# Patient Record
Sex: MALE | Race: WHITE | NOT HISPANIC OR LATINO | Employment: OTHER | URBAN - METROPOLITAN AREA
[De-identification: names, ages, dates, MRNs, and addresses within clinical notes are randomized per-mention and may not be internally consistent; named-entity substitution may affect disease eponyms.]

---

## 2018-01-04 ENCOUNTER — HOSPITAL ENCOUNTER (INPATIENT)
Facility: HOSPITAL | Age: 63
LOS: 2 days | Discharge: HOME | DRG: 064 | End: 2018-01-06
Attending: EMERGENCY MEDICINE | Admitting: PSYCHIATRY & NEUROLOGY

## 2018-01-04 DIAGNOSIS — R47.1 DYSARTHRIA: ICD-10-CM

## 2018-01-04 DIAGNOSIS — E11.65 POORLY CONTROLLED TYPE 2 DIABETES MELLITUS: ICD-10-CM

## 2018-01-04 DIAGNOSIS — I63.412 EMBOLIC STROKE INVOLVING LEFT MIDDLE CEREBRAL ARTERY: Primary | ICD-10-CM

## 2018-01-04 DIAGNOSIS — Z92.82 TISSUE PLASMINOGEN ACTIVATOR (T-PA) ADMINISTERED AT OTHER FACILITY WITHIN 24 HOURS PRIOR TO CURRENT ADMISSION: ICD-10-CM

## 2018-01-04 DIAGNOSIS — I63.512 ACUTE ISCHEMIC LEFT MCA STROKE: ICD-10-CM

## 2018-01-04 DIAGNOSIS — R47.01 APHASIA: ICD-10-CM

## 2018-01-04 PROBLEM — I63.331: Status: ACTIVE | Noted: 2018-01-04

## 2018-01-04 PROBLEM — I63.311 STROKE DUE TO THROMBOSIS OF RIGHT MIDDLE CEREBRAL ARTERY: Status: ACTIVE | Noted: 2018-01-04

## 2018-01-04 LAB
ABO + RH BLD: NORMAL
BLD GP AB SCN CELLS X3 SERPL QL: NORMAL
CHOLEST SERPL-MCNC: 373 MG/DL
CHOLEST/HDLC SERPL: 10.4 {RATIO}
ESTIMATED AVG GLUCOSE: 203 MG/DL
GLUCOSE SERPL-MCNC: 208 MG/DL (ref 70–110)
HBA1C MFR BLD HPLC: 8.7 %
HDLC SERPL-MCNC: 36 MG/DL
HDLC SERPL: 9.7 %
LDLC SERPL CALC-MCNC: ABNORMAL MG/DL
NONHDLC SERPL-MCNC: 337 MG/DL
POCT GLUCOSE: 208 MG/DL (ref 70–110)
POCT GLUCOSE: 239 MG/DL (ref 70–110)
TRIGL SERPL-MCNC: 1305 MG/DL
TSH SERPL DL<=0.005 MIU/L-ACNC: 2.13 UIU/ML

## 2018-01-04 PROCEDURE — 99285 EMERGENCY DEPT VISIT HI MDM: CPT | Mod: ,,, | Performed by: EMERGENCY MEDICINE

## 2018-01-04 PROCEDURE — 93005 ELECTROCARDIOGRAM TRACING: CPT

## 2018-01-04 PROCEDURE — 82962 GLUCOSE BLOOD TEST: CPT

## 2018-01-04 PROCEDURE — A4216 STERILE WATER/SALINE, 10 ML: HCPCS | Performed by: PHYSICIAN ASSISTANT

## 2018-01-04 PROCEDURE — 93010 ELECTROCARDIOGRAM REPORT: CPT | Mod: ,,, | Performed by: INTERNAL MEDICINE

## 2018-01-04 PROCEDURE — 99285 EMERGENCY DEPT VISIT HI MDM: CPT | Mod: 25

## 2018-01-04 PROCEDURE — 96361 HYDRATE IV INFUSION ADD-ON: CPT | Mod: XS

## 2018-01-04 PROCEDURE — 96374 THER/PROPH/DIAG INJ IV PUSH: CPT

## 2018-01-04 PROCEDURE — 99223 1ST HOSP IP/OBS HIGH 75: CPT | Mod: ,,, | Performed by: PHYSICIAN ASSISTANT

## 2018-01-04 PROCEDURE — 12000002 HC ACUTE/MED SURGE SEMI-PRIVATE ROOM

## 2018-01-04 PROCEDURE — 25000003 PHARM REV CODE 250: Performed by: PHYSICIAN ASSISTANT

## 2018-01-04 PROCEDURE — 83036 HEMOGLOBIN GLYCOSYLATED A1C: CPT

## 2018-01-04 PROCEDURE — 84443 ASSAY THYROID STIM HORMONE: CPT

## 2018-01-04 PROCEDURE — 96375 TX/PRO/DX INJ NEW DRUG ADDON: CPT

## 2018-01-04 PROCEDURE — 80061 LIPID PANEL: CPT

## 2018-01-04 PROCEDURE — 86850 RBC ANTIBODY SCREEN: CPT

## 2018-01-04 PROCEDURE — 25500020 PHARM REV CODE 255: Performed by: EMERGENCY MEDICINE

## 2018-01-04 PROCEDURE — 96360 HYDRATION IV INFUSION INIT: CPT | Mod: XS

## 2018-01-04 PROCEDURE — 25000003 PHARM REV CODE 250: Performed by: EMERGENCY MEDICINE

## 2018-01-04 RX ORDER — ATORVASTATIN CALCIUM 20 MG/1
40 TABLET, FILM COATED ORAL DAILY
Status: DISCONTINUED | OUTPATIENT
Start: 2018-01-05 | End: 2018-01-06

## 2018-01-04 RX ORDER — IBUPROFEN 200 MG
24 TABLET ORAL
Status: DISCONTINUED | OUTPATIENT
Start: 2018-01-05 | End: 2018-01-06 | Stop reason: HOSPADM

## 2018-01-04 RX ORDER — GLUCAGON 1 MG
1 KIT INJECTION
Status: DISCONTINUED | OUTPATIENT
Start: 2018-01-05 | End: 2018-01-06 | Stop reason: HOSPADM

## 2018-01-04 RX ORDER — AMOXICILLIN 250 MG
1 CAPSULE ORAL 2 TIMES DAILY
Status: DISCONTINUED | OUTPATIENT
Start: 2018-01-04 | End: 2018-01-06 | Stop reason: HOSPADM

## 2018-01-04 RX ORDER — LABETALOL HYDROCHLORIDE 5 MG/ML
10 INJECTION, SOLUTION INTRAVENOUS ONCE
Status: COMPLETED | OUTPATIENT
Start: 2018-01-04 | End: 2018-01-04

## 2018-01-04 RX ORDER — ACETAMINOPHEN 325 MG/1
650 TABLET ORAL EVERY 6 HOURS PRN
Status: DISCONTINUED | OUTPATIENT
Start: 2018-01-04 | End: 2018-01-06 | Stop reason: HOSPADM

## 2018-01-04 RX ORDER — INSULIN ASPART 100 [IU]/ML
1-10 INJECTION, SOLUTION INTRAVENOUS; SUBCUTANEOUS
Status: DISCONTINUED | OUTPATIENT
Start: 2018-01-05 | End: 2018-01-06

## 2018-01-04 RX ORDER — ONDANSETRON 2 MG/ML
8 INJECTION INTRAMUSCULAR; INTRAVENOUS EVERY 8 HOURS PRN
Status: DISCONTINUED | OUTPATIENT
Start: 2018-01-04 | End: 2018-01-06 | Stop reason: HOSPADM

## 2018-01-04 RX ORDER — SODIUM CHLORIDE 9 MG/ML
INJECTION, SOLUTION INTRAVENOUS CONTINUOUS
Status: DISCONTINUED | OUTPATIENT
Start: 2018-01-04 | End: 2018-01-05

## 2018-01-04 RX ORDER — IBUPROFEN 200 MG
16 TABLET ORAL
Status: DISCONTINUED | OUTPATIENT
Start: 2018-01-05 | End: 2018-01-06 | Stop reason: HOSPADM

## 2018-01-04 RX ORDER — SODIUM CHLORIDE 0.9 % (FLUSH) 0.9 %
3 SYRINGE (ML) INJECTION EVERY 8 HOURS
Status: DISCONTINUED | OUTPATIENT
Start: 2018-01-04 | End: 2018-01-06 | Stop reason: HOSPADM

## 2018-01-04 RX ADMIN — SODIUM CHLORIDE: 0.9 INJECTION, SOLUTION INTRAVENOUS at 10:01

## 2018-01-04 RX ADMIN — IOHEXOL 100 ML: 350 INJECTION, SOLUTION INTRAVENOUS at 07:01

## 2018-01-04 RX ADMIN — STANDARDIZED SENNA CONCENTRATE AND DOCUSATE SODIUM 1 TABLET: 8.6; 5 TABLET, FILM COATED ORAL at 10:01

## 2018-01-04 RX ADMIN — SODIUM CHLORIDE, PRESERVATIVE FREE 3 ML: 5 INJECTION INTRAVENOUS at 10:01

## 2018-01-04 RX ADMIN — LABETALOL HYDROCHLORIDE 10 MG: 5 INJECTION, SOLUTION INTRAVENOUS at 07:01

## 2018-01-05 PROBLEM — G93.6 CYTOTOXIC CEREBRAL EDEMA: Status: ACTIVE | Noted: 2018-01-05

## 2018-01-05 PROBLEM — I63.412 EMBOLIC STROKE INVOLVING LEFT MIDDLE CEREBRAL ARTERY: Status: ACTIVE | Noted: 2018-01-04

## 2018-01-05 PROBLEM — I65.22 CAROTID STENOSIS, LEFT: Status: ACTIVE | Noted: 2018-01-05

## 2018-01-05 PROBLEM — E78.2 MIXED HYPERLIPIDEMIA: Status: ACTIVE | Noted: 2018-01-05

## 2018-01-05 LAB
ALBUMIN SERPL BCP-MCNC: 3.2 G/DL
ALP SERPL-CCNC: 92 U/L
ALT SERPL W/O P-5'-P-CCNC: 25 U/L
ANION GAP SERPL CALC-SCNC: 13 MMOL/L
AST SERPL-CCNC: 18 U/L
BASOPHILS # BLD AUTO: 0.02 K/UL
BASOPHILS NFR BLD: 0.3 %
BILIRUB SERPL-MCNC: 0.3 MG/DL
BUN SERPL-MCNC: 9 MG/DL
CALCIUM SERPL-MCNC: 9.6 MG/DL
CHLORIDE SERPL-SCNC: 102 MMOL/L
CO2 SERPL-SCNC: 21 MMOL/L
CREAT SERPL-MCNC: 1.1 MG/DL
DIASTOLIC DYSFUNCTION: NO
DIFFERENTIAL METHOD: ABNORMAL
EOSINOPHIL # BLD AUTO: 0.1 K/UL
EOSINOPHIL NFR BLD: 1 %
ERYTHROCYTE [DISTWIDTH] IN BLOOD BY AUTOMATED COUNT: 12.3 %
EST. GFR  (AFRICAN AMERICAN): >60 ML/MIN/1.73 M^2
EST. GFR  (NON AFRICAN AMERICAN): >60 ML/MIN/1.73 M^2
GLUCOSE SERPL-MCNC: 335 MG/DL
HCT VFR BLD AUTO: 38.2 %
HGB BLD-MCNC: 13.5 G/DL
IMM GRANULOCYTES # BLD AUTO: 0.02 K/UL
IMM GRANULOCYTES NFR BLD AUTO: 0.3 %
LYMPHOCYTES # BLD AUTO: 2.2 K/UL
LYMPHOCYTES NFR BLD: 32.8 %
MAGNESIUM SERPL-MCNC: 2.3 MG/DL
MCH RBC QN AUTO: 32.9 PG
MCHC RBC AUTO-ENTMCNC: 35.3 G/DL
MCV RBC AUTO: 93 FL
MONOCYTES # BLD AUTO: 0.4 K/UL
MONOCYTES NFR BLD: 5.3 %
NEUTROPHILS # BLD AUTO: 4.1 K/UL
NEUTROPHILS NFR BLD: 60.3 %
NRBC BLD-RTO: 0 /100 WBC
PHOSPHATE SERPL-MCNC: 3.3 MG/DL
PLATELET # BLD AUTO: 190 K/UL
PMV BLD AUTO: 10 FL
POCT GLUCOSE: 140 MG/DL (ref 70–110)
POCT GLUCOSE: 199 MG/DL (ref 70–110)
POTASSIUM SERPL-SCNC: 4.6 MMOL/L
PROT SERPL-MCNC: 6.7 G/DL
RBC # BLD AUTO: 4.1 M/UL
RETIRED EF AND QEF - SEE NOTES: 55 (ref 55–65)
SODIUM SERPL-SCNC: 136 MMOL/L
WBC # BLD AUTO: 6.77 K/UL

## 2018-01-05 PROCEDURE — 93306 TTE W/DOPPLER COMPLETE: CPT

## 2018-01-05 PROCEDURE — G8998 SWALLOW D/C STATUS: HCPCS | Mod: CI

## 2018-01-05 PROCEDURE — 84100 ASSAY OF PHOSPHORUS: CPT

## 2018-01-05 PROCEDURE — 63600175 PHARM REV CODE 636 W HCPCS: Performed by: PHYSICIAN ASSISTANT

## 2018-01-05 PROCEDURE — 25000003 PHARM REV CODE 250: Performed by: PHYSICIAN ASSISTANT

## 2018-01-05 PROCEDURE — 25000003 PHARM REV CODE 250: Performed by: STUDENT IN AN ORGANIZED HEALTH CARE EDUCATION/TRAINING PROGRAM

## 2018-01-05 PROCEDURE — 85025 COMPLETE CBC W/AUTO DIFF WBC: CPT

## 2018-01-05 PROCEDURE — G8996 SWALLOW CURRENT STATUS: HCPCS | Mod: CI

## 2018-01-05 PROCEDURE — 83735 ASSAY OF MAGNESIUM: CPT

## 2018-01-05 PROCEDURE — A4216 STERILE WATER/SALINE, 10 ML: HCPCS | Performed by: PHYSICIAN ASSISTANT

## 2018-01-05 PROCEDURE — 80053 COMPREHEN METABOLIC PANEL: CPT

## 2018-01-05 PROCEDURE — 92610 EVALUATE SWALLOWING FUNCTION: CPT

## 2018-01-05 PROCEDURE — 93306 TTE W/DOPPLER COMPLETE: CPT | Mod: 26,,, | Performed by: INTERNAL MEDICINE

## 2018-01-05 PROCEDURE — 63600175 PHARM REV CODE 636 W HCPCS: Performed by: STUDENT IN AN ORGANIZED HEALTH CARE EDUCATION/TRAINING PROGRAM

## 2018-01-05 PROCEDURE — 20600001 HC STEP DOWN PRIVATE ROOM

## 2018-01-05 PROCEDURE — 12000002 HC ACUTE/MED SURGE SEMI-PRIVATE ROOM

## 2018-01-05 PROCEDURE — 99233 SBSQ HOSP IP/OBS HIGH 50: CPT | Mod: ,,, | Performed by: PHYSICIAN ASSISTANT

## 2018-01-05 PROCEDURE — G8997 SWALLOW GOAL STATUS: HCPCS | Mod: CI

## 2018-01-05 PROCEDURE — 96372 THER/PROPH/DIAG INJ SC/IM: CPT | Mod: XU

## 2018-01-05 PROCEDURE — 92523 SPEECH SOUND LANG COMPREHEN: CPT

## 2018-01-05 PROCEDURE — 99233 SBSQ HOSP IP/OBS HIGH 50: CPT | Mod: ,,, | Performed by: PSYCHIATRY & NEUROLOGY

## 2018-01-05 PROCEDURE — 82962 GLUCOSE BLOOD TEST: CPT

## 2018-01-05 PROCEDURE — 97162 PT EVAL MOD COMPLEX 30 MIN: CPT

## 2018-01-05 RX ORDER — ASPIRIN 325 MG
325 TABLET ORAL DAILY
Status: DISCONTINUED | OUTPATIENT
Start: 2018-01-05 | End: 2018-01-05

## 2018-01-05 RX ORDER — HEPARIN SODIUM 5000 [USP'U]/ML
5000 INJECTION, SOLUTION INTRAVENOUS; SUBCUTANEOUS EVERY 8 HOURS
Status: DISCONTINUED | OUTPATIENT
Start: 2018-01-05 | End: 2018-01-05

## 2018-01-05 RX ORDER — ASPIRIN 325 MG
325 TABLET ORAL DAILY
Status: DISCONTINUED | OUTPATIENT
Start: 2018-01-05 | End: 2018-01-06 | Stop reason: HOSPADM

## 2018-01-05 RX ORDER — HYDRALAZINE HYDROCHLORIDE 20 MG/ML
10 INJECTION INTRAMUSCULAR; INTRAVENOUS
Status: COMPLETED | OUTPATIENT
Start: 2018-01-05 | End: 2018-01-05

## 2018-01-05 RX ORDER — LISINOPRIL 10 MG/1
10 TABLET ORAL DAILY
Status: DISCONTINUED | OUTPATIENT
Start: 2018-01-05 | End: 2018-01-06 | Stop reason: HOSPADM

## 2018-01-05 RX ORDER — HEPARIN SODIUM 5000 [USP'U]/ML
5000 INJECTION, SOLUTION INTRAVENOUS; SUBCUTANEOUS EVERY 8 HOURS
Status: DISCONTINUED | OUTPATIENT
Start: 2018-01-05 | End: 2018-01-06 | Stop reason: HOSPADM

## 2018-01-05 RX ORDER — INSULIN ASPART 100 [IU]/ML
4 INJECTION, SOLUTION INTRAVENOUS; SUBCUTANEOUS
Status: DISCONTINUED | OUTPATIENT
Start: 2018-01-05 | End: 2018-01-06 | Stop reason: HOSPADM

## 2018-01-05 RX ADMIN — INSULIN DETEMIR 6 UNITS: 100 INJECTION, SOLUTION SUBCUTANEOUS at 09:01

## 2018-01-05 RX ADMIN — HEPARIN SODIUM 5000 UNITS: 5000 INJECTION, SOLUTION INTRAVENOUS; SUBCUTANEOUS at 09:01

## 2018-01-05 RX ADMIN — SODIUM CHLORIDE, PRESERVATIVE FREE 3 ML: 5 INJECTION INTRAVENOUS at 09:01

## 2018-01-05 RX ADMIN — SODIUM CHLORIDE, PRESERVATIVE FREE 3 ML: 5 INJECTION INTRAVENOUS at 01:01

## 2018-01-05 RX ADMIN — INSULIN ASPART 2 UNITS: 100 INJECTION, SOLUTION INTRAVENOUS; SUBCUTANEOUS at 01:01

## 2018-01-05 RX ADMIN — HYDRALAZINE HYDROCHLORIDE 10 MG: 20 INJECTION INTRAMUSCULAR; INTRAVENOUS at 08:01

## 2018-01-05 RX ADMIN — STANDARDIZED SENNA CONCENTRATE AND DOCUSATE SODIUM 1 TABLET: 8.6; 5 TABLET, FILM COATED ORAL at 09:01

## 2018-01-05 RX ADMIN — ASPIRIN 325 MG ORAL TABLET 325 MG: 325 PILL ORAL at 09:01

## 2018-01-05 RX ADMIN — INSULIN ASPART 4 UNITS: 100 INJECTION, SOLUTION INTRAVENOUS; SUBCUTANEOUS at 01:01

## 2018-01-05 RX ADMIN — ATORVASTATIN CALCIUM 40 MG: 20 TABLET, FILM COATED ORAL at 08:01

## 2018-01-05 RX ADMIN — STANDARDIZED SENNA CONCENTRATE AND DOCUSATE SODIUM 1 TABLET: 8.6; 5 TABLET, FILM COATED ORAL at 08:01

## 2018-01-05 RX ADMIN — INSULIN ASPART 4 UNITS: 100 INJECTION, SOLUTION INTRAVENOUS; SUBCUTANEOUS at 04:01

## 2018-01-05 RX ADMIN — INSULIN DETEMIR 6 UNITS: 100 INJECTION, SOLUTION SUBCUTANEOUS at 01:01

## 2018-01-05 RX ADMIN — SODIUM CHLORIDE, PRESERVATIVE FREE 3 ML: 5 INJECTION INTRAVENOUS at 06:01

## 2018-01-05 RX ADMIN — LISINOPRIL 10 MG: 10 TABLET ORAL at 01:01

## 2018-01-05 NOTE — PT/OT/SLP EVAL
"Physical Therapy Evaluation   Discharge Note    Patient Name:  Duane Raines   MRN:  [id number]    Recommendations:     Discharge Recommendations:  home   Discharge Equipment Recommendations: none   Barriers to discharge: None    Plan:     During this hospitalization, patient does not require further acute PT services.  Please re-consult if situation changes.     Plan of Care Reviewed with: patient      History:     History reviewed. No pertinent past medical history.  Pt fell through ceiling several years ago.  No residual deficits from fall.     History reviewed. No pertinent surgical history.      Subjective     Communicated with RN prior to session.  Patient found supine in bed on stretcher in the ED upon PT entry to room, agreeable to evaluation.      Chief Complaint: speech  Patient comments/goals: "I was talking to my son, when it became difficult for me to get my words out.  He didn't recognize what was wrong with me, but I knew I had to get to the hospital."  Pain/Comfort:  · Pain Rating 1: 0/10  · Pain Rating Post-Intervention 1: 0/10    Patients cultural, spiritual, Latter day conflicts given the current situation:      Living Environment:  Pt lives in [place], [place] in a trailer with 5 steps and bilateral rails to enter (R rail in the front, bilateral rails in the back).  He was independent prior to admit and his son recently moved in with him (from prison).  Pt is retired and not working, but still drives and remains active.  Pt is R handed and has walk in shower, tub/shower combo, and garden tub.  His son is available to assist as needed.     Objective:     Patient found with: telemetry, pulse ox (continuous), peripheral IV, blood pressure cuff     General Precautions: Standard, aspiration, fall     PHYSICAL EXAMINATION  Cognitive Function:  - Oriented to: person, place, time and situation  - Level of Alertness: awake and alert  - Follows Commands/attention: Follows multistep  commands  - Communication: " dysarthria  - Safety awareness/insight to disability: intact  Musculoskeletal System  Upper Extremity   ROM: WFL  Strength: WFL  Lower Extremities:  ROM: WFL  Strength:  Muscle Group R LE L LE Comments   Hip flexion  5/5 5/5       Knee flexion  5/5 5/5       Knee ext. 5/5 5/5    Ankle DF 5/5 5/5    Ankle PF 5/5 5/5    - Muscular Tone: normal    Integumentary System: Visible skin intact  Neuromuscular System:  - Sensation: peripheral neuropathy in bilateral feet in sock distribution, dysesthesias   - Coordination:    Finger to thumb opposition: intact, except on L index finger d/t ROM deficits   Finger to nose: intact   Posture and gross symmetry: no postural deviations noted in sitting or standing    BALANCE:  Sitting:  - Static: NORMAL: No deviations seen in posture held statically;   - Dynamic: NORMAL: No deviations seen in posture held dynamically;   Standing:  - Static Stand: NORMAL: No deviations seen in posture held statically;   - Dynamic stand: GOOD+: Independent gait (with or without assistive device);     FUNCTIONAL MOBILITY ASSESSMENT:  Bed Mobility: performed with HOB flat  - Rolling/Turning R: independent  - Rolling/Turning L: independent   - Supine <> sit: independent   - Scooting EOB: independent      Transfers:  - Sit <> stand transfer: independent    - Bed <> chair transfer: stand by assist/supervision for line management only     Gait: 20 feet with supervision for line management only  - Patient demonstrated reciprocal gait pattern with no significant gait deviations, LOB, or safety concerns.   - Gt limited d/t within 24hrs of receiving tPA    Tandem gait performed x 5 feet with minimal instability but no overt LOB  Standing in maximal position with no LOB even with perturbations.     THERAPEUTIC ACTIVITIES AND EXERCISES:  Therapist educated patient on the role of PT, POC, and therapy recommendations of no additional PT.  Therapist discussed the patients current mobility status and level of  assistance with patient.  Therapist answered questions to patient/familys satisfaction within scope of practice.  White board updated to reflect current level of assistance.     Pt is safe to perform transfers and gait with supervision for line management.       Patient left supine on stretcher with all lines intact, call button in reach and RN notified.       AM-PAC 6 CLICK MOBILITY  Total Score:24     GOALS:    Physical Therapy Goals     Not on file          Multidisciplinary Problems (Resolved)        Problem: Physical Therapy Goal    Goal Priority Disciplines Outcome Goal Variances Interventions   Physical Therapy Goal   (Resolved)     PT/OT, PT Outcome(s) achieved                   Assessment:     Duane Raines is a 62 y.o. male admitted with a medical diagnosis of L MCA stroke. He was completely independent prior to admit.  At this time, patient is functioning at their prior level of function and does not require further acute PT services. He is safe to d/c home when medically appropriate with no additional PT or DME needs.     Recent Surgery: * No surgery found *      Clinical Decision Making:   COMPLEXITY OF PT EXAMINATION:  HISTORY     Comorbidities that affect the PT plan of care or the patient's ability to participate in/progress with therapy:  1. Peripheral neuropathy        Personal Factors:   1. Time since onset of injury / illness / exacerbation.    EXAMINATION  Body Systems:  1. Communication ability, affect, cognition, language, and learning style: the assessment of the ability to make needs known, consciousness, orientation, expected emotional /behavioral responses, and learning preferences  2. Neuromuscular system: a general assessment of gross coordinated movement (eg, balance, gait, locomotion, transfers, and transitions) and motor function (motor control and motor learning)  3. Musculoskeletal system: the assessment of gross symmetry, gross range of motion, gross strength, height, and  weight  4. Integumentary system: the assessment of pliability(texture), presence of scar formation, skin color, and skin integrity      Activity or participation limitations:   Status of current condition    CLINICAL PRESENTATION: Evolving/changing characteristics  Pt was evaluated in the ED within 24 hrs of receiving tPA and vitals monitored continuously throughout.      LEVEL OF COMPLEXITY: Moderate Complexity - at least 1-2 personal factors or comorbidities that impact the plan of care; examination addressing at least 3 body structures and functions, activity limitations, and/or participation restrictions; and clinical presentation with evolving or changing characteristics.    Time Tracking:     PT Received On: 01/05/18  PT Start Time: 1026     PT Stop Time: 1050  PT Total Time (min): 24 min     Billable Minutes: Evaluation 24      Veronica Perez, PT  01/05/2018

## 2018-01-05 NOTE — SUBJECTIVE & OBJECTIVE
History reviewed. No pertinent past medical history.  History reviewed. No pertinent surgical history.  Family History   Problem Relation Age of Onset    Diabetes Mother      Social History   Substance Use Topics    Smoking status: Former Smoker    Smokeless tobacco: Never Used    Alcohol use No     Review of patient's allergies indicates:  No Known Allergies    Medications: I have reviewed the current medication administration record.      (Not in a hospital admission)    Review of Systems   Constitutional: Negative for chills and fever.   HENT: Negative for drooling and rhinorrhea.    Eyes: Positive for visual disturbance. Negative for redness.   Respiratory: Negative for cough and shortness of breath.    Cardiovascular: Negative for chest pain and palpitations.   Gastrointestinal: Negative for diarrhea and vomiting.   Genitourinary: Negative for frequency and urgency.   Musculoskeletal: Negative for arthralgias and gait problem.   Skin: Negative for pallor and rash.   Neurological: Negative for facial asymmetry, weakness and headaches.   Psychiatric/Behavioral: Negative for agitation and behavioral problems.     Objective:     Vital Signs (Most Recent):  Temp: 97.9 °F (36.6 °C) (01/04/18 1908)  Pulse: 72 (01/04/18 2016)  Resp: 18 (01/04/18 2016)  BP: (!) 158/99 (01/04/18 2016)  SpO2: 98 % (01/04/18 2016)    Vital Signs Range (Last 24H):  Temp:  [97.4 °F (36.3 °C)-97.9 °F (36.6 °C)]   Pulse:  [67-84]   Resp:  [9-22]   BP: (144-195)/()   SpO2:  [97 %-99 %]     Physical Exam   Constitutional: He appears well-developed and well-nourished.   HENT:   Head: Normocephalic and atraumatic.   Eyes: EOM are normal. Pupils are equal, round, and reactive to light.   Neck: Normal range of motion. Neck supple.   Cardiovascular: Normal rate and regular rhythm.    Pulmonary/Chest: Effort normal. No respiratory distress.   Abdominal: Soft. He exhibits no distension.   Musculoskeletal: Normal range of motion. He  exhibits no deformity.   Neurological:   See below     Skin: Skin is warm and dry.       Neurological Exam:   LOC: alert  Attention Span: Good   Language: Expressive aphasia  Articulation: Dysarthria  Orientation: Person, Place, Time   Visual Fields: Hemianopsia left upper quadrant   EOM (CN III, IV, VI): unable to look up   Pupils (CN II, III):PERRL   Facial Sensation (CN V): equal   Facial Movement (CN VII): Symmetric facial expression    Gag Reflex: not examined  Reflexes: not examined  Motor: 5/5 in all extremities   Cebellar: normal   Sensation: Intact to light touch, temperature and vibration  Tone: Normal tone throughout      Laboratory:  CMP:   Recent Labs  Lab 01/04/18  1711   CALCIUM 10.0   ALBUMIN 4.3   PROT 8.1      K 4.5   CO2 26      BUN 11   CREATININE 0.77   ALKPHOS 113   ALT 48*   AST 25   BILITOT 0.5     CBC:   Recent Labs  Lab 01/04/18  1711   WBC 8.77   RBC 4.62   HGB 15.7   HCT 43.1      MCV 93   MCH 34.0*   MCHC 36.4*     Lipid Panel: No results for input(s): CHOL, LDLCALC, HDL, TRIG in the last 168 hours.  Hgb A1C: No results for input(s): HGBA1C in the last 168 hours.  TSH: No results for input(s): TSH in the last 168 hours.    Diagnostic Results:      Brain imaging:      Vessel Imaging:  CTA  01/04/18  -no large vessel occlusion  -minor L ICA atherosclerosis  -dominant L vert    Cardiac Evaluation:

## 2018-01-05 NOTE — PT/OT/SLP EVAL
"Speech Language Pathology Evaluation  Cognitive/Bedside Swallow    Patient Name:  Duane Raines   MRN:  [id number]  Admitting Diagnosis: aphasia/dysarthria s/p tpa    Recommendations:                  General Recommendations:  Dysphagia therapy and Speech/language therapy  Diet recommendations:  Regular, Thin   Aspiration Precautions: 1 bite/sip at a time, Alternating bites/sips, Avoid talking while eating, Check for pocketing/oral residue, Meds whole 1 at a time and Small bites/sips   General Precautions: Standard, aspiration, fall  Communication strategies:  none    History:     History reviewed. No pertinent past medical history.    History reviewed. No pertinent surgical history.    Social History: Patient lives alone though son is currently residing with him after recently being released from residential.  Pt was indpt pta, retired  from [employer].  Pt reports h/o dyslexia, poor reader pta.    Prior diet: reg/thin.    Subjective     "My speech is bad."  Patient goals: to improve speech     Pain/Comfort:  Pain Rating 1: 0/10  Pain Rating Post-Intervention 1: 0/10    Objective:     Cognitive Status:    Arousal/Alertness Appropriate response to stimuli  Attention No obvious deficits observed    Orientation Oriented x4  Memory Immediate Recall 100% up to 5 digits/words and Delayed 2/3 unassociated words recalled after delay  Problem Solving Categories 100% , Sequencing 100%, Solutions 100% and Compare/contrast 100%  -simple, concrete tasks.  8 items stated in concrete cat in 1 min though effort with tasks appeared decreased, pt attributed difficulty/decreased desire to complete task to dyslexia.      Receptive Language:   Comprehension:      WFL  Questions Complex yes/no 100%, repeat x1  Commands  multistep basic commands 100%    Pragmatics:    WFL    Expressive Language:  Verbal:    Naming Confrontation 100%  Conversational speech increased response time with hesitations, interjections and some occasional semantic " paraphasias evident; mild aphasia      Motor Speech:  Dysarthria mild-mod c/b slow rate and decreased articulatory precision    Voice:   WFL    Visual-Spatial:  WFL for clock drawing task    Reading:   WFL for large print sentence, pt reports poor reading ability at baseline    Written Expression:   WFL    Oral Musculature Evaluation  Oral Musculature: right weakness (mild)  Dentition: scattered dentition, teeth in poor condition  Secretion Management: adequate  Mucosal Quality: adequate  Oral Labial Strength and Mobility: WFL  Lingual Strength and Mobility: impaired strength  Volitional Cough: fair given cues  Volitional Swallow: adequate  Voice Prior to PO Intake: clear, dysarthria    Bedside Swallow Eval:   Consistencies Assessed:  · Thin liquids    · Puree    · Solids       Oral Phase:   · Excess chewing  · Lingual residue -clears with second swallow/liquid wash    Pharyngeal Phase:   · no overt clinical signs/symptoms of aspiration    Compensatory Strategies  · Lingual sweep    Treatment: Pt educated on role of SLP, swallow precs and POC.  He verbalized fair-good understanding.  Nursing alerted re: results and recs.     Assessment:     Duane Raines is a 62 y.o. male with an SLP diagnosis of mild Aphasia, Dysphagia and Dysarthria.      Goals:    SLP Goals        Problem: SLP Goal    Goal Priority Disciplines Outcome   SLP Goal     SLP Ongoing (interventions implemented as appropriate)   Description:  Speech Language Pathology Goals  Goals expected to be met by 1/12  1. Pt will tolerate regular diet with thin liquids with adequate oral clearance and no overt s/s aspiration.  2. Pt will demonstrate independence with OME's for home program.   3. Pt will recall 3/3 simple speech strategies Indptly.  4. Pt will complete mod complex word finding tasks with 80% accy with min cues.                          Plan:     · Patient to be seen:  4 x/week   · Plan of Care expires:  02/04/18  · Plan of Care reviewed with:   patient   · SLP Follow-Up:  Yes       Discharge recommendations:  Discharge Facility/Level Of Care Needs: home health speech therapy, outpatient speech therapy (pending transportation and OT recs, either is appropriate)   Barriers to Discharge:  None    Time Tracking:     SLP Treatment Date:   01/05/18  Speech Start Time:  0813  Speech Stop Time:  0834     Speech Total Time (min):  21 min    Billable Minutes: Eval 11  and Eval Swallow and Oral Function 10    KARMA Plaza, CCC-SLP  01/05/2018

## 2018-01-05 NOTE — ASSESSMENT & PLAN NOTE
62 year old male with medical history of prior tobacco abuse was transferred from [facility] after receiving IV-tPA for expressive aphasia, dysarthria and LUQ hemianopsia.  No large vessel occlusion seen for tPA     Antithrombotics for secondary stroke prevention: Antiplatelets: None: Hold all Antithrombotics x 24 hours after IV t-PA administration    Statins for secondary stroke prevention and hyperlipidemia, if present: Chol 373, Atorvastatin 40 mg    Aggressive risk factor modification: none     Rehab efforts: PT/OT/SLP to evaluate and treat    Diagnostics ordered/pending: MRI brain  VTE prophylaxis: None: Reason for No Pharmacological VTE Prophylaxis: Mechanical prophylaxis: Place SCDs    BP parameters: Infarct: Post tPA, SBP <180

## 2018-01-05 NOTE — SUBJECTIVE & OBJECTIVE
Neurologic Chief Complaint: stroke    Subjective:     Interval History: Patient is seen for follow-up neurological assessment and treatment recommendations:     Pt reports that dysarthria is resolving. No new complaints.     HPI, Past Medical, Family, and Social History remains the same as documented in the initial encounter.     Review of Systems  Scheduled Meds:   atorvastatin  40 mg Oral Daily    senna-docusate 8.6-50 mg  1 tablet Oral BID    sodium chloride 0.9%  3 mL Intravenous Q8H     Continuous Infusions:   sodium chloride 0.9% 75 mL/hr at 01/05/18 0905     PRN Meds:acetaminophen, dextrose 50%, dextrose 50%, glucagon (human recombinant), glucose, glucose, insulin aspart, ondansetron    Objective:     Vital Signs (Most Recent):  Temp: 98.2 °F (36.8 °C) (01/05/18 0905)  Pulse: 67 (01/05/18 0916)  Resp: 13 (01/05/18 0916)  BP: (!) 178/82 (01/05/18 0916)  SpO2: 98 % (01/05/18 0916)  BP Location: Left arm    Vital Signs Range (Last 24H):  Temp:  [97.4 °F (36.3 °C)-98.2 °F (36.8 °C)]   Pulse:  [60-84]   Resp:  [9-22]   BP: (136-195)/()   SpO2:  [95 %-99 %]   BP Location: Left arm    Physical Exam    Neurological Exam:   Mental Status  Orientation: alert and oriented to person, place, time and situation, memory intact  Language: good fluency, no aphasia, mild dysarthria    Cranial Nerves  Visual acuity grossly intact, PERRLA, EOMI, V1-V3 subjectively intact, smile symmetric, pt closes eyes symmetrically, hearing grossly intact, uvula midline, gag reflex intact, SCM and trapezius 5/5  Bilaterally, tongue protrudes midline    Motor  Normal tone and bulk. No fasciculations.  LUE 5/5  LLE 5/5  RUE 5/5  RLE 5/5  DTRs 2+  Throughout    Sensory  Light touch, vibration, pinprick intact throughout    Cerebellar  Finger to nose intact bilaterally  Heel to shin intact bilaterally    Gait  deferred.      Laboratory:  CMP:   Recent Labs  Lab 01/05/18  0248   CALCIUM 9.6   ALBUMIN 3.2*   PROT 6.7      K 4.6   CO2  21*      BUN 9   CREATININE 1.1   ALKPHOS 92   ALT 25   AST 18   BILITOT 0.3     BMP:   Recent Labs  Lab 01/05/18  0248      K 4.6      CO2 21*   BUN 9   CREATININE 1.1   CALCIUM 9.6     CBC:   Recent Labs  Lab 01/05/18  0248   WBC 6.77   RBC 4.10*   HGB 13.5*   HCT 38.2*      MCV 93   MCH 32.9*   MCHC 35.3     Lipid Panel:   Recent Labs  Lab 01/04/18  2111   CHOL 373*   LDLCALC Invalid, Trig>400.0   HDL 36*   TRIG 1,305*     Coagulation:   Recent Labs  Lab 01/04/18  1722   INR 0.9   APTT 27.6     Platelet Aggregation Study: No results for input(s): PLTAGG, PLTAGINTERP, PLTAGREGLACO, ADPPLTAGGREG in the last 168 hours.  Hgb A1C:   Recent Labs  Lab 01/04/18 2111   HGBA1C 8.7*     TSH:   Recent Labs  Lab 01/04/18 2111   TSH 2.131       Diagnostic Results     Brain Imaging   CT head:  FINDINGS:    Mild age-appropriate involutional changes are noted.  There is intracranial atherosclerosis.  Minimal periventricular white matter chronic micro-ischemic changes are noted.  No evidence of acute intracranial hemorrhage, mass effect, midline deviation, hydrocephalus, or abnormal extra-axial fluid collection is seen. No evidence of acute large vessel territory ischemia is seen. The basilar cisterns are preserved. The visualized paranasal sinuses are clear. The mastoid air cells are grossly clear. No acute displaced calvarial abnormality is appreciated.   Impression       1. No acute intracranial process is identified.    Findings were discussed with Dr. Terrell in the emergency department at 1727 hrs. on 1/4/2018.      Electronically signed by: ADRIANO JERRY MD  Date: 01/04/18  Time: 17:27     Encounter     View Encounter             Vessel Imaging   CTA:  FINDINGS:    Brain and intracranial structures:  Ventricles and sulci are normal.  Gray-white differentiation is preserved. There is no mass lesion, hemorrhage, or acute infarct.    Intracranial Vessels:       Carotid circulation: Patent without large  vessel stenosis or occlusion.       Vertebrobasilar circulation: Patent without large vessel stenosis or occlusion.       Venous structures:    Neck Vessels:       Aortic arch: 3 vessel arch with minimal calcified and noncalcified atherosclerotic plaque.       Carotid circulation: There are no hemodynamically significant stenoses, aneurysmal dilatations, or evidence of dissection.  Mild atherosclerotic narrowing of the proximal left ICA without significant stenosis.       Vertebral circulation: Medial left vertebral artery is congenitally dominant. There is calcified plaque at the origin of the right vertebral artery which is small size throughout its course.    Skull:  Normal.    Scalp: Normal.    Orbits and face (included portions): Normal.    Paranasal sinuses and mastoid air cells (included portions): Normal.    Neck soft tissues: The laryngeal narrowing thought to be second and breath hold.    Spine: Normal.   Impression         Normal Head CT.    Mild atherosclerotic narrowing of the proximal left ICA without significant stenosis.    No evidence of hemodynamically significant stenosis or occlusion.    Narrowing of the laryngeal airway at the level of the cricoid cartilage felt most likely related to breath hold.       Cardiac Imaging   TTE:  CONCLUSIONS     1 - Normal left ventricular systolic function (EF 55-60%).     2 - Normal right ventricular systolic function .     3 - Normal left ventricular diastolic function.

## 2018-01-05 NOTE — ED NOTES
Pt transferred via [facility] from [facility]. Pt was given TPA at [facility]. Per EMS TPA finished prior to arrival. Pt was given Bolus of 8 mg at 1746 and Infusion of 71 mg started at 1747.

## 2018-01-05 NOTE — ASSESSMENT & PLAN NOTE
62 year old male with medical history of prior tobacco abuse was transferred from [facility] after receiving IV-tPA for expressive aphasia, dysarthria and LUQ hemianopsia.  No large vessel occlusion seen for tPA     Antithrombotics for secondary stroke prevention: Antiplatelets: None: Hold all Antithrombotics x 24 hours after IV t-PA administration    Statins for secondary stroke prevention and hyperlipidemia, if present: LDL pending     Aggressive risk factor modification: none     Rehab efforts: PT/OT/SLP to evaluate and treat    Diagnostics ordered/pending: HgbA1C to assess blood glucose levels, Lipid Profile to assess cholesterol levels, MRI head without contrast to assess brain parenchyma, TTE to assess cardiac function/status     VTE prophylaxis: None: Reason for No Pharmacological VTE Prophylaxis: Mechanical prophylaxis: Place SCDs    BP parameters: Infarct: Post tPA, SBP <180

## 2018-01-05 NOTE — ASSESSMENT & PLAN NOTE
s/p tPA administration  Admit to [facility]  Post-TPA protocol  Obtain TSH, hemoglobin A1c, lipid panel, 2D echo  SBP goal 100-180  MRI scheduled for tomorrow at 1700 to assess brain parynchema   Atorvastatin 40 mg daily for secondary stroke prevention  DM management

## 2018-01-05 NOTE — HOSPITAL COURSE
INTERVAL HISTORY-no acute events. MRI today at 3pm and tpa time ends at 6pm. Stable to go to stepdown at that time if no decline in neurologic status.

## 2018-01-05 NOTE — H&P
[facility]  Neurocritical Care  History & Physical    Admit Date: 1/4/2018  Service Date: 01/04/2018  Length of Stay: 0    Subjective:     Chief Complaint: <principal problem not specified>    History of Present Illness: Mr. Sanchez is a 61 y/o male who presents as transfer from [facility] s/p tpa administration for aphasia and dysarthria. Per son, patient has onset of slurred speech and aphasia at approximately 1700 this afternoon. Patient was evaluated by telestroke and given IV tPA at 1751. Patient was sent to [facility] ED for CTA MP, which was negative for LVO. Patient's symptoms already improving.     Vitals:   Temp: 97.9 °F (36.6 °C)  Pulse: 64  BP: (!) 158/77  MAP (mmHg): 107  Resp: 18  SpO2: 97 %  O2 Device (Oxygen Therapy): room air    Temp  Min: 97.4 °F (36.3 °C)  Max: 97.9 °F (36.6 °C)  Pulse  Min: 64  Max: 84  BP  Min: 144/78  Max: 195/102  MAP (mmHg)  Min: 96  Max: 119  Resp  Min: 9  Max: 22  SpO2  Min: 96 %  Max: 99 %    No intake/output data recorded.         Examination:   Constitutional: Well-nourished and -developed. No apparent distress.   Eyes: Conjunctiva clear, anicteric. Lids no lesions.  Head/Ears/Nose/Mouth/Throat/Neck: Moist mucous membranes. External ears, nose atraumatic.   Cardiovascular: Regular rhythm. No murmurs. No leg edema.  Respiratory: Comfortable respirations. Clear to auscultation.  Gastrointestinal: No hernia. Soft, nondistended, nontender. + bowel sounds.    Neurologic:   -[id number]  -Alert. Oriented to person, place, and time. Speech dysarthric and slow. Some slowness with word finding, but able to name items without difficulty. Follows commands.   -Central and peripheral vision intact   -Cranial nerves intact  -Strength 5/5 and symmetric in arms, legs. Tone normal.   -Sensation intact to touch in arms, legs.  -Gait and station normal.    Today I independently reviewed pertinent medications, imaging, lab results,       Assessment/Plan:     Neuro   Aphasia     Improving post-tPA administration  SLP consult         Acute ischemic left MCA stroke    s/p tPA administration  Admit to [facility]  Post-TPA protocol  Obtain TSH, hemoglobin A1c, lipid panel, 2D echo  SBP goal 100-180  MRI scheduled for tomorrow at 1700 to assess brain parynchema   Atorvastatin 40 mg daily for secondary stroke prevention  DM management           Dysarthria    See L MCA stroke         Hematology   Tissue plasminogen activator (t-PA) administered at other facility within 24 hours prior to current admission    See L MCA stroke          Endocrine   Poorly controlled type 2 diabetes mellitus    Hemoglobin A1c 8.7  Diabetic diet  ISS            Prophylaxis:  Venous Thromboembolism: mechanical  Stress Ulcer: NA  Ventilator Pneumonia: not applicable     Activity Orders          None        Full Code    Sunita Chiang PA-C  Neurocritical Care  [facility]

## 2018-01-05 NOTE — HOSPITAL COURSE
62 year old male with medical history of prior tobacco abuse, HTN, DM (untreated) was transferred from [facility] after receiving IV-tPA for dysarthria.  Found to have multiple embolic strokes in left hemisphere.  CTA showed L ICA plaque and was evaluated by vascular surgery.  TCD with emboli detection negative.  No intervention necessary.  Echo within normal limit.  Etiology cardioembolic versus cardioembolic.  30 day event monitor ordered.  ASA 81mg qd once a day, plavix 75mg qd for three months and atorvastatin 40mg qhs was prescribed for stroke prevention.  Newly diagnosed diabetic.  Endocrinology consutled and started metformin and glipizide.  Diabetes education given.  Patient started on lisinopril for hypertension.  He will follow up at [place] or [facility] next week and stroke within 4 weeks.  Therapies evaluated and recommonded outpatient [facility]

## 2018-01-05 NOTE — PROGRESS NOTES
[facility]  Vascular Neurology  Comprehensive Stroke Center  Progress Note    Assessment/Plan:     Poorly controlled type 2 diabetes mellitus    - A1C 8.7   - SSI, accuchecks        Dysarthria    Due to stroke  ST following        Tissue plasminogen activator (t-PA) administered at other facility within 24 hours prior to current admission    Given at [facility]  Admit to [facility] for 24 hours         Stroke due to thrombosis of right posterior cerebral artery    62 year old male with medical history of prior tobacco abuse was transferred from [facility] after receiving IV-tPA for expressive aphasia, dysarthria and LUQ hemianopsia.  No large vessel occlusion seen for tPA     Antithrombotics for secondary stroke prevention: Antiplatelets: None: Hold all Antithrombotics x 24 hours after IV t-PA administration    Statins for secondary stroke prevention and hyperlipidemia, if present: Chol 373, Atorvastatin 40 mg    Aggressive risk factor modification: none     Rehab efforts: PT/OT/SLP to evaluate and treat    Diagnostics ordered/pending: MRI brain  VTE prophylaxis: None: Reason for No Pharmacological VTE Prophylaxis: Mechanical prophylaxis: Place SCDs    BP parameters: Infarct: Post tPA, SBP <180                 1/4: pt received tPA ~ 8 pm, transferred to [facility].      STROKE DOCUMENTATION   Acute Stroke Times   Last Known Normal Date: 01/04/18  Last Known Normal Time: 1700  Symptom Onset Date: 01/04/18  Symptom Onset Time: 1700  Stroke Team Called Date: 01/04/18  Stroke Team Called Time: 1717  Stroke Team Arrival Date: 01/04/18  Stroke Team Arrival Time: 0517  CT Interpretation Time: 1727  Decision to Treat Time for Alteplase: 1727 (Decision to treat 1727 initial bolus given 1746 )  Decision to Treat Time for IR:  (no LVO )    NIH Scale:  Interval: 24 hrs post onset of symptoms +/- 20 mins  1a. Level Of Consciousness: 0-->Alert: keenly responsive  1b. LOC Questions: 0-->Answers both  questions correctly  1c. LOC Commands: 0-->Performs both tasks correctly  2. Best Gaze: 0-->Normal  3. Visual: 0-->No visual loss  4. Facial Palsy: 0-->Normal symmetrical movements  5a. Motor Arm, Left: 0-->No drift: limb holds 90 (or 45) degrees for full 10 secs  5b. Motor Arm, Right: 0-->No drift: limb holds 90 (or 45) degrees for full 10 secs  6a. Motor Leg, Left: 0-->No drift: leg holds 30 degree position for full 5 secs  6b. Motor Leg, Right: 0-->No drift: leg holds 30 degree position for full 5 secs  7. Limb Ataxia: 0-->Absent  8. Sensory: 0-->Normal: no sensory loss  9. Best Language: 0-->No aphasia: normal  10. Dysarthria: 1-->Mild-to-moderate dysarthria: patient slurs at least some words and, at worst, can be understood with some difficulty  11. Extinction and Inattention (formerly Neglect): 0-->No abnormality  Total (NIH Stroke Scale): 1       Modified [place] Score: 0  [place] Coma Scale:    ABCD2 Score:    [id number] Score:   HAS -BLED Score:   ICH Score:   Hunt & Phan Classification:      Hemorrhagic change of an Ischemic Stroke: Does this patient have an ischemic stroke with hemorrhagic changes? No     Neurologic Chief Complaint: stroke    Subjective:     Interval History: Patient is seen for follow-up neurological assessment and treatment recommendations:     Pt reports that dysarthria is resolving. No new complaints.     HPI, Past Medical, Family, and Social History remains the same as documented in the initial encounter.     Review of Systems  Scheduled Meds:   atorvastatin  40 mg Oral Daily    senna-docusate 8.6-50 mg  1 tablet Oral BID    sodium chloride 0.9%  3 mL Intravenous Q8H     Continuous Infusions:   sodium chloride 0.9% 75 mL/hr at 01/05/18 0905     PRN Meds:acetaminophen, dextrose 50%, dextrose 50%, glucagon (human recombinant), glucose, glucose, insulin aspart, ondansetron    Objective:     Vital Signs (Most Recent):  Temp: 98.2 °F (36.8 °C) (01/05/18 0905)  Pulse: 67 (01/05/18  0916)  Resp: 13 (01/05/18 0916)  BP: (!) 178/82 (01/05/18 0916)  SpO2: 98 % (01/05/18 0916)  BP Location: Left arm    Vital Signs Range (Last 24H):  Temp:  [97.4 °F (36.3 °C)-98.2 °F (36.8 °C)]   Pulse:  [60-84]   Resp:  [9-22]   BP: (136-195)/()   SpO2:  [95 %-99 %]   BP Location: Left arm    Physical Exam    Neurological Exam:   Mental Status  Orientation: alert and oriented to person, place, time and situation, memory intact  Language: good fluency, no aphasia, mild dysarthria    Cranial Nerves  Visual acuity grossly intact, PERRLA, EOMI, V1-V3 subjectively intact, smile symmetric, pt closes eyes symmetrically, hearing grossly intact, uvula midline, gag reflex intact, SCM and trapezius 5/5  Bilaterally, tongue protrudes midline    Motor  Normal tone and bulk. No fasciculations.  LUE 5/5  LLE 5/5  RUE 5/5  RLE 5/5  DTRs 2+  Throughout    Sensory  Light touch, vibration, pinprick intact throughout    Cerebellar  Finger to nose intact bilaterally  Heel to shin intact bilaterally    Gait  deferred.      Laboratory:  CMP:   Recent Labs  Lab 01/05/18  0248   CALCIUM 9.6   ALBUMIN 3.2*   PROT 6.7      K 4.6   CO2 21*      BUN 9   CREATININE 1.1   ALKPHOS 92   ALT 25   AST 18   BILITOT 0.3     BMP:   Recent Labs  Lab 01/05/18  0248      K 4.6      CO2 21*   BUN 9   CREATININE 1.1   CALCIUM 9.6     CBC:   Recent Labs  Lab 01/05/18  0248   WBC 6.77   RBC 4.10*   HGB 13.5*   HCT 38.2*      MCV 93   MCH 32.9*   MCHC 35.3     Lipid Panel:   Recent Labs  Lab 01/04/18  2111   CHOL 373*   LDLCALC Invalid, Trig>400.0   HDL 36*   TRIG 1,305*     Coagulation:   Recent Labs  Lab 01/04/18  1722   INR 0.9   APTT 27.6     Platelet Aggregation Study: No results for input(s): PLTAGG, PLTAGINTERP, PLTAGREGLACO, ADPPLTAGGREG in the last 168 hours.  Hgb A1C:   Recent Labs  Lab 01/04/18 2111   HGBA1C 8.7*     TSH:   Recent Labs  Lab 01/04/18 2111   TSH 2.131       Diagnostic Results     Brain Imaging    CT head:  FINDINGS:    Mild age-appropriate involutional changes are noted.  There is intracranial atherosclerosis.  Minimal periventricular white matter chronic micro-ischemic changes are noted.  No evidence of acute intracranial hemorrhage, mass effect, midline deviation, hydrocephalus, or abnormal extra-axial fluid collection is seen. No evidence of acute large vessel territory ischemia is seen. The basilar cisterns are preserved. The visualized paranasal sinuses are clear. The mastoid air cells are grossly clear. No acute displaced calvarial abnormality is appreciated.   Impression       1. No acute intracranial process is identified.    Findings were discussed with Dr. Terrell in the emergency department at 1727 hrs. on 1/4/2018.      Electronically signed by: ADRIANO JERRY MD  Date: 01/04/18  Time: 17:27     Encounter     View Encounter             Vessel Imaging   CTA:  FINDINGS:    Brain and intracranial structures:  Ventricles and sulci are normal.  Gray-white differentiation is preserved. There is no mass lesion, hemorrhage, or acute infarct.    Intracranial Vessels:       Carotid circulation: Patent without large vessel stenosis or occlusion.       Vertebrobasilar circulation: Patent without large vessel stenosis or occlusion.       Venous structures:    Neck Vessels:       Aortic arch: 3 vessel arch with minimal calcified and noncalcified atherosclerotic plaque.       Carotid circulation: There are no hemodynamically significant stenoses, aneurysmal dilatations, or evidence of dissection.  Mild atherosclerotic narrowing of the proximal left ICA without significant stenosis.       Vertebral circulation: Medial left vertebral artery is congenitally dominant. There is calcified plaque at the origin of the right vertebral artery which is small size throughout its course.    Skull:  Normal.    Scalp: Normal.    Orbits and face (included portions): Normal.    Paranasal sinuses and mastoid air cells (included  portions): Normal.    Neck soft tissues: The laryngeal narrowing thought to be second and breath hold.    Spine: Normal.   Impression         Normal Head CT.    Mild atherosclerotic narrowing of the proximal left ICA without significant stenosis.    No evidence of hemodynamically significant stenosis or occlusion.    Narrowing of the laryngeal airway at the level of the cricoid cartilage felt most likely related to breath hold.       Cardiac Imaging   TTE:  CONCLUSIONS     1 - Normal left ventricular systolic function (EF 55-60%).     2 - Normal right ventricular systolic function .     3 - Normal left ventricular diastolic function.       Payton Leung MD  Comprehensive Stroke Center  Department of Vascular Neurology   [facility]

## 2018-01-05 NOTE — ASSESSMENT & PLAN NOTE
s/p tPA administration  Post-TPA protocol  SBP goal 100-180  MRI pending   Atorvastatin 40 mg daily for secondary stroke prevention  DM management   Echo pending

## 2018-01-05 NOTE — PLAN OF CARE
Problem: Physical Therapy Goal  Goal: Physical Therapy Goal  Outcome: Outcome(s) achieved Date Met: 01/05/18  Initial eval completed.  Results, POC, and therapy recommendations discussed with patient.  Complete evaluation documentation to follow.     Pt safe to ambulate without assistance.  No additional PT needs at this time.     Veronica Perez, PT  1/5/2018  [phone] (pager)

## 2018-01-05 NOTE — ASSESSMENT & PLAN NOTE
Given at [facility]  Admit to [facility] for 24 hours

## 2018-01-05 NOTE — PLAN OF CARE
Problem: SLP Goal  Goal: SLP Goal  Speech Language Pathology Goals  Goals expected to be met by 1/12  1. Pt will tolerate regular diet with thin liquids with adequate oral clearance and no overt s/s aspiration.  2. Pt will demonstrate independence with OME's for home program.   3. Pt will recall 3/3 simple speech strategies Indptly.  4. Pt will complete mod complex word finding tasks with 80% accy with min cues.        Outcome: Ongoing (interventions implemented as appropriate)  Evaluation completed.  Rec regular diet with thin liquids with strict aspiration precaution.  Home health vs Outpatient ST recommended pending OT recs. KARMA Plaza, CCC/SLP  1/5/2018

## 2018-01-05 NOTE — PROGRESS NOTES
[facility]  Neurocritical Care  Progress Note    Admit Date: 1/4/2018  Service Date: 01/05/2018  Length of Stay: 1    Subjective:     Chief Complaint: Embolic stroke involving left middle cerebral artery    History of Present Illness: Mr. Sanchez is a 61 y/o male who presents as transfer from [facility] s/p tpa administration for aphasia and dysarthria. Per son, patient has onset of slurred speech and aphasia at approximately 1700 this afternoon. Patient was evaluated by telestroke and given IV tPA at 1751. Patient was sent to [facility] ED for CTA MP, which was negative for LVO. Patient's symptoms already improving.     Hospital Course:     INTERVAL HISTORY-no acute events. MRI today at 3pm and tpa time ends at 6pm. Stable to go to stepdown at that time if no decline in neurologic status.         Review of Systems:Constitutional: Denies fevers or chills.  Pulmonary: Denies shortness of breath or cough.  Cardiology: Denies chest pain or palpitations.  GI: Denies abdominal pain or constipation.  Neurologic: Denies new weakness, headache, or paresthesias.  Vitals:   Temp: 98.1 °F (36.7 °C)  Pulse: 80  Rhythm: normal sinus rhythm  BP: (!) 165/85  MAP (mmHg): 117  Resp: 10  SpO2: 96 %  O2 Device (Oxygen Therapy): room air    Temp  Min: 97.9 °F (36.6 °C)  Max: 98.2 °F (36.8 °C)  Pulse  Min: 60  Max: 96  BP  Min: 136/73  Max: 195/102  MAP (mmHg)  Min: 97  Max: 121  Resp  Min: 10  Max: 22  SpO2  Min: 95 %  Max: 99 %    No intake/output data recorded.   Unmeasured Output  Urine Occurrence: 0     Examination:   Constitutional: Well-nourished and -developed. No apparent distress.   Eyes: Conjunctiva clear, anicteric. Lids no lesions.  Head/Ears/Nose/Mouth/Throat/Neck: Moist mucous membranes. External ears, nose atraumatic.   Cardiovascular: Regular rhythm. No murmurs. No leg edema.  Respiratory: Comfortable respirations. Clear to auscultation.  Gastrointestinal: No hernia. Soft, nondistended, nontender.  + bowel sounds.    Neurologic:  -GCS E4V4M  -Alert. Oriented to person, place, and time. Follows commands usually, slow to answer some questions. Dysarthria slight. No facial droop appreciated.   -CN 2-12 intact  -Motor no hemiparesis or sensory deficit      Medications:   Continuous Scheduled    aspirin 325 mg Daily   atorvastatin 40 mg Daily   heparin (porcine) 5,000 Units Q8H   insulin aspart 4 Units TIDWM   insulin detemir 6 Units BID   lisinopril 10 mg Daily   senna-docusate 8.6-50 mg 1 tablet BID   sodium chloride 0.9% 3 mL Q8H   PRN    acetaminophen 650 mg Q6H PRN   dextrose 50% 12.5 g PRN   dextrose 50% 25 g PRN   glucagon (human recombinant) 1 mg PRN   glucose 16 g PRN   glucose 24 g PRN   insulin aspart 1-10 Units QID (AC + HS) PRN   ondansetron 8 mg Q8H PRN      Today I independently reviewed pertinent medications, lines/drains/airways, imaging, cardiology, notably: echocardiogram- no vegetations/thrombi seen , normal EF      Assessment/Plan:     Neuro   * Embolic stroke involving left middle cerebral artery    s/p tPA administration  No LVO  MRI pending  SBP goal 100-180  Atorvastatin 40 mg daily for secondary stroke prevention  DM management , see below  Echo wnl  Asa and sqh on hold until post tpa window        Aphasia    Improving post-tPA administration  SLP consult         Dysarthria    See L MCA stroke         Hematology   Tissue plasminogen activator (t-PA) administered at other facility within 24 hours prior to current admission    See L MCA stroke          Endocrine   Poorly controlled type 2 diabetes mellitus    Hemoglobin A1c 8.7  Diabetic diet  ISS  Added LA insulin today, improved glycemic control             Prophylaxis:  Venous Thromboembolism: mechanical  Stress Ulcer: NA  Ventilator Pneumonia: not applicable     Activity Orders          None        Full Code    Rosa Perez PA-C  Neurocritical Care  [facility]

## 2018-01-05 NOTE — HPI
62 year old male with medical history of prior tobacco abuse, HTN, DM (untreated) was [place] after receiving IV-tPA for speech difficulty.  Son said he started coughing and noticed slurred speech.  He is having difficulty finding some of his words.  Symptoms are starting to improve.  Patient was hypertensive and required one dose of IV labetalol in CT scanner.  CTA MP done and no large vessel occlusion seen.  He is having trouble with his vision in left eye but denies gait instability, numbness or weakness.

## 2018-01-05 NOTE — CONSULTS
[facility]  Vascular Neurology  Comprehensive Stroke Center  Consult Note    Consults  Assessment/Plan:     Patient is a 62 y.o. year old male with:    Stroke due to thrombosis of right posterior cerebral artery    62 year old male with medical history of prior tobacco abuse was transferred from [facility] after receiving IV-tPA for expressive aphasia, dysarthria and LUQ hemianopsia.  No large vessel occlusion seen for tPA     Antithrombotics for secondary stroke prevention: Antiplatelets: None: Hold all Antithrombotics x 24 hours after IV t-PA administration    Statins for secondary stroke prevention and hyperlipidemia, if present: LDL pending     Aggressive risk factor modification: none     Rehab efforts: PT/OT/SLP to evaluate and treat    Diagnostics ordered/pending: HgbA1C to assess blood glucose levels, Lipid Profile to assess cholesterol levels, MRI head without contrast to assess brain parenchyma, TTE to assess cardiac function/status     VTE prophylaxis: None: Reason for No Pharmacological VTE Prophylaxis: Mechanical prophylaxis: Place SCDs    BP parameters: Infarct: Post tPA, SBP <180            Dysarthria    Due to stroke  ST        Tissue plasminogen activator (t-PA) administered at other facility within 24 hours prior to current admission    Given at [facility]  Admit to [facility] for 24 hours             STROKE DOCUMENTATION     Acute Stroke Times   Last Known Normal Date: 01/04/18  Last Known Normal Time: 1700  Symptom Onset Date: 01/04/18  Symptom Onset Time: 1700  Stroke Team Called Date: 01/04/18  Stroke Team Called Time: 1717  Stroke Team Arrival Date: 01/04/18  Stroke Team Arrival Time: 0517  CT Interpretation Time: 1727  Decision to Treat Time for Alteplase: 1727 (Decision to treat 1727 initial bolus given 1746 )  Decision to Treat Time for IR:  (no LVO )    NIH Scale:  Interval: 1hour post tPA or Endovascular procedure completion  1a. Level Of Consciousness: 0-->Alert:  keenly responsive  1b. LOC Questions: 1-->Answers one question correctly  1c. LOC Commands: 0-->Performs both tasks correctly  2. Best Gaze: 0-->Normal  3. Visual: 1-->Partial hemianopia  4. Facial Palsy: 0-->Normal symmetrical movements  5a. Motor Arm, Left: 0-->No drift: limb holds 90 (or 45) degrees for full 10 secs  5b. Motor Arm, Right: 0-->No drift: limb holds 90 (or 45) degrees for full 10 secs  6a. Motor Leg, Left: 0-->No drift: leg holds 30 degree position for full 5 secs  6b. Motor Leg, Right: 0-->No drift: leg holds 30 degree position for full 5 secs  7. Limb Ataxia: 0-->Absent  8. Sensory: 0-->Normal: no sensory loss  9. Best Language: 1-->Mild-to-moderate aphasia: some obvious loss of fluency or facility of comprehension, without significant limitation on ideas expressed or form of expression. Reduction of speech and/or comprehension, however, makes conversation. . . (see row details)  10. Dysarthria: 2-->Severe dysarthria: patients speech is so slurred as to be unintelligible in the absence of or out of proportion to any dysphasia, or is mute/anarthric  11. Extinction and Inattention (formerly Neglect): 0-->No abnormality  Total (NIH Stroke Scale): 5    Modified [place] Score: 0  Vincent Coma Scale:15   ABCD2 Score:    [id number] Score:   HAS -BLED Score:   ICH Score:   Hunt & Phan Classification:       Thrombolysis Candidate? Yes, given prior to arrival at outside hospital      Interventional Revascularization Candidate?   Is the patient eligible for mechanical endovascular reperfusion (TAWNYA)?  No; No large vessel occlusion      Hemorrhagic change of an Ischemic Stroke: Does this patient have an ischemic stroke with hemorrhagic changes? No     Subjective:     History of Present Illness:   62 year old male with medical history of prior tobacco abuse was transferred from [facility] after receiving IV-tPA for speech difficulty.  Son said he started coughing and noticed slurred speech.  He is  having difficulty finding some of his words.  Symptoms are starting to improve.  Patient was hypertensive and required one dose of IV labetalol in CT scanner.  CTA MP done and no large vessel occlusion seen.  He is having trouble with his vision in left eye but denies gait instability, numbness or weakness.              History reviewed. No pertinent past medical history.  History reviewed. No pertinent surgical history.  Family History   Problem Relation Age of Onset    Diabetes Mother      Social History   Substance Use Topics    Smoking status: Former Smoker    Smokeless tobacco: Never Used    Alcohol use No     Review of patient's allergies indicates:  No Known Allergies    Medications: I have reviewed the current medication administration record.      (Not in a hospital admission)    Review of Systems   Constitutional: Negative for chills and fever.   HENT: Negative for drooling and rhinorrhea.    Eyes: Positive for visual disturbance. Negative for redness.   Respiratory: Negative for cough and shortness of breath.    Cardiovascular: Negative for chest pain and palpitations.   Gastrointestinal: Negative for diarrhea and vomiting.   Genitourinary: Negative for frequency and urgency.   Musculoskeletal: Negative for arthralgias and gait problem.   Skin: Negative for pallor and rash.   Neurological: Negative for facial asymmetry, weakness and headaches.   Psychiatric/Behavioral: Negative for agitation and behavioral problems.     Objective:     Vital Signs (Most Recent):  Temp: 97.9 °F (36.6 °C) (01/04/18 1908)  Pulse: 72 (01/04/18 2016)  Resp: 18 (01/04/18 2016)  BP: (!) 158/99 (01/04/18 2016)  SpO2: 98 % (01/04/18 2016)    Vital Signs Range (Last 24H):  Temp:  [97.4 °F (36.3 °C)-97.9 °F (36.6 °C)]   Pulse:  [67-84]   Resp:  [9-22]   BP: (144-195)/()   SpO2:  [97 %-99 %]     Physical Exam   Constitutional: He appears well-developed and well-nourished.   HENT:   Head: Normocephalic and atraumatic.   Eyes:  EOM are normal. Pupils are equal, round, and reactive to light.   Neck: Normal range of motion. Neck supple.   Cardiovascular: Normal rate and regular rhythm.    Pulmonary/Chest: Effort normal. No respiratory distress.   Abdominal: Soft. He exhibits no distension.   Musculoskeletal: Normal range of motion. He exhibits no deformity.   Neurological:   See below     Skin: Skin is warm and dry.       Neurological Exam:   LOC: alert  Attention Span: Good   Language: Expressive aphasia  Articulation: Dysarthria  Orientation: Person, Place, Time   Visual Fields: Hemianopsia left upper quadrant   EOM (CN III, IV, VI): unable to look up   Pupils (CN II, III):PERRL   Facial Sensation (CN V): equal   Facial Movement (CN VII): Symmetric facial expression    Gag Reflex: not examined  Reflexes: not examined  Motor: 5/5 in all extremities   Cebellar: normal   Sensation: Intact to light touch, temperature and vibration  Tone: Normal tone throughout      Laboratory:  CMP:   Recent Labs  Lab 01/04/18  1711   CALCIUM 10.0   ALBUMIN 4.3   PROT 8.1      K 4.5   CO2 26      BUN 11   CREATININE 0.77   ALKPHOS 113   ALT 48*   AST 25   BILITOT 0.5     CBC:   Recent Labs  Lab 01/04/18  1711   WBC 8.77   RBC 4.62   HGB 15.7   HCT 43.1      MCV 93   MCH 34.0*   MCHC 36.4*     Lipid Panel: No results for input(s): CHOL, LDLCALC, HDL, TRIG in the last 168 hours.  Hgb A1C: No results for input(s): HGBA1C in the last 168 hours.  TSH: No results for input(s): TSH in the last 168 hours.    Diagnostic Results:      Brain imaging:      Vessel Imaging:  CTA  01/04/18  -no large vessel occlusion  -minor L ICA atherosclerosis  -dominant L vert    Cardiac Evaluation:         Estrella Loyd PA-C  [facility]  Department of Vascular Neurology   [facility]

## 2018-01-05 NOTE — CONSULTS
Inpatient consult to Physical Medicine Rehab  Consult performed by: LORI COLMENARES  Consult ordered by: ZENON GUERRERO  Reason for consult: assess rehab needs        Reviewed patient history and current admission.  Rehab team following.  Full consult to follow.    KARINA Beauchamp, FNP-C  Physical Medicine & Rehabilitation   01/05/2018  Spectralink: [id number]

## 2018-01-05 NOTE — HPI
Mr. Sanchez is a 61 y/o male who presents as transfer from [facility] s/p tpa administration for aphasia and dysarthria. Per son, patient has onset of slurred speech and aphasia at approximately 1700 this afternoon. Patient was evaluated by telestroke and given IV tPA at 1751. Patient was sent to [facility] ED for CTA MP, which was negative for LVO. Patient's symptoms already improving.

## 2018-01-06 VITALS
SYSTOLIC BLOOD PRESSURE: 140 MMHG | DIASTOLIC BLOOD PRESSURE: 84 MMHG | HEART RATE: 92 BPM | TEMPERATURE: 99 F | WEIGHT: 190.69 LBS | OXYGEN SATURATION: 95 % | HEIGHT: 71 IN | RESPIRATION RATE: 18 BRPM | BODY MASS INDEX: 26.7 KG/M2

## 2018-01-06 PROBLEM — E78.1 HYPERTRIGLYCERIDEMIA: Status: ACTIVE | Noted: 2018-01-06

## 2018-01-06 PROBLEM — I63.432 EMBOLIC STROKE INVOLVING LEFT POSTERIOR CEREBRAL ARTERY: Status: ACTIVE | Noted: 2018-01-06

## 2018-01-06 PROBLEM — I10 ESSENTIAL HYPERTENSION: Status: ACTIVE | Noted: 2018-01-06

## 2018-01-06 PROBLEM — E11.49 TYPE 2 DIABETES MELLITUS WITH NEUROLOGICAL COMPLICATIONS: Status: ACTIVE | Noted: 2018-01-04

## 2018-01-06 LAB
ALBUMIN SERPL BCP-MCNC: 3.4 G/DL
ALP SERPL-CCNC: 85 U/L
ALT SERPL W/O P-5'-P-CCNC: 24 U/L
ANION GAP SERPL CALC-SCNC: 7 MMOL/L
AST SERPL-CCNC: 14 U/L
BASOPHILS # BLD AUTO: 0.04 K/UL
BASOPHILS NFR BLD: 0.5 %
BILIRUB SERPL-MCNC: 0.8 MG/DL
BUN SERPL-MCNC: 11 MG/DL
CALCIUM SERPL-MCNC: 10 MG/DL
CHLORIDE SERPL-SCNC: 103 MMOL/L
CHOLEST SERPL-MCNC: 315 MG/DL
CHOLEST/HDLC SERPL: 9 {RATIO}
CO2 SERPL-SCNC: 27 MMOL/L
CREAT SERPL-MCNC: 0.9 MG/DL
DIFFERENTIAL METHOD: ABNORMAL
EOSINOPHIL # BLD AUTO: 0.1 K/UL
EOSINOPHIL NFR BLD: 1 %
ERYTHROCYTE [DISTWIDTH] IN BLOOD BY AUTOMATED COUNT: 12.4 %
EST. GFR  (AFRICAN AMERICAN): >60 ML/MIN/1.73 M^2
EST. GFR  (NON AFRICAN AMERICAN): >60 ML/MIN/1.73 M^2
GLUCOSE SERPL-MCNC: 182 MG/DL
HCT VFR BLD AUTO: 44.9 %
HDLC SERPL-MCNC: 35 MG/DL
HDLC SERPL: 11.1 %
HGB BLD-MCNC: 15.5 G/DL
IMM GRANULOCYTES # BLD AUTO: 0.02 K/UL
IMM GRANULOCYTES NFR BLD AUTO: 0.3 %
LDLC SERPL CALC-MCNC: ABNORMAL MG/DL
LYMPHOCYTES # BLD AUTO: 2.9 K/UL
LYMPHOCYTES NFR BLD: 36.8 %
MAGNESIUM SERPL-MCNC: 2.1 MG/DL
MCH RBC QN AUTO: 32.7 PG
MCHC RBC AUTO-ENTMCNC: 34.5 G/DL
MCV RBC AUTO: 95 FL
MONOCYTES # BLD AUTO: 0.5 K/UL
MONOCYTES NFR BLD: 5.7 %
NEUTROPHILS # BLD AUTO: 4.4 K/UL
NEUTROPHILS NFR BLD: 55.7 %
NONHDLC SERPL-MCNC: 280 MG/DL
NRBC BLD-RTO: 0 /100 WBC
PHOSPHATE SERPL-MCNC: 2.9 MG/DL
PLATELET # BLD AUTO: 221 K/UL
PMV BLD AUTO: 9.9 FL
POCT GLUCOSE: 171 MG/DL (ref 70–110)
POCT GLUCOSE: 180 MG/DL (ref 70–110)
POTASSIUM SERPL-SCNC: 4.1 MMOL/L
PROT SERPL-MCNC: 6.9 G/DL
RBC # BLD AUTO: 4.74 M/UL
SODIUM SERPL-SCNC: 137 MMOL/L
TRIGL SERPL-MCNC: 765 MG/DL
WBC # BLD AUTO: 7.86 K/UL

## 2018-01-06 PROCEDURE — 86341 ISLET CELL ANTIBODY: CPT

## 2018-01-06 PROCEDURE — 99232 SBSQ HOSP IP/OBS MODERATE 35: CPT | Mod: ,,, | Performed by: NURSE PRACTITIONER

## 2018-01-06 PROCEDURE — 97802 MEDICAL NUTRITION INDIV IN: CPT

## 2018-01-06 PROCEDURE — 85025 COMPLETE CBC W/AUTO DIFF WBC: CPT

## 2018-01-06 PROCEDURE — 36415 COLL VENOUS BLD VENIPUNCTURE: CPT

## 2018-01-06 PROCEDURE — 63600175 PHARM REV CODE 636 W HCPCS: Performed by: PHYSICIAN ASSISTANT

## 2018-01-06 PROCEDURE — 25000003 PHARM REV CODE 250: Performed by: PHYSICIAN ASSISTANT

## 2018-01-06 PROCEDURE — 83735 ASSAY OF MAGNESIUM: CPT

## 2018-01-06 PROCEDURE — 99223 1ST HOSP IP/OBS HIGH 75: CPT | Mod: ,,, | Performed by: SURGERY

## 2018-01-06 PROCEDURE — 99222 1ST HOSP IP/OBS MODERATE 55: CPT | Mod: ,,, | Performed by: INTERNAL MEDICINE

## 2018-01-06 PROCEDURE — A4216 STERILE WATER/SALINE, 10 ML: HCPCS | Performed by: PHYSICIAN ASSISTANT

## 2018-01-06 PROCEDURE — 80061 LIPID PANEL: CPT

## 2018-01-06 PROCEDURE — 84100 ASSAY OF PHOSPHORUS: CPT

## 2018-01-06 PROCEDURE — 99233 SBSQ HOSP IP/OBS HIGH 50: CPT | Mod: ,,, | Performed by: PSYCHIATRY & NEUROLOGY

## 2018-01-06 PROCEDURE — 25000003 PHARM REV CODE 250: Performed by: STUDENT IN AN ORGANIZED HEALTH CARE EDUCATION/TRAINING PROGRAM

## 2018-01-06 PROCEDURE — 93892 TCD EMBOLI DETECT W/O INJ: CPT | Performed by: PSYCHIATRY & NEUROLOGY

## 2018-01-06 PROCEDURE — 80053 COMPREHEN METABOLIC PANEL: CPT

## 2018-01-06 PROCEDURE — 25000003 PHARM REV CODE 250: Performed by: NURSE PRACTITIONER

## 2018-01-06 RX ORDER — CLOPIDOGREL BISULFATE 75 MG/1
300 TABLET ORAL ONCE
Status: COMPLETED | OUTPATIENT
Start: 2018-01-06 | End: 2018-01-06

## 2018-01-06 RX ORDER — METFORMIN HYDROCHLORIDE 500 MG/1
500 TABLET ORAL 2 TIMES DAILY WITH MEALS
Qty: 70 TABLET | Refills: 0 | Status: SHIPPED | OUTPATIENT
Start: 2018-01-06 | End: 2019-01-06

## 2018-01-06 RX ORDER — METFORMIN HYDROCHLORIDE 500 MG/1
TABLET ORAL
Qty: 70 TABLET | Refills: 0 | Status: SHIPPED | OUTPATIENT
Start: 2018-01-06 | End: 2021-01-11

## 2018-01-06 RX ORDER — GLIPIZIDE 5 MG/1
5 TABLET ORAL
Qty: 30 TABLET | Refills: 0 | Status: SHIPPED | OUTPATIENT
Start: 2018-01-06 | End: 2021-04-07

## 2018-01-06 RX ORDER — METFORMIN HYDROCHLORIDE 500 MG/1
500 TABLET ORAL 2 TIMES DAILY WITH MEALS
Status: DISCONTINUED | OUTPATIENT
Start: 2018-01-06 | End: 2018-01-06 | Stop reason: HOSPADM

## 2018-01-06 RX ORDER — CLOPIDOGREL BISULFATE 75 MG/1
75 TABLET ORAL DAILY
Qty: 30 TABLET | Refills: 0 | Status: SHIPPED | OUTPATIENT
Start: 2018-01-07 | End: 2021-01-11

## 2018-01-06 RX ORDER — ATORVASTATIN CALCIUM 80 MG/1
80 TABLET, FILM COATED ORAL DAILY
Qty: 30 TABLET | Refills: 0 | Status: SHIPPED | OUTPATIENT
Start: 2018-01-07 | End: 2021-01-11

## 2018-01-06 RX ORDER — INSULIN ASPART 100 [IU]/ML
0-5 INJECTION, SOLUTION INTRAVENOUS; SUBCUTANEOUS
Status: DISCONTINUED | OUTPATIENT
Start: 2018-01-06 | End: 2018-01-06 | Stop reason: HOSPADM

## 2018-01-06 RX ORDER — LISINOPRIL 10 MG/1
10 TABLET ORAL DAILY
Qty: 30 TABLET | Refills: 0 | Status: SHIPPED | OUTPATIENT
Start: 2018-01-07 | End: 2021-01-11

## 2018-01-06 RX ORDER — CLOPIDOGREL BISULFATE 75 MG/1
75 TABLET ORAL DAILY
Status: DISCONTINUED | OUTPATIENT
Start: 2018-01-07 | End: 2018-01-06 | Stop reason: HOSPADM

## 2018-01-06 RX ORDER — INSULIN PUMP SYRINGE, 3 ML
EACH MISCELLANEOUS
Qty: 1 EACH | Refills: 0 | Status: SHIPPED | OUTPATIENT
Start: 2018-01-06 | End: 2021-01-11

## 2018-01-06 RX ORDER — ATORVASTATIN CALCIUM 20 MG/1
80 TABLET, FILM COATED ORAL DAILY
Status: DISCONTINUED | OUTPATIENT
Start: 2018-01-06 | End: 2018-01-06 | Stop reason: HOSPADM

## 2018-01-06 RX ORDER — LANCETS
1 EACH MISCELLANEOUS 2 TIMES DAILY
Qty: 100 EACH | Refills: 0 | Status: SHIPPED | OUTPATIENT
Start: 2018-01-06

## 2018-01-06 RX ADMIN — INSULIN ASPART 4 UNITS: 100 INJECTION, SOLUTION INTRAVENOUS; SUBCUTANEOUS at 12:01

## 2018-01-06 RX ADMIN — LISINOPRIL 10 MG: 10 TABLET ORAL at 09:01

## 2018-01-06 RX ADMIN — STANDARDIZED SENNA CONCENTRATE AND DOCUSATE SODIUM 1 TABLET: 8.6; 5 TABLET, FILM COATED ORAL at 09:01

## 2018-01-06 RX ADMIN — HEPARIN SODIUM 5000 UNITS: 5000 INJECTION, SOLUTION INTRAVENOUS; SUBCUTANEOUS at 06:01

## 2018-01-06 RX ADMIN — INSULIN ASPART 2 UNITS: 100 INJECTION, SOLUTION INTRAVENOUS; SUBCUTANEOUS at 09:01

## 2018-01-06 RX ADMIN — INSULIN ASPART 4 UNITS: 100 INJECTION, SOLUTION INTRAVENOUS; SUBCUTANEOUS at 09:01

## 2018-01-06 RX ADMIN — SODIUM CHLORIDE, PRESERVATIVE FREE 3 ML: 5 INJECTION INTRAVENOUS at 06:01

## 2018-01-06 RX ADMIN — ASPIRIN 325 MG ORAL TABLET 325 MG: 325 PILL ORAL at 09:01

## 2018-01-06 RX ADMIN — ATORVASTATIN CALCIUM 80 MG: 20 TABLET, FILM COATED ORAL at 09:01

## 2018-01-06 RX ADMIN — CLOPIDOGREL 300 MG: 75 TABLET, FILM COATED ORAL at 06:01

## 2018-01-06 RX ADMIN — INSULIN DETEMIR 6 UNITS: 100 INJECTION, SOLUTION SUBCUTANEOUS at 09:01

## 2018-01-06 NOTE — HPI
Duane Raines is a 62 y.o. male who presented as a transfer from [facility] after TPA administration after having presented there with acute onset aphasia and dysarthria. Per son, and now per the patient, he was in mid conversation when all of a sudden he had slurred speech and aphasia. He has since improved significantly, however continues with dysarthric symptoms, he denies any aphasia at this time. He is otherwise asymptomatic. Denies any such previous symptoms. Reportedly does not see a doctor with any regularity.

## 2018-01-06 NOTE — SUBJECTIVE & OBJECTIVE
No prescriptions prior to admission.       Review of patient's allergies indicates:  No Known Allergies    History reviewed. No pertinent past medical history.  History reviewed. No pertinent surgical history.  Family History     Problem Relation (Age of Onset)    Diabetes Mother        Social History Main Topics    Smoking status: Former Smoker    Smokeless tobacco: Never Used    Alcohol use No    Drug use: No    Sexual activity: Not on file     Review of Systems   Constitutional: Negative for chills and fever.   Respiratory: Negative for chest tightness and shortness of breath.    Gastrointestinal: Negative for abdominal pain, nausea and vomiting.   Genitourinary: Negative.    Neurological: Positive for speech difficulty. Negative for dizziness, syncope, facial asymmetry, weakness, light-headedness, numbness and headaches.   Psychiatric/Behavioral: Negative.      Objective:     Vital Signs (Most Recent):  Temp: 97.9 °F (36.6 °C) (01/06/18 0830)  Pulse: 77 (01/06/18 0830)  Resp: 18 (01/06/18 0830)  BP: (!) 161/74 (01/06/18 0830)  SpO2: 97 % (01/06/18 0830) Vital Signs (24h Range):  Temp:  [97.4 °F (36.3 °C)-98.5 °F (36.9 °C)] 97.9 °F (36.6 °C)  Pulse:  [67-96] 77  Resp:  [10-22] 18  SpO2:  [94 %-99 %] 97 %  BP: (127-181)/(70-86) 161/74     Weight: 86.5 kg (190 lb 11.2 oz)  Body mass index is 26.6 kg/m².    Physical Exam   Constitutional: He is oriented to person, place, and time. He appears well-developed and well-nourished. No distress.   HENT:   Head: Normocephalic and atraumatic.   Neck: No tracheal deviation present.   Cardiovascular: Normal rate.    Pulmonary/Chest: Effort normal. No respiratory distress.   Abdominal: Soft. He exhibits no distension.   Neurological: He is alert and oriented to person, place, and time.   Speech difficulties, dysarthric, no word finding difficulties, no motor or sensory deficits   Psychiatric: He has a normal mood and affect. His behavior is normal.       Significant  Labs:  Amylase: No results for input(s): AMYLASE in the last 48 hours.  CBC:   Recent Labs  Lab 01/06/18  0419   WBC 7.86   RBC 4.74   HGB 15.5   HCT 44.9      MCV 95   MCH 32.7*   MCHC 34.5     CMP:   Recent Labs  Lab 01/06/18 0419   *   CALCIUM 10.0   ALBUMIN 3.4*   PROT 6.9      K 4.1   CO2 27      BUN 11   CREATININE 0.9   ALKPHOS 85   ALT 24   AST 14   BILITOT 0.8     Lactic Acid: No results for input(s): LACTATE in the last 48 hours.  Lipase: No results for input(s): LIPASE in the last 48 hours.    Significant Diagnostics:  I have reviewed all pertinent imaging results/findings within the past 24 hours.

## 2018-01-06 NOTE — ASSESSMENT & PLAN NOTE
Functional status: see hospital course  Cognitive/Speech/Language status:  Mild aphasia, mild dysarthria, and mild dysphagia.   Nutrition/Swallow Status:  Passed bedside swallow evaluation.  SLP recommended regular diet and thin liquids.    Recommendations  -  Encourage mobility, OOB in chair at least 3 hours per day, and early ambulation as appropriate   -  PT/OT evaluate and treat  -  SLP speech and cognitive evaluate and treat  -  Monitor sleep disturbances and establish consistent sleep-wake cycle  -  Monitor for bowel and bladder dysfunction  -  Monitor for shoulder pain and subluxation  -  Monitor for spasticity  -  Monitor for and prevent skin breakdown and pressure ulcers  · Early mobility, repositioning/weight shifting every 20-30 minutes when sitting, turn patient every 2 hours, proper mattress/overlay and chair cushioning, pressure relief/heel protector boots  -  DVT prophylaxis:  [facility]  -  Reviewed discharge options (IP rehab, SNF, HH therapy, and OP therapy)

## 2018-01-06 NOTE — SUBJECTIVE & OBJECTIVE
PMH, PSH, FH, SH updated and reviewed     Review of Systems   Constitutional: Negative for unexpected weight change.   Eyes: Positive for visual disturbance (chronic decreased vision, attributed to previous welding history).   Respiratory: Negative for shortness of breath.    Cardiovascular: Negative for chest pain.   Gastrointestinal: Negative for abdominal pain.   Endocrine: Positive for polydipsia and polyuria.   Genitourinary: Negative for urgency.   Musculoskeletal: Negative for arthralgias.   Skin: Negative for wound.   Neurological: Positive for speech difficulty. Negative for headaches.        Tingling and pain in feet   Hematological: Does not bruise/bleed easily.   Psychiatric/Behavioral: Negative for sleep disturbance.       PHYSICAL EXAMINATION:  Vitals:    01/06/18 1150   BP:    Pulse: 88   Resp:    Temp:      Body mass index is 26.6 kg/m².    Physical Exam   Constitutional: He appears well-developed and well-nourished. No distress.   HENT:   Right Ear: External ear normal.   Left Ear: External ear normal.   Nose: Nose normal.   Hearing grossly normal  Dentition grossly normal   Cardiovascular: Normal rate.    No murmur heard.  Pulmonary/Chest: Effort normal and breath sounds normal.   Abdominal: Soft. He exhibits no mass. There is no tenderness.   Musculoskeletal: He exhibits no edema.   No digital clubbing or extremity cyanosis   Neurological: No cranial nerve deficit. Coordination normal.   Noted expressive aphasia.   Skin: Skin is warm and dry. No rash noted.   No subcutaneous nodules noted.   Psychiatric: He has a normal mood and affect. His behavior is normal.   Alert and oriented to person, place, and situation.   Nursing note and vitals reviewed.

## 2018-01-06 NOTE — HOSPITAL COURSE
1/5/18: OT evaluation pending.  Evaluated by PT.  Bed mobility (I) .  Sit to stand (I) and transfers SBA/SV.  Ambulated 20ft SV with tandem gait performed x 5 ft.  SLP noted aphasia, dysphagia, and dysarthria.

## 2018-01-06 NOTE — HPI
Reason for Consult: Management of diabetes, Hyperglycemia     Diabetes diagnosis year: This admission    Home Diabetes Medications:  none, new onset    Diabetes Complications include:     Neuropathy    Complicating diabetes co morbidities:   CVA this admission  Tobacco abuse    HPI:   Patient is a 62 y.o. male with no significant past medical history (has not seen a doctor in many years), presents with acute onset of aphasia on 1/4 and found to have an MCA stroke. He received tPA and currently being treated for his stroke. He was found to be diabetic with an A1c of 8.7. He reports being told when he was 10 years old that he was diabetic, but that it went away and he's never had any trouble since. His mother was diabetic, and would intermittently check all her family members' blood glucose. He does not think it has ever been high before. He reports symptoms of polyuria, polydipsia, and tingling pain in his bilateral feet. Symptoms have been present for about 3 months, and are steady in progression. We have been consulted to assist with diabetes management. Of note, he has no health insurance and is on a fixed income with social security.

## 2018-01-06 NOTE — ASSESSMENT & PLAN NOTE
s/p tPA administration  No LVO  MRI pending  SBP goal 100-180  Atorvastatin 40 mg daily for secondary stroke prevention  DM management , see below  Echo wnl  Asa and sqh on hold until post tpa window

## 2018-01-06 NOTE — ASSESSMENT & PLAN NOTE
Duane Raines is a 62 y.o. male with left sided multifocal small/punctate infarcts involving the left frontal, parietal, and occipital lobes with evidence of petechial hemorrhage involving the left frontal lobe, who continues with mild dysarthria.     There is mild atherosclerotic narrowing of the proximal left ICA without significant stenosis seen on CTA. After reviewing the images there does not appear to be an acute indication for surgical intervention. Recommend aggressive medical management with dual antiplatelet therapy. If concerned for silent emboli recommend prolonged transcranial doppler.    Will continue to follow, thank you for the consultation

## 2018-01-06 NOTE — HPI
Duane Raines is a 62-year-old male with no significant PMHx.  [facility] and dysarthria.  CTH revealed no acute pathology.  A telemedicine consult was placed and completed.  tPA was recommended and administered. Patient was transferred to [facility] on 1/4 for further evaluation and management.  Upon arrival, CTH revealed no acute pathology.  CTA demonstrated no LVO, occlusion, or aneurysm.  Hospital course complicated by aphasia, dysarthria, hyperglycemia (HA1C 8.7).  VN following.      Functional History: Patient lives in [place] with his son in a mobile home with 5 steps to enter.  Prior to admission, (I) with ADLs and mobility.  Patient is R handed.  DME: none.

## 2018-01-06 NOTE — PLAN OF CARE
Problem: Patient Care Overview  Goal: Plan of Care Review  Recommendations    Recommend 2,000cal Diabetic, Cardiac diet     RD following    Goals: Meet % EEN/EPN   Nutrition Goal Status: new

## 2018-01-06 NOTE — PLAN OF CARE
Ana'd a call from Estrella with the stroke team stating this pt is uninsured and will need medication at time of d/c. Advised to e-scribe all the meds to [facility] and call when it has been done so I can call to get the price to pay for the meds. Advised her the pharmacy closes at 1600, this needs to be completed by 1500 to give me and the pharmacy time to complete.      1430- Spoke to Estrella and she stated she has e-scribed all the medications to the pharmacy. Advised I will get a price and pay for the medications this time. Advised her the pt will need to make other arrangements when it is time for him to refill in 30 days. Advised her he is responsible for establishing f/u care and applying for medicaid. She stated she would let him know.      1440- Spoke to Irena in [facility] she is going to call me back with the price for these meds. She stated I can fax the cost transfer form to [phone] and they will be able to have the meds delivered to his bedside. Awaiting for her to call me back with a price.     1533- Spoke to Irena in the [facility], she stated the total is $ 114.41, advised will fax the cost transfer form and she confirmed they will do bedside delivery.     1547- Spoke to Estrella with the stroke team, advised her the medications have been paid for and will be delivered to his bedside shortly.

## 2018-01-06 NOTE — CONSULTS
[facility]  Vascular Surgery  Consult Note    Inpatient consult to Vascular Surgery  Consult performed by: SHAUN POLANCO  Consult ordered by: ROSENDO CROWE  Assessment/Recommendations: Please see bottom of the note for recommendations        Subjective:     Chief Complaint/Reason for Admission: Stroke    History of Present Illness: Duane Raines is a 62 y.o. male who presented as a transfer from [facility] after TPA administration after having presented there with acute onset aphasia and dysarthria. Per son, and now per the patient, he was in mid conversation when all of a sudden he had slurred speech and aphasia. He has since improved significantly, however continues with dysarthric symptoms, he denies any aphasia at this time. He is otherwise asymptomatic. Denies any such previous symptoms. Reportedly does not see a doctor with any regularity.      No prescriptions prior to admission.       Review of patient's allergies indicates:  No Known Allergies    History reviewed. No pertinent past medical history.  History reviewed. No pertinent surgical history.  Family History     Problem Relation (Age of Onset)    Diabetes Mother        Social History Main Topics    Smoking status: Former Smoker    Smokeless tobacco: Never Used    Alcohol use No    Drug use: No    Sexual activity: Not on file     Review of Systems   Constitutional: Negative for chills and fever.   Respiratory: Negative for chest tightness and shortness of breath.    Gastrointestinal: Negative for abdominal pain, nausea and vomiting.   Genitourinary: Negative.    Neurological: Positive for speech difficulty. Negative for dizziness, syncope, facial asymmetry, weakness, light-headedness, numbness and headaches.   Psychiatric/Behavioral: Negative.      Objective:     Vital Signs (Most Recent):  Temp: 97.9 °F (36.6 °C) (01/06/18 0830)  Pulse: 77 (01/06/18 0830)  Resp: 18 (01/06/18 0830)  BP: (!) 161/74 (01/06/18  0830)  SpO2: 97 % (01/06/18 0830) Vital Signs (24h Range):  Temp:  [97.4 °F (36.3 °C)-98.5 °F (36.9 °C)] 97.9 °F (36.6 °C)  Pulse:  [67-96] 77  Resp:  [10-22] 18  SpO2:  [94 %-99 %] 97 %  BP: (127-181)/(70-86) 161/74     Weight: 86.5 kg (190 lb 11.2 oz)  Body mass index is 26.6 kg/m².    Physical Exam   Constitutional: He is oriented to person, place, and time. He appears well-developed and well-nourished. No distress.   HENT:   Head: Normocephalic and atraumatic.   Neck: No tracheal deviation present.   Cardiovascular: Normal rate.    Pulmonary/Chest: Effort normal. No respiratory distress.   Abdominal: Soft. He exhibits no distension.   Neurological: He is alert and oriented to person, place, and time.   Speech difficulties, dysarthric, no word finding difficulties, no motor or sensory deficits   Psychiatric: He has a normal mood and affect. His behavior is normal.       Significant Labs:  Amylase: No results for input(s): AMYLASE in the last 48 hours.  CBC:   Recent Labs  Lab 01/06/18  0419   WBC 7.86   RBC 4.74   HGB 15.5   HCT 44.9      MCV 95   MCH 32.7*   MCHC 34.5     CMP:   Recent Labs  Lab 01/06/18  0419   *   CALCIUM 10.0   ALBUMIN 3.4*   PROT 6.9      K 4.1   CO2 27      BUN 11   CREATININE 0.9   ALKPHOS 85   ALT 24   AST 14   BILITOT 0.8     Lactic Acid: No results for input(s): LACTATE in the last 48 hours.  Lipase: No results for input(s): LIPASE in the last 48 hours.    Significant Diagnostics:  I have reviewed all pertinent imaging results/findings within the past 24 hours.    Assessment/Plan:     * Embolic stroke involving left middle cerebral artery    Duane Raines is a 62 y.o. male with left sided multifocal small/punctate infarcts involving the left frontal, parietal, and occipital lobes with evidence of petechial hemorrhage involving the left frontal lobe, who continues with mild dysarthria.     There is mild atherosclerotic narrowing of the proximal left ICA without  significant stenosis seen on CTA. After reviewing the images there does not appear to be an acute indication for surgical intervention. Recommend aggressive medical management with dual antiplatelet therapy. If concerned for silent emboli recommend prolonged transcranial doppler.    Will continue to follow, thank you for the consultation            Thank you for your consult. I will follow-up with patient. Please contact us if you have any additional questions.    Silverio Paiz MD  Vascular Surgery  [facility]

## 2018-01-06 NOTE — ASSESSMENT & PLAN NOTE
"Agree with starting MDI regimen. Based on glucose response yesterday, the current dose appears to be close to his actual requirements.    Continue levemir 6 BID  Continue novolog 4 units with meals.  Change POC to ,hs,0200  Change to Low dose correction    Given history of "childhood diabetes" will check for JUSTION with ANASTASIA Ab.    Discharge planning (assuming ANASTASIA negative):  Medication choices will be limited by finances and lack of insurance.  Metformin 1000 mg BID (start at 500 mg daily and increase by one tablet weekly until up to 2 tablets BID)  Glipizide 5 mg daily  Will need diabetic teaching on blood glucose monitoring, meal planning, etc.  Will need glucometer, strips, lancet Rx.  "

## 2018-01-06 NOTE — CONSULTS
"  [facility]  Adult Nutrition  Consult Note    SUMMARY     Recommendations    Recommend 2,000cal Diabetic, Cardiac diet     RD following    Goals: Meet % EEN/EPN   Nutrition Goal Status: new  Communication of RD Recs: reviewed with RN       Reason for Assessment    Reason for Assessment: nurse/nurse practitioner consult  Diagnosis: stroke/CVA  Relevent Medical History: DM2      General Information Comments: Consult received to assess dietary needs. Pt eating 100% on diabetic diet.     Nutrition Discharge Planning: Diabetic diet    Nutrition Prescription Ordered    Current Diet Order: 2000cal Diabetic     Evaluation of Received Nutrients/Fluid Intake    I/O: +I/O; good uop  Comments: LBM 1/4     % Intake of Estimated Energy Needs: 75 - 100 %  % Meal Intake: 100%     Nutrition Risk Screen     Nutrition Risk Screen: no indicators present    Nutrition/Diet History    Patient Reported Diet/Restrictions/Preferences: diabetic diet  Typical Food/Fluid Intake: Good intake  Food Preferences: No Latter day/ cultural prefs noted      Labs/Tests/Procedures/Meds    Pertinent Labs Reviewed: reviewed, pertinent  Pertinent Labs Comments: Chol 315, Trig 765, POCT Gluc 140-331, a1c 8.7  Pertinent Medications Reviewed: reviewed, pertinent  Pertinent Medications Comments: statin, heparin, insulin, senna    Physical Findings    Overall Physical Appearance: overweight  Skin: intact    Anthropometrics    Temp: 98.2 °F (36.8 °C)     Height: 5' 11" (180.3 cm)  Weight Method: Stated  Weight: 86.5 kg (190 lb 11.2 oz)  Ideal Body Weight (IBW), Male: 172 lb     % Ideal Body Weight, Male (lb): 110.87 lb     BMI (Calculated): 26.7  BMI Grade: 25 - 29.9 - overweight       Estimated/Assessed Needs    Weight Used For Calorie Calculations: 86.5 kg (190 lb 11.2 oz)   Height (cm): 180.3 cm  Energy Calorie Requirements (kcal): 2108  Energy Need Method: Spalding-St Jeor (PAL 1.25)  RMR (Spalding-St. Jeor Equation): 1687.13  Weight " Used For Protein Calculations: 86.5 kg (190 lb 11.2 oz)  Fluid Requirements (mL): or per MD  Fluid Need Method: RDA Method  RDA Method (mL): 2108  CHO Requirement: 45-50%     Assessment and Plan    Poorly controlled type 2 diabetes mellitus    Nutrition Problem  Altered nutrition related lab values    Related to (etiology):   DM2    Signs and Symptoms (as evidenced by):   POCT 140-331, a1c 8.7, poor diet compliance     Interventions/Recommendations (treatment strategy):  Diabetic diet initiated     Nutrition Diagnosis Status:   New                  Monitor and Evaluation    Food and Nutrient Intake: energy intake, food and beverage intake  Food and Nutrient Adminstration: diet order        Anthropometric Measurements: weight, weight change, body mass index  Biochemical Data, Medical Tests and Procedures: electrolyte and renal panel, lipid profile, gastrointestinal profile, glucose/endocrine profile, inflammatory profile  Nutrition-Focused Physical Findings: overall appearance    Nutrition Risk    Level of Risk:  (f/u 1x week)    Nutrition Follow-Up    RD Follow-up?: Yes

## 2018-01-06 NOTE — CONSULTS
[facility]  Physical Medicine & Rehab  Consult Note    Patient Name: Duane Raines  MRN: [id number]  Admission Date: 1/4/2018  Hospital Length of Stay: 2 days  Attending Physician: Terrance Peace MD     Inpatient consult to Physical Medicine & Rehabilitation  Consult performed by: Payton Merritt NP  Consult requested by:  Terrance Peace MD    Reason for Consult:  assess rehabilitation needs    Consults  Subjective:     Principal Problem: Embolic stroke involving left middle cerebral artery    HPI: Duane Raines is a 62-year-old male with no significant PMHx.  Patient presented to [facility] with aphasia and dysarthria.  CTH revealed no acute pathology.  A telemedicine consult was placed and completed.  tPA was recommended and administered. Patient was transferred to [facility] on 1/4 for further evaluation and management.  Upon arrival, CTH revealed no acute pathology.  CTA demonstrated no LVO, occlusion, or aneurysm.  Hospital course complicated by aphasia, dysarthria, hyperglycemia (HA1C 8.7).  VN following.      Functional History: Patient lives in [place] with his son in a mobile home with 5 steps to enter.  Prior to admission, (I) with ADLs and mobility.  Patient is R handed.  DME: none.    Hospital Course: 1/5/18: OT evaluation pending.  Evaluated by PT.  Bed mobility (I) .  Sit to stand (I) and transfers SBA/SV.  Ambulated 20ft SV with tandem gait performed x 5 ft.  SLP noted aphasia, dysphagia, and dysarthria.      History reviewed. No pertinent past medical history.  History reviewed. No pertinent surgical history.  Review of patient's allergies indicates:  No Known Allergies    Scheduled Medications:    aspirin  325 mg Oral Daily    atorvastatin  80 mg Oral Daily    [START ON 1/7/2018] clopidogrel  75 mg Oral Daily    heparin (porcine)  5,000 Units Subcutaneous Q8H    insulin aspart  4 Units Subcutaneous TIDWM    insulin detemir  6 Units Subcutaneous BID    lisinopril  10 mg Oral  Daily    senna-docusate 8.6-50 mg  1 tablet Oral BID    sodium chloride 0.9%  3 mL Intravenous Q8H       PRN Medications: acetaminophen, dextrose 50%, dextrose 50%, glucagon (human recombinant), glucose, glucose, insulin aspart, ondansetron    Family History     Problem Relation (Age of Onset)    Diabetes Mother        Social History Main Topics    Smoking status: Former Smoker    Smokeless tobacco: Never Used    Alcohol use No    Drug use: No    Sexual activity: Not on file     Review of Systems   Constitutional: Negative for chills, fatigue and fever.   HENT: Negative for trouble swallowing and voice change.    Eyes: Negative for photophobia and visual disturbance.   Respiratory: Negative for cough, shortness of breath and wheezing.    Cardiovascular: Negative for chest pain and palpitations.   Gastrointestinal: Negative for abdominal distention, nausea and vomiting.   Genitourinary: Negative for difficulty urinating and flank pain.   Musculoskeletal: Negative for arthralgias and gait problem.   Skin: Negative for color change and rash.   Neurological: Positive for speech difficulty and numbness (bilateral feet). Negative for weakness and headaches.   Psychiatric/Behavioral: Positive for confusion. Negative for agitation.     Objective:     Vital Signs (Most Recent):  Temp: 97.9 °F (36.6 °C) (01/06/18 0830)  Pulse: 77 (01/06/18 0830)  Resp: 18 (01/06/18 0830)  BP: (!) 161/74 (01/06/18 0830)  SpO2: 97 % (01/06/18 0830)    Vital Signs (24h Range):  Temp:  [97.4 °F (36.3 °C)-98.5 °F (36.9 °C)] 97.9 °F (36.6 °C)  Pulse:  [67-96] 77  Resp:  [10-22] 18  SpO2:  [94 %-99 %] 97 %  BP: (127-173)/(70-85) 161/74     Body mass index is 26.6 kg/m².    Physical Exam   Constitutional: He appears well-developed and well-nourished.   HENT:   Head: Normocephalic and atraumatic.   Eyes: EOM are normal. Pupils are equal, round, and reactive to light.   Neck: Normal range of motion.   Cardiovascular: Normal rate and regular  rhythm.    Pulmonary/Chest: Effort normal. No respiratory distress.   Abdominal: Soft. There is no tenderness.   Musculoskeletal: Normal range of motion. He exhibits no deformity.   Neurological:   -  Mental Status:  AAOx3.  Follows commands.  Answers correct age and .  Recent and remote memory intact.    -  Speech and language:  + aphasia + dysarthria.    -  Motor:  RUE: 5/5, 5/5 .  LUE: 5/5, 5/5 .  RLE: 5/5, DF 5/5, PF 5/5.  LLE: 5/5, DF 5/5, PF 5/5.   -  Tone:  normal  -  Sensory:  Decreased sensation to bilateral feet to light touch and pin prick.  Skin: Skin is warm and dry.   Psychiatric: He has a normal mood and affect. Cognition and memory are impaired.            Diagnostic Results:   Labs: Reviewed  ECG: Reviewed  X-Ray: Reviewed  CT: Reviewed  MRI: Reviewed    Assessment/Plan:     * Embolic stroke involving left middle cerebral artery    Functional status: see hospital course  Cognitive/Speech/Language status:  Mild aphasia, mild dysarthria, and mild dysphagia.   Nutrition/Swallow Status:  Passed bedside swallow evaluation.  SLP recommended regular diet and thin liquids.    Recommendations  -  Encourage mobility, OOB in chair at least 3 hours per day, and early ambulation as appropriate   -  PT/OT evaluate and treat  -  SLP speech and cognitive evaluate and treat  -  Monitor sleep disturbances and establish consistent sleep-wake cycle  -  Monitor for bowel and bladder dysfunction  -  Monitor for shoulder pain and subluxation  -  Monitor for spasticity  -  Monitor for and prevent skin breakdown and pressure ulcers  · Early mobility, repositioning/weight shifting every 20-30 minutes when sitting, turn patient every 2 hours, proper mattress/overlay and chair cushioning, pressure relief/heel protector boots  -  DVT prophylaxis:  [facility]  -  Reviewed discharge options (IP rehab, SNF, HH therapy, and OP therapy)          Poorly controlled type 2 diabetes mellitus    -Hem A1c 8.7        Aphasia    -2/2  stroke        Dysarthria    -2/2 stroke        Tissue plasminogen activator (t-PA) administered at other facility within 24 hours prior to current admission    -administered 2/2 stroke symptoms         Patient is high level with PT and can ambulate 20 ft with SV.  OT evaluation pending.  Patient does have SLP goals for dysphagia, dysarthria, and aphasia. Recommend outpatient SLP therapy.  Will follow and discuss with rehab team for rehab recommendation.      Thank you for your consult.     Payton Merritt NP  Department of Physical Medicine & Rehab  [facility]

## 2018-01-06 NOTE — ASSESSMENT & PLAN NOTE
Nutrition Problem  Altered nutrition related lab values    Related to (etiology):   DM2    Signs and Symptoms (as evidenced by):   POCT 140-331, a1c 8.7, poor diet compliance     Interventions/Recommendations (treatment strategy):  See recs     Nutrition Diagnosis Status:   New

## 2018-01-06 NOTE — SUBJECTIVE & OBJECTIVE
History reviewed. No pertinent past medical history.  History reviewed. No pertinent surgical history.  Review of patient's allergies indicates:  No Known Allergies    Scheduled Medications:    aspirin  325 mg Oral Daily    atorvastatin  80 mg Oral Daily    [START ON 1/7/2018] clopidogrel  75 mg Oral Daily    heparin (porcine)  5,000 Units Subcutaneous Q8H    insulin aspart  4 Units Subcutaneous TIDWM    insulin detemir  6 Units Subcutaneous BID    lisinopril  10 mg Oral Daily    senna-docusate 8.6-50 mg  1 tablet Oral BID    sodium chloride 0.9%  3 mL Intravenous Q8H       PRN Medications: acetaminophen, dextrose 50%, dextrose 50%, glucagon (human recombinant), glucose, glucose, insulin aspart, ondansetron    Family History     Problem Relation (Age of Onset)    Diabetes Mother        Social History Main Topics    Smoking status: Former Smoker    Smokeless tobacco: Never Used    Alcohol use No    Drug use: No    Sexual activity: Not on file     Review of Systems   Constitutional: Negative for chills, fatigue and fever.   HENT: Negative for trouble swallowing and voice change.    Eyes: Negative for photophobia and visual disturbance.   Respiratory: Negative for cough, shortness of breath and wheezing.    Cardiovascular: Negative for chest pain and palpitations.   Gastrointestinal: Negative for abdominal distention, nausea and vomiting.   Genitourinary: Negative for difficulty urinating and flank pain.   Musculoskeletal: Negative for arthralgias and gait problem.   Skin: Negative for color change and rash.   Neurological: Positive for speech difficulty and numbness (bilateral feet). Negative for weakness and headaches.   Psychiatric/Behavioral: Positive for confusion. Negative for agitation.     Objective:     Vital Signs (Most Recent):  Temp: 97.9 °F (36.6 °C) (01/06/18 0830)  Pulse: 77 (01/06/18 0830)  Resp: 18 (01/06/18 0830)  BP: (!) 161/74 (01/06/18 0830)  SpO2: 97 % (01/06/18 0830)    Vital Signs  (24h Range):  Temp:  [97.4 °F (36.3 °C)-98.5 °F (36.9 °C)] 97.9 °F (36.6 °C)  Pulse:  [67-96] 77  Resp:  [10-22] 18  SpO2:  [94 %-99 %] 97 %  BP: (127-173)/(70-85) 161/74     Body mass index is 26.6 kg/m².    Physical Exam   Constitutional: He appears well-developed and well-nourished.   HENT:   Head: Normocephalic and atraumatic.   Eyes: EOM are normal. Pupils are equal, round, and reactive to light.   Neck: Normal range of motion.   Cardiovascular: Normal rate and regular rhythm.    Pulmonary/Chest: Effort normal. No respiratory distress.   Abdominal: Soft. There is no tenderness.   Musculoskeletal: Normal range of motion. He exhibits no deformity.   Neurological:   -  Mental Status:  AAOx3.  Follows commands.  Answers correct age and .  Recent and remote memory intact.    -  Speech and language:  + aphasia + dysarthria.    -  Motor:  RUE: 5/5, 5/5 .  LUE: 5/5, 5/5 .  RLE: 5/5, DF 5/5, PF 5/5.  LLE: 5/5, DF 5/5, PF 5/5.   -  Tone:  normal  -  Sensory:  Decreased sensation to bilateral feet to light touch and pin prick.       Skin: Skin is warm and dry.   Psychiatric: He has a normal mood and affect. Cognition and memory are impaired.     NEUROLOGICAL EXAMINATION:     CRANIAL NERVES     CN III, IV, VI   Pupils are equal, round, and reactive to light.  Extraocular motions are normal.       Diagnostic Results:   Labs: Reviewed  ECG: Reviewed  X-Ray: Reviewed  CT: Reviewed  MRI: Reviewed

## 2018-01-06 NOTE — PLAN OF CARE
01/06/2018      Duane Raines  [address] [place] [address]          [facility] Medicine Dept.  [facility] [address] [place] [address]  [phone] after hours  [phone] fax Diagnosis:  Embolic stroke involving left middle cerebral artery                         Debility      Patient needs outpatient speech therapy.  Please evaluate and treat.         __________________________  Estrella Loyd PA-C  01/06/2018

## 2018-01-06 NOTE — PROCEDURES
**THIS IS A PRELIMINARY REPORT**  EMBOLI DETECTION WITHOUT INTRAVENOUS INJECTION      Findings:  After monitoring bilateral M1 MCA's for approximately twenty minutes, no microembolic signals were detected.  Negative Emboli Detection Study.      Please correlate clinically.    See final report for detailed and verified results.    Brittany Eason, RVS  TCD Sonographer

## 2018-01-07 NOTE — PROGRESS NOTES
[facility]  Vascular Neurology  Comprehensive Stroke Center  Progress Note    Assessment/Plan:     * Embolic stroke involving left middle cerebral artery    62 year old male with medical history of prior tobacco abuse was transferred from [facility] after receiving IV-tPA for expressive aphasia, dysarthria and LUQ hemianopsia.  No large vessel occlusion seen.        Antithrombotics for secondary stroke prevention: ASA 81mg qd for life and plavix 75mg qd (3 months)    Statins for secondary stroke prevention and hyperlipidemia, if present: Chol 373, Atorvastatin 80 mg    Aggressive risk factor modification: none     Rehab efforts: outpatient     Diagnostics ordered/pending: none   VTE prophylaxis: None: Reason for No Pharmacological VTE Prophylaxis: Mechanical prophylaxis: Place SCDs    BP parameters:SBP less than 160    Follow up: no insurance, applying for medicaid will follow up next week with [facility] or [facility].              Essential hypertension    Risk factor for stroke  Started lisinopril 10mg qd         Mixed hyperlipidemia    Started on atorvastatin 80mg qhs         Carotid stenosis, left    Vascular surgery consulted for possible ulcerated R ICA plaque.  No intervention.  TCD with emboli detection negative.  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  01/05/18 Vascular surgery consulted for possible ulcerated L ICA plaque.  No acute  intervention.  TCD with emboli detection negative.  Endocrinology following and started metformin and glipizide.       STROKE DOCUMENTATION   Acute Stroke Times   Last Known Normal Date: 01/04/18  Last Known Normal Time: 1700  Symptom Onset Date: 01/04/18  Symptom Onset Time: 1700  Stroke Team Called Date: 01/04/18  Stroke Team Called Time: 1717  Stroke Team Arrival Date: 01/04/18  Stroke Team Arrival Time: 0517  CT Interpretation Time: 1727  Decision to Treat Time for Alteplase: 1727 (Decision to treat 1727 initial bolus given 1746 )  Decision to Treat Time for IR:  (no LVO )    NIH Scale:  1a. Level Of Consciousness: 0-->Alert: keenly responsive  1b. LOC Questions: 0-->Answers both questions correctly  1c. LOC Commands: 0-->Performs both tasks correctly  2. Best Gaze: 0-->Normal  3. Visual: 0-->No visual loss  4. Facial Palsy: 0-->Normal symmetrical movements  5a. Motor Arm, Left: 0-->No drift: limb holds 90 (or 45) degrees for full 10 secs  5b. Motor Arm, Right: 0-->No drift: limb holds 90 (or 45) degrees for full 10 secs  6a. Motor Leg, Left: 0-->No drift: leg holds 30 degree position for full 5 secs  6b. Motor Leg, Right: 0-->No drift: leg holds 30 degree position for full 5 secs  7. Limb Ataxia: 0-->Absent  8. Sensory: 0-->Normal: no sensory loss  9. Best Language: 0-->No aphasia: normal  10. Dysarthria: 1-->Mild-to-moderate dysarthria: patient slurs at least some words and, at worst, can be understood with some difficulty  11. Extinction and Inattention (formerly Neglect): 0-->No abnormality  Total (NIH Stroke Scale): 1       Modified [place] Score: 0  Vincent Coma Scale:15   ABCD2 Score:    [id number] Score:   HAS -BLED Score:   ICH Score:   Hunt & Phan Classification:      Hemorrhagic change of an Ischemic Stroke: Does this patient have an ischemic stroke with hemorrhagic changes?no    Neurologic Chief Complaint: dysarthria     Subjective:     Interval History: Patient is seen for follow-up neurological  assessment and treatment recommendations: Vascular surgery consulted for possible ulcerated R ICA plaque.  No acute intervention.  TCD with emboli detection negative.  Endocrinology following and started metformin and glipizide    Pt reports that dysarthria is resolving. No new complaints.     HPI, Past Medical, Family, and Social History remains the same as documented in the initial encounter.     Review of Systems   Constitutional: Negative for chills and fever.   Eyes: Negative for photophobia and visual disturbance.   Respiratory: Negative for cough and shortness of breath.    Cardiovascular: Negative for chest pain and palpitations.     Scheduled Meds:   aspirin  325 mg Oral Daily    atorvastatin  80 mg Oral Daily    [START ON 1/7/2018] clopidogrel  75 mg Oral Daily    heparin (porcine)  5,000 Units Subcutaneous Q8H    insulin aspart  4 Units Subcutaneous TIDWM    insulin detemir  6 Units Subcutaneous BID    lisinopril  10 mg Oral Daily    metFORMIN  500 mg Oral BID WM    senna-docusate 8.6-50 mg  1 tablet Oral BID    sodium chloride 0.9%  3 mL Intravenous Q8H     Continuous Infusions:    PRN Meds:acetaminophen, dextrose 50%, dextrose 50%, glucagon (human recombinant), glucose, glucose, insulin aspart, ondansetron    Objective:     Vital Signs (Most Recent):  Temp: 98.5 °F (36.9 °C) (01/06/18 1619)  Pulse: 92 (01/06/18 1619)  Resp: 18 (01/06/18 1619)  BP: (!) 140/84 (01/06/18 1619)  SpO2: 95 % (01/06/18 1619)  BP Location: Left arm    Vital Signs Range (Last 24H):  Temp:  [97.4 °F (36.3 °C)-98.5 °F (36.9 °C)]   Pulse:  [67-92]   Resp:  [12-20]   BP: (127-161)/(70-84)   SpO2:  [94 %-99 %]   BP Location: Left arm    Physical Exam   Constitutional: He appears well-developed and well-nourished.   HENT:   Head: Normocephalic and atraumatic.   Eyes: EOM are normal. Pupils are equal, round, and reactive to light.   Pulmonary/Chest: Effort normal. No respiratory distress.       Neurological Exam:    Orientation: alert and oriented to person, place, time and situation, memory intact  Language: good fluency, no aphasia, mild dysarthria  Attention: good  LUE 5/5  LLE 5/5  RUE 5/5  RLE 5/5  Normal tone     Laboratory:  CMP:     Recent Labs  Lab 01/06/18  0419   CALCIUM 10.0   ALBUMIN 3.4*   PROT 6.9      K 4.1   CO2 27      BUN 11   CREATININE 0.9   ALKPHOS 85   ALT 24   AST 14   BILITOT 0.8     BMP:     Recent Labs  Lab 01/06/18  0419      K 4.1      CO2 27   BUN 11   CREATININE 0.9   CALCIUM 10.0     CBC:     Recent Labs  Lab 01/06/18 0419   WBC 7.86   RBC 4.74   HGB 15.5   HCT 44.9      MCV 95   MCH 32.7*   MCHC 34.5     Lipid Panel:     Recent Labs  Lab 01/06/18 0419   CHOL 315*   LDLCALC Invalid, Trig>400.0   HDL 35*   TRIG 765*     Coagulation:     Recent Labs  Lab 01/04/18  1722   INR 0.9   APTT 27.6     Platelet Aggregation Study: No results for input(s): PLTAGG, PLTAGINTERP, PLTAGREGLACO, ADPPLTAGGREG in the last 168 hours.  Hgb A1C:     Recent Labs  Lab 01/04/18 2111   HGBA1C 8.7*     TSH:     Recent Labs  Lab 01/04/18 2111   TSH 2.131       Diagnostic Results     Brain Imaging   Vessel Imaging   MRI Brain 01/05/18  -restricted diffusion in the lef frontal, parietal, occipital lobe   -Cytotoxic cerebral edema     CTA head/ neck 01/04/18  Diffuse narrowing of blood vessels  Possible ulcerated plaque at L ICA    TCD with emboli detection 01/06/18  Negative for carotid emboli     Cardiac Imaging   TTE: per report   CONCLUSIONS  No LAE    (EF 55-60%).         Estrella Loyd PA-C  Comprehensive Stroke Center  Department of Vascular Neurology   [facility]

## 2018-01-07 NOTE — SUBJECTIVE & OBJECTIVE
Neurologic Chief Complaint: dysarthria     Subjective:     Interval History: Patient is seen for follow-up neurological assessment and treatment recommendations: Vascular surgery consulted for possible ulcerated R ICA plaque.  No acute intervention.  TCD with emboli detection negative.  Endocrinology following and started metformin and glipizide    Pt reports that dysarthria is resolving. No new complaints.     HPI, Past Medical, Family, and Social History remains the same as documented in the initial encounter.     Review of Systems   Constitutional: Negative for chills and fever.   Eyes: Negative for photophobia and visual disturbance.   Respiratory: Negative for cough and shortness of breath.    Cardiovascular: Negative for chest pain and palpitations.     Scheduled Meds:   aspirin  325 mg Oral Daily    atorvastatin  80 mg Oral Daily    [START ON 1/7/2018] clopidogrel  75 mg Oral Daily    heparin (porcine)  5,000 Units Subcutaneous Q8H    insulin aspart  4 Units Subcutaneous TIDWM    insulin detemir  6 Units Subcutaneous BID    lisinopril  10 mg Oral Daily    metFORMIN  500 mg Oral BID WM    senna-docusate 8.6-50 mg  1 tablet Oral BID    sodium chloride 0.9%  3 mL Intravenous Q8H     Continuous Infusions:    PRN Meds:acetaminophen, dextrose 50%, dextrose 50%, glucagon (human recombinant), glucose, glucose, insulin aspart, ondansetron    Objective:     Vital Signs (Most Recent):  Temp: 98.5 °F (36.9 °C) (01/06/18 1619)  Pulse: 92 (01/06/18 1619)  Resp: 18 (01/06/18 1619)  BP: (!) 140/84 (01/06/18 1619)  SpO2: 95 % (01/06/18 1619)  BP Location: Left arm    Vital Signs Range (Last 24H):  Temp:  [97.4 °F (36.3 °C)-98.5 °F (36.9 °C)]   Pulse:  [67-92]   Resp:  [12-20]   BP: (127-161)/(70-84)   SpO2:  [94 %-99 %]   BP Location: Left arm    Physical Exam   Constitutional: He appears well-developed and well-nourished.   HENT:   Head: Normocephalic and atraumatic.   Eyes: EOM are normal. Pupils are equal, round,  and reactive to light.   Pulmonary/Chest: Effort normal. No respiratory distress.       Neurological Exam:   Orientation: alert and oriented to person, place, time and situation, memory intact  Language: good fluency, no aphasia, mild dysarthria  Attention: good  LUE 5/5  LLE 5/5  RUE 5/5  RLE 5/5  Normal tone     Laboratory:  CMP:     Recent Labs  Lab 01/06/18  0419   CALCIUM 10.0   ALBUMIN 3.4*   PROT 6.9      K 4.1   CO2 27      BUN 11   CREATININE 0.9   ALKPHOS 85   ALT 24   AST 14   BILITOT 0.8     BMP:     Recent Labs  Lab 01/06/18 0419      K 4.1      CO2 27   BUN 11   CREATININE 0.9   CALCIUM 10.0     CBC:     Recent Labs  Lab 01/06/18 0419   WBC 7.86   RBC 4.74   HGB 15.5   HCT 44.9      MCV 95   MCH 32.7*   MCHC 34.5     Lipid Panel:     Recent Labs  Lab 01/06/18 0419   CHOL 315*   LDLCALC Invalid, Trig>400.0   HDL 35*   TRIG 765*     Coagulation:     Recent Labs  Lab 01/04/18  1722   INR 0.9   APTT 27.6     Platelet Aggregation Study: No results for input(s): PLTAGG, PLTAGINTERP, PLTAGREGLACO, ADPPLTAGGREG in the last 168 hours.  Hgb A1C:     Recent Labs  Lab 01/04/18 2111   HGBA1C 8.7*     TSH:     Recent Labs  Lab 01/04/18 2111   TSH 2.131       Diagnostic Results     Brain Imaging   CT head:  FINDINGS:    Mild age-appropriate involutional changes are noted.  There is intracranial atherosclerosis.  Minimal periventricular white matter chronic micro-ischemic changes are noted.  No evidence of acute intracranial hemorrhage, mass effect, midline deviation, hydrocephalus, or abnormal extra-axial fluid collection is seen. No evidence of acute large vessel territory ischemia is seen. The basilar cisterns are preserved. The visualized paranasal sinuses are clear. The mastoid air cells are grossly clear. No acute displaced calvarial abnormality is appreciated.   Impression       1. No acute intracranial process is identified.    Findings were discussed with Dr. Terrell in the  emergency department at 1727 hrs. on 1/4/2018.      Electronically signed by: ADRIANO JERRY MD  Date: 01/04/18  Time: 17:27     Encounter     View Encounter             Vessel Imaging   MRI Brain 01/05/18  -restricted diffusion in the lef frontal, parietal, occipital lobe   -Cytotoxic cerebral edema     CTA head/ neck 01/04/18  Diffuse narrowing of blood vessels  Possible ulcerated plaque at L ICA        Cardiac Imaging   TTE: per report   CONCLUSIONS  No LAE    (EF 55-60%).

## 2018-01-07 NOTE — ASSESSMENT & PLAN NOTE
Vascular surgery consulted for possible ulcerated R ICA plaque.  No intervention.  TCD with emboli detection negative.  On asa, plavix and atorvastatin

## 2018-01-07 NOTE — ASSESSMENT & PLAN NOTE
62 year old male with medical history of prior tobacco abuse was transferred from [facility] after receiving IV-tPA for expressive aphasia, dysarthria and LUQ hemianopsia.  No large vessel occlusion seen.        Antithrombotics for secondary stroke prevention: ASA 81mg qd for life and plavix 75mg qd (3 months)    Statins for secondary stroke prevention and hyperlipidemia, if present: Chol 373, Atorvastatin 80 mg    Aggressive risk factor modification: none     Rehab efforts: outpatient     Diagnostics ordered/pending: none   VTE prophylaxis: None: Reason for No Pharmacological VTE Prophylaxis: Mechanical prophylaxis: Place SCDs    BP parameters:SBP less than 160    Follow up: no insurance, applying for medicaid will follow up next week with [facility].

## 2018-01-07 NOTE — PLAN OF CARE
Problem: Patient Care Overview  Goal: Plan of Care Review  Poc reviewed with pt. And son including glycemic management, s/s of stroke with modifiable risk factors, and medication education.  Pt. And son verbalized understanding.  Pt. Up to bathroom safely with stand by assist.

## 2018-01-07 NOTE — NURSING
Pt. D/c to home with son and significant other. Pt. AA&Ox4, vss, and all d/c instructions given and explained to pt. Including gycemic management, stroke s/s with modifiable risk factors, follow up appt.'s, and medication education.  Pt. And family verbalized understanding.  IV lines removed with catheter intact.

## 2018-01-08 NOTE — ASSESSMENT & PLAN NOTE
62 year old male with medical history of prior tobacco abuse was transferred from [facility] after receiving IV-tPA for expressive aphasia, dysarthria and LUQ hemianopsia.  No large vessel occlusion seen.        Antithrombotics for secondary stroke prevention: ASA 81mg qd for life and plavix 75mg qd (3 months)    Statins for secondary stroke prevention and hyperlipidemia, if present: Chol 373, Atorvastatin 80 mg    Aggressive risk factor modification: none     Rehab efforts: outpatient     Diagnostics ordered/pending: none   VTE prophylaxis: None: Reason for No Pharmacological VTE Prophylaxis: Mechanical prophylaxis: Place SCDs    BP parameters:SBP less than 160    Follow up: no insurance, applying for medicaid will follow up next week with [facility].

## 2018-01-08 NOTE — DISCHARGE SUMMARY
[facility]  Vascular Neurology  Comprehensive Stroke Center  Discharge Summary     Summary:     Admit Date: 1/4/2018  7:05 PM    Discharge Date and Time:  01/07/2018 7:50 PM    Attending Physician: No att. providers found     Discharge Provider: Estrella Loyd PA-C    History of Present Illness:  62 year old male with medical history of prior tobacco abuse, HTN, DM (untreated) was transferred from [facility] after receiving IV-tPA for speech difficulty.  Son said he started coughing and noticed slurred speech.  He is having difficulty finding some of his words.  Symptoms are starting to improve.  Patient was hypertensive and required one dose of IV labetalol in CT scanner.  CTA MP done and no large vessel occlusion seen.  He is having trouble with his vision in left eye but denies gait instability, numbness or weakness.          Hospital Course (synopsis of major diagnoses, care, treatment, and services provided during the course of the hospital stay):  62 year old male with medical history of prior tobacco abuse, HTN, DM (untreated) was transferred from [facility] after receiving IV-tPA for dysarthria.  Found to have multiple embolic strokes in left hemisphere.  CTA showed L ICA plaque and was evaluated by vascular surgery.  TCD with emboli detection negative.  No intervention necessary.  Echo within normal limit.  Etiology cardioembolic versus cardioembolic.  30 day event monitor ordered.  ASA 81mg qd once a day, plavix 75mg qd for three months and atorvastatin 40mg qhs was prescribed for stroke prevention.  Newly diagnosed diabetic.  Endocrinology consutled and started metformin and glipizide.  Diabetes education given.  Patient started on lisinopril for hypertension.  He will follow up at [facility] or [facility] next week and stroke within 4 weeks.  Therapies evaluated and recommonded outpatient ST.    STROKE DOCUMENTATION   Acute Stroke Times   Last Known  Normal Date: 01/04/18  Last Known Normal Time: 1700  Symptom Onset Date: 01/04/18  Symptom Onset Time: 1700  Stroke Team Called Date: 01/04/18  Stroke Team Called Time: 1717  Stroke Team Arrival Date: 01/04/18  Stroke Team Arrival Time: 0517  CT Interpretation Time: 1727  Decision to Treat Time for Alteplase: 1727 (Decision to treat 1727 initial bolus given 1746 )  Decision to Treat Time for IR:  (no LVO )     NIH Scale:  1a. Level Of Consciousness: 0-->Alert: keenly responsive  1b. LOC Questions: 0-->Answers both questions correctly  1c. LOC Commands: 0-->Performs both tasks correctly  2. Best Gaze: 0-->Normal  3. Visual: 0-->No visual loss  4. Facial Palsy: 0-->Normal symmetrical movements  5a. Motor Arm, Left: 0-->No drift: limb holds 90 (or 45) degrees for full 10 secs  5b. Motor Arm, Right: 0-->No drift: limb holds 90 (or 45) degrees for full 10 secs  6a. Motor Leg, Left: 0-->No drift: leg holds 30 degree position for full 5 secs  6b. Motor Leg, Right: 0-->No drift: leg holds 30 degree position for full 5 secs  7. Limb Ataxia: 0-->Absent  8. Sensory: 0-->Normal: no sensory loss  9. Best Language: 0-->No aphasia: normal  10. Dysarthria: 1-->Mild-to-moderate dysarthria: patient slurs at least some words and, at worst, can be understood with some difficulty  11. Extinction and Inattention (formerly Neglect): 0-->No abnormality  Total (NIH Stroke Scale): 1        Modified [place] Score: 0  Vincent Coma Scale:    ABCD2 Score:    [id number] Score:   HAS -BLED Score:   ICH Score:   Hunt & Phan Classification:       Assessment/Plan:     Diagnostic Results:  MRI Brain 01/05/18  -restricted diffusion in the lef frontal, parietal, occipital lobe   -Cytotoxic cerebral edema      CTA head/ neck 01/04/18  Diffuse narrowing of blood vessels  Possible ulcerated plaque at L ICA     TCD with emboli detection 01/06/18  Negative for carotid emboli      Cardiac Imaging   TTE: per report   CONCLUSIONS  No LAE    (EF 55-60%).        Interventions: IV t-PA    Complications: None    Disposition: Home or Self Care    Final Active Diagnoses:    Diagnosis Date Noted POA    PRINCIPAL PROBLEM:  Embolic stroke involving left middle cerebral artery [I63.412] 01/04/2018 Yes    Hypertriglyceridemia [E78.1] 01/06/2018 Unknown    Embolic stroke involving left posterior cerebral artery [I63.432] 01/06/2018 Unknown    Essential hypertension [I10] 01/06/2018 Unknown    Cytotoxic cerebral edema [G93.6] 01/05/2018 Yes    Carotid stenosis, left [I65.22] 01/05/2018 Yes    Mixed hyperlipidemia [E78.2] 01/05/2018 Yes    Tissue plasminogen activator (t-PA) administered at other facility within 24 hours prior to current admission [Z92.82] 01/04/2018 Not Applicable    Dysarthria [R47.1] 01/04/2018 Unknown    Aphasia [R47.01] 01/04/2018 Yes    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  No large vessel occlusion seen.        Antithrombotics for secondary stroke prevention: ASA 81mg qd for life and plavix 75mg qd (3 months)    Statins for secondary stroke prevention and hyperlipidemia, if present: Chol 373, Atorvastatin 80 mg    Aggressive risk factor modification: none     Rehab efforts:ST outpatient     Diagnostics ordered/pending: none   VTE prophylaxis: None: Reason for No Pharmacological VTE Prophylaxis: Mechanical prophylaxis: Place SCDs    BP parameters:SBP less than 160    Follow up: no insurance, applying for medicaid will follow [facility] next week with [facility].              Essential hypertension     Risk factor for stroke  Started lisinopril 10mg qd         Embolic stroke involving left posterior cerebral artery    See above             Mixed hyperlipidemia    Started on atorvastatin 80mg qhs         Carotid stenosis, left    Vascular surgery consulted for possible ulcerated R ICA plaque.  No intervention.  TCD with emboli detection negative.  On asa, plavix and atorvastatin         Cytotoxic cerebral edema    Evident on images          Aphasia    improved        Dysarthria    Due to stroke   outpatient         Tissue plasminogen activator (t-PA) administered at other facility within 24 hours prior to current admission    [facility] at [facility]  Admit to [facility] for 24 hours             Recommendations:     Post-discharge complication risks: None    Stroke Education given to: patient and family    Follow-up in Stroke Clinic in 4 weeks     Discharge Plan:  Antithrombotics: Aspirin 81mg, Clopidogrel 75mg x 3months   Statin: Atorvastatin 80mg qhs  Aggresive risk factor modification:  Hypertension  Diabetes  High Cholesterol  Diet  Exercise    Follow Up:  Follow-up Information     Please follow up.    Why:  follow up with primary care clinic within 1 week.  [facility] VASCULAR NEUROLOGY.    Specialty:  Vascular Neurology  Why:  follow up 4 weeks   Contact information:  [address] [place] [address] [phone]                 Patient Instructions:     Diet diabetic         Medications:  Reconciled Home Medications:   Discharge Medication List as of 1/6/2018  5:17 PM      START taking these medications    Details   atorvastatin (LIPITOR) 80 MG tablet Take 1 tablet (80 mg total) by mouth once daily., Starting Sun 1/7/2018, Until Mon 1/7/2019, Normal      blood sugar diagnostic Strp 2 strips by Misc.(Non-Drug; Combo Route) route once., Starting Sat 1/6/2018, Normal      blood-glucose meter (FREESTYLE SYSTEM KIT) kit Use as instructed, Normal       clopidogrel (PLAVIX) 75 mg tablet Take 1 tablet (75 mg total) by mouth once daily., Starting Sun 1/7/2018, Until Mon 1/7/2019, Normal      glipiZIDE (GLUCOTROL) 5 MG tablet Take 1 tablet (5 mg total) by mouth daily with breakfast., Starting Sat 1/6/2018, Until Sun 1/6/2019, Normal      lancets (ACCU-CHEK MULTICLIX LANCET) Misc 1 lancet by Misc.(Non-Drug; Combo Route) route 2 (two) times daily., Starting Sat 1/6/2018, Normal      lisinopril 10 MG tablet Take 1 tablet (10 mg total) by mouth once daily., Starting Sun 1/7/2018, Until Mon 1/7/2019, Normal      !! metFORMIN (GLUCOPHAGE) 500 MG tablet Wk 1: 1 tab/day,Wk 2: 1 tab. Twice/day, Wk 3: 2 tabs in the morning 1 tab at night,Wk 4: 2 tablets twice a day, Normal      !! metFORMIN (GLUCOPHAGE) 500 MG tablet Take 1 tablet (500 mg total) by mouth 2 (two) times daily with meals., Starting Sat 1/6/2018, Until Sun 1/6/2019, Normal       !! - Potential duplicate medications found. Please discuss with provider.          Estrella Loyd PA-C  Comprehensive Stroke Center  Department of Vascular Neurology   [facility]

## 2018-01-09 ENCOUNTER — TELEPHONE (OUTPATIENT)
Dept: NEUROSURGERY | Facility: HOSPITAL | Age: 63
End: 2018-01-09

## 2018-01-09 ENCOUNTER — PATIENT OUTREACH (OUTPATIENT)
Dept: ADMINISTRATIVE | Facility: CLINIC | Age: 63
End: 2018-01-09

## 2018-01-09 ENCOUNTER — TELEPHONE (OUTPATIENT)
Dept: NEUROLOGY | Facility: CLINIC | Age: 63
End: 2018-01-09

## 2018-01-09 RX ORDER — NAPROXEN SODIUM 220 MG/1
81 TABLET, FILM COATED ORAL DAILY
COMMUNITY
End: 2021-01-11

## 2018-01-09 NOTE — PATIENT INSTRUCTIONS
Stroke (Completed)    You have had a mild stroke, or cerebrovascular accident (CVA). This is caused by a loss of blood flow to part of your brain. This can occur when a blood clot forms inside the carotid artery (main artery from the heart to the brain) or inside the heart. When the clot travels to the brain, it can lodge in a blood vessel and block blood flow. The other common cause of stroke is a gradual narrowing of the arteries in the brain due to buildup of fatty deposits (plaque).  Symptoms  Blocked blood flow in different areas of the brain can cause different symptoms. If you have had a stroke before, a new one may be different. A memory aid for the basic signs of a stroke is F.A.S.T.  F.A.S.T.  · F: Face drooping, or numbness on one side. This may be more noticeable when you ask the affected person to smile.  · A: Arm weakness or numbness. The affected person may have trouble using or lifting one side.  · S: Speech difficulty. Speech may be slurred or hard to understand. The affected person may also use the wrong words.  · T: Time to call 911. Time is critical in treating a stroke. Call 911 as soon as you suspect a stroke has happened--even a small one. The sooner treatment is started the better, even if the symptoms go away.  Other common symptoms of a stroke include:  · Having difficulty getting the right words to come out  · Weakness in one leg  · Numbness on one side  · Difficulty walking  · Trouble with coordination  · Trouble with vision  · Headache  · Confusion  · Dizziness  Treatment  After you have had a stroke, you are at risk of having another. Be sure to follow up with your healthcare provider for further evaluation and treatment. If problems are found, your healthcare provider will recommend treatment with medicines and/or procedures.  To reduce your chance of having another stroke, you may be prescribed medicines. These include medicines to prevent blood clots, such as antiplatelet or  anticoagulant medicines.  Home care  · Rest at home and avoid exertion for the next few days.  · If your healthcare provider has prescribed medicines, take them as directed.  Follow-up care  Follow up with your healthcare provider, or as advised. Additional tests may be needed. If you had an X-ray, CT scan, MRI, or ECG (electrocardiogram), it will be reviewed by a specialist. You will be notified of any new findings that will affect your care.  Call 911  Contact emergency services right away if any of these occur:  · Any of your stroke symptoms worsen  · New problems with speech, confusion, vision, walking, coordination, facial droop, or weakness or numbness on one side of your body  · Severe headache, fainting spell, dizziness, or seizure  · Chest pain or shortness of breath  Remember F.A.S.T. (described above). If you notice warning signs and symptoms of stroke, CALL 911 without delay.  Date Last Reviewed: 9/21/2015  © 2547-2428 [employer]. [address] [place] [address], [place] [address]. All rights reserved. This information is not intended as a substitute for professional medical care. Always follow your healthcare professional's instructions.

## 2018-01-09 NOTE — TELEPHONE ENCOUNTER
----- Message from Oscar Issa RN sent at 1/9/2018  4:40 PM CST -----  Hey everyone,     I was wondering if you could schedule Mr. Sanchez a follow up appointment in 4-6 weeks with a stroke provider.     Thanks so much for all you do for myself and our stroke patients,  JEANETH GannonN-RN

## 2018-01-10 LAB — GAD65 AB SER-SCNC: 0 NMOL/L

## 2018-01-15 PROBLEM — R47.1 DYSARTHRIA: Status: ACTIVE | Noted: 2018-01-15

## 2018-01-15 PROBLEM — I63.432 EMBOLIC STROKE INVOLVING LEFT POSTERIOR CEREBRAL ARTERY: Status: ACTIVE | Noted: 2018-01-15

## 2018-01-15 PROBLEM — I10 ESSENTIAL HYPERTENSION: Status: ACTIVE | Noted: 2018-01-15

## 2018-01-15 PROBLEM — I63.512 ACUTE ISCHEMIC LEFT MCA STROKE: Status: RESOLVED | Noted: 2018-01-15 | Resolved: 2018-01-06

## 2018-01-24 ENCOUNTER — TELEPHONE (OUTPATIENT)
Dept: NEUROLOGY | Facility: HOSPITAL | Age: 63
End: 2018-01-24

## 2018-01-24 NOTE — TELEPHONE ENCOUNTER
Patient's female family member called vascular neurology phone 1/23/18 about 6pm. Stating patient's blood pressure was up to 220 systolic. She repeated and it was 201/130, again repeated the blood pressure and it is currently 160 systolic. Patient asymptomatic- currently denies any N/V, vision changes, SOB, CP, headache, weakness, numbness, or dizziness. Patient reports he took his blood pressure medication today. Discussed that if pressure elevates again above 200 systolic or 110 diastolic to call 911 to take the patient to the ER; or if he develops any new symptoms (n,v, SOB, CP, neurologic symptoms etc) to also call 911. Either way, patient instructed to call his PCP first thing tomorrow (1/24) morning.  Understanding stated.

## 2018-02-01 PROBLEM — Z92.82 TISSUE PLASMINOGEN ACTIVATOR (T-PA) ADMINISTERED AT OTHER FACILITY WITHIN 24 HOURS PRIOR TO CURRENT ADMISSION: Status: RESOLVED | Noted: 2018-01-04 | Resolved: 2018-02-01

## 2018-02-01 PROBLEM — Z86.73 HISTORY OF STROKE: Status: ACTIVE | Noted: 2018-01-04

## 2018-02-01 PROBLEM — G93.6 CYTOTOXIC CEREBRAL EDEMA: Status: RESOLVED | Noted: 2018-01-05 | Resolved: 2018-02-01

## 2018-04-06 ENCOUNTER — TELEPHONE (OUTPATIENT)
Dept: ADMINISTRATIVE | Facility: OTHER | Age: 63
End: 2018-04-06

## 2021-02-24 ENCOUNTER — OFFICE VISIT (OUTPATIENT)
Dept: DERMATOLOGY | Facility: CLINIC | Age: 66
End: 2021-02-24
Payer: MEDICARE

## 2021-02-24 VITALS — RESPIRATION RATE: 18 BRPM | HEIGHT: 71 IN | BODY MASS INDEX: 27.99 KG/M2 | WEIGHT: 199.94 LBS

## 2021-02-24 DIAGNOSIS — D48.5 NEOPLASM OF UNCERTAIN BEHAVIOR OF SKIN: ICD-10-CM

## 2021-02-24 PROCEDURE — 99499 UNLISTED E&M SERVICE: CPT | Mod: S$GLB,,, | Performed by: DERMATOLOGY

## 2021-02-24 PROCEDURE — 11102 TANGNTL BX SKIN SINGLE LES: CPT | Mod: PBBFAC,PO | Performed by: DERMATOLOGY

## 2021-02-24 PROCEDURE — 99499 NO LOS: ICD-10-PCS | Mod: S$GLB,,, | Performed by: DERMATOLOGY

## 2021-02-24 PROCEDURE — 99214 OFFICE O/P EST MOD 30 MIN: CPT | Mod: PBBFAC,25,PO | Performed by: DERMATOLOGY

## 2021-02-24 PROCEDURE — 11102 TANGNTL BX SKIN SINGLE LES: CPT | Mod: S$GLB,,, | Performed by: DERMATOLOGY

## 2021-02-24 PROCEDURE — 11102 PR TANGENTIAL BIOPSY, SKIN, SINGLE LESION: ICD-10-PCS | Mod: S$GLB,,, | Performed by: DERMATOLOGY

## 2021-02-24 PROCEDURE — 88305 TISSUE EXAM BY PATHOLOGIST: ICD-10-PCS | Mod: 26,,, | Performed by: PATHOLOGY

## 2021-02-24 PROCEDURE — 88305 TISSUE EXAM BY PATHOLOGIST: CPT | Mod: 26,,, | Performed by: PATHOLOGY

## 2021-02-24 PROCEDURE — 99999 PR PBB SHADOW E&M-EST. PATIENT-LVL IV: CPT | Mod: PBBFAC,,, | Performed by: DERMATOLOGY

## 2021-02-24 PROCEDURE — 99999 PR PBB SHADOW E&M-EST. PATIENT-LVL IV: ICD-10-PCS | Mod: PBBFAC,,, | Performed by: DERMATOLOGY

## 2021-02-24 PROCEDURE — 88305 TISSUE EXAM BY PATHOLOGIST: CPT | Performed by: PATHOLOGY

## 2021-02-26 LAB
FINAL PATHOLOGIC DIAGNOSIS: NORMAL
GROSS: NORMAL

## 2021-03-05 ENCOUNTER — TELEPHONE (OUTPATIENT)
Dept: DERMATOLOGY | Facility: CLINIC | Age: 66
End: 2021-03-05

## 2021-04-01 ENCOUNTER — TELEPHONE (OUTPATIENT)
Dept: DERMATOLOGY | Facility: CLINIC | Age: 66
End: 2021-04-01

## 2021-04-07 ENCOUNTER — OFFICE VISIT (OUTPATIENT)
Dept: DERMATOLOGY | Facility: CLINIC | Age: 66
End: 2021-04-07
Payer: MEDICARE

## 2021-04-07 VITALS
DIASTOLIC BLOOD PRESSURE: 65 MMHG | HEIGHT: 70 IN | BODY MASS INDEX: 27.63 KG/M2 | HEART RATE: 72 BPM | SYSTOLIC BLOOD PRESSURE: 139 MMHG | WEIGHT: 193 LBS

## 2021-04-07 DIAGNOSIS — C44.311 BASAL CELL CARCINOMA OF NOSE: Primary | ICD-10-CM

## 2021-04-07 PROCEDURE — 3008F BODY MASS INDEX DOCD: CPT | Mod: CPTII,S$GLB,, | Performed by: DERMATOLOGY

## 2021-04-07 PROCEDURE — 3008F PR BODY MASS INDEX (BMI) DOCUMENTED: ICD-10-PCS | Mod: CPTII,S$GLB,, | Performed by: DERMATOLOGY

## 2021-04-07 PROCEDURE — 1101F PR PT FALLS ASSESS DOC 0-1 FALLS W/OUT INJ PAST YR: ICD-10-PCS | Mod: CPTII,S$GLB,, | Performed by: DERMATOLOGY

## 2021-04-07 PROCEDURE — 3288F PR FALLS RISK ASSESSMENT DOCUMENTED: ICD-10-PCS | Mod: CPTII,S$GLB,, | Performed by: DERMATOLOGY

## 2021-04-07 PROCEDURE — 1101F PT FALLS ASSESS-DOCD LE1/YR: CPT | Mod: CPTII,S$GLB,, | Performed by: DERMATOLOGY

## 2021-04-07 PROCEDURE — 1126F PR PAIN SEVERITY QUANTIFIED, NO PAIN PRESENT: ICD-10-PCS | Mod: S$GLB,,, | Performed by: DERMATOLOGY

## 2021-04-07 PROCEDURE — 99999 PR PBB SHADOW E&M-EST. PATIENT-LVL III: CPT | Mod: PBBFAC,,, | Performed by: DERMATOLOGY

## 2021-04-07 PROCEDURE — 99999 PR PBB SHADOW E&M-EST. PATIENT-LVL III: ICD-10-PCS | Mod: PBBFAC,,, | Performed by: DERMATOLOGY

## 2021-04-07 PROCEDURE — 1126F AMNT PAIN NOTED NONE PRSNT: CPT | Mod: S$GLB,,, | Performed by: DERMATOLOGY

## 2021-04-07 PROCEDURE — 1159F PR MEDICATION LIST DOCUMENTED IN MEDICAL RECORD: ICD-10-PCS | Mod: S$GLB,,, | Performed by: DERMATOLOGY

## 2021-04-07 PROCEDURE — 1159F MED LIST DOCD IN RCRD: CPT | Mod: S$GLB,,, | Performed by: DERMATOLOGY

## 2021-04-07 PROCEDURE — 3288F FALL RISK ASSESSMENT DOCD: CPT | Mod: CPTII,S$GLB,, | Performed by: DERMATOLOGY

## 2021-04-07 PROCEDURE — 99213 PR OFFICE/OUTPT VISIT, EST, LEVL III, 20-29 MIN: ICD-10-PCS | Mod: S$GLB,,, | Performed by: DERMATOLOGY

## 2021-04-07 PROCEDURE — 99213 OFFICE O/P EST LOW 20 MIN: CPT | Mod: S$GLB,,, | Performed by: DERMATOLOGY

## 2021-04-12 ENCOUNTER — TELEPHONE (OUTPATIENT)
Dept: HEMATOLOGY/ONCOLOGY | Facility: CLINIC | Age: 66
End: 2021-04-12

## 2021-04-20 ENCOUNTER — OFFICE VISIT (OUTPATIENT)
Dept: HEMATOLOGY/ONCOLOGY | Facility: CLINIC | Age: 66
End: 2021-04-20
Payer: MEDICARE

## 2021-04-20 VITALS
HEIGHT: 71 IN | SYSTOLIC BLOOD PRESSURE: 161 MMHG | HEART RATE: 60 BPM | OXYGEN SATURATION: 98 % | WEIGHT: 195.19 LBS | BODY MASS INDEX: 27.33 KG/M2 | DIASTOLIC BLOOD PRESSURE: 74 MMHG | TEMPERATURE: 98 F

## 2021-04-20 DIAGNOSIS — C44.311 BASAL CELL CARCINOMA OF NOSE: ICD-10-CM

## 2021-04-20 DIAGNOSIS — C44.311 BASAL CELL CARCINOMA OF NOSE: Primary | ICD-10-CM

## 2021-04-20 PROCEDURE — 1159F MED LIST DOCD IN RCRD: CPT | Mod: S$GLB,,, | Performed by: OTOLARYNGOLOGY

## 2021-04-20 PROCEDURE — 99999 PR PBB SHADOW E&M-EST. PATIENT-LVL III: CPT | Mod: PBBFAC,,, | Performed by: OTOLARYNGOLOGY

## 2021-04-20 PROCEDURE — 99203 PR OFFICE/OUTPT VISIT, NEW, LEVL III, 30-44 MIN: ICD-10-PCS | Mod: S$GLB,,, | Performed by: OTOLARYNGOLOGY

## 2021-04-20 PROCEDURE — 3288F PR FALLS RISK ASSESSMENT DOCUMENTED: ICD-10-PCS | Mod: CPTII,S$GLB,, | Performed by: OTOLARYNGOLOGY

## 2021-04-20 PROCEDURE — 1101F PR PT FALLS ASSESS DOC 0-1 FALLS W/OUT INJ PAST YR: ICD-10-PCS | Mod: CPTII,S$GLB,, | Performed by: OTOLARYNGOLOGY

## 2021-04-20 PROCEDURE — 3008F BODY MASS INDEX DOCD: CPT | Mod: CPTII,S$GLB,, | Performed by: OTOLARYNGOLOGY

## 2021-04-20 PROCEDURE — 3288F FALL RISK ASSESSMENT DOCD: CPT | Mod: CPTII,S$GLB,, | Performed by: OTOLARYNGOLOGY

## 2021-04-20 PROCEDURE — 1126F PR PAIN SEVERITY QUANTIFIED, NO PAIN PRESENT: ICD-10-PCS | Mod: S$GLB,,, | Performed by: OTOLARYNGOLOGY

## 2021-04-20 PROCEDURE — 3008F PR BODY MASS INDEX (BMI) DOCUMENTED: ICD-10-PCS | Mod: CPTII,S$GLB,, | Performed by: OTOLARYNGOLOGY

## 2021-04-20 PROCEDURE — 1126F AMNT PAIN NOTED NONE PRSNT: CPT | Mod: S$GLB,,, | Performed by: OTOLARYNGOLOGY

## 2021-04-20 PROCEDURE — 99203 OFFICE O/P NEW LOW 30 MIN: CPT | Mod: S$GLB,,, | Performed by: OTOLARYNGOLOGY

## 2021-04-20 PROCEDURE — 1159F PR MEDICATION LIST DOCUMENTED IN MEDICAL RECORD: ICD-10-PCS | Mod: S$GLB,,, | Performed by: OTOLARYNGOLOGY

## 2021-04-20 PROCEDURE — 99999 PR PBB SHADOW E&M-EST. PATIENT-LVL III: ICD-10-PCS | Mod: PBBFAC,,, | Performed by: OTOLARYNGOLOGY

## 2021-04-20 PROCEDURE — 1101F PT FALLS ASSESS-DOCD LE1/YR: CPT | Mod: CPTII,S$GLB,, | Performed by: OTOLARYNGOLOGY

## 2021-04-22 ENCOUNTER — OFFICE VISIT (OUTPATIENT)
Dept: HEMATOLOGY/ONCOLOGY | Facility: CLINIC | Age: 66
End: 2021-04-22
Payer: MEDICARE

## 2021-04-22 ENCOUNTER — SPECIALTY PHARMACY (OUTPATIENT)
Dept: PHARMACY | Facility: CLINIC | Age: 66
End: 2021-04-22

## 2021-04-22 ENCOUNTER — LAB VISIT (OUTPATIENT)
Dept: LAB | Facility: HOSPITAL | Age: 66
End: 2021-04-22
Attending: INTERNAL MEDICINE
Payer: MEDICARE

## 2021-04-22 VITALS
OXYGEN SATURATION: 98 % | DIASTOLIC BLOOD PRESSURE: 66 MMHG | BODY MASS INDEX: 27.87 KG/M2 | RESPIRATION RATE: 18 BRPM | HEART RATE: 52 BPM | SYSTOLIC BLOOD PRESSURE: 161 MMHG | WEIGHT: 199.06 LBS | TEMPERATURE: 98 F | HEIGHT: 71 IN

## 2021-04-22 DIAGNOSIS — E11.49 TYPE 2 DIABETES MELLITUS WITH NEUROLOGICAL COMPLICATIONS: Primary | ICD-10-CM

## 2021-04-22 DIAGNOSIS — C44.311 BASAL CELL CARCINOMA OF NOSE: ICD-10-CM

## 2021-04-22 DIAGNOSIS — Z86.73 HISTORY OF STROKE: ICD-10-CM

## 2021-04-22 DIAGNOSIS — I10 ESSENTIAL HYPERTENSION: ICD-10-CM

## 2021-04-22 LAB
ALBUMIN SERPL BCP-MCNC: 4.4 G/DL (ref 3.5–5.2)
ALP SERPL-CCNC: 102 U/L (ref 38–145)
ALT SERPL W/O P-5'-P-CCNC: 29 U/L (ref 0–50)
ANION GAP SERPL CALC-SCNC: 9 MMOL/L (ref 8–16)
AST SERPL-CCNC: 28 U/L (ref 17–59)
BASOPHILS # BLD AUTO: 0.04 K/UL (ref 0–0.2)
BASOPHILS NFR BLD: 0.5 % (ref 0–1.9)
BILIRUB SERPL-MCNC: 0.4 MG/DL (ref 0.2–1.3)
CALCIUM SERPL-MCNC: 10.3 MG/DL (ref 8.4–10.2)
CHLORIDE SERPL-SCNC: 101 MMOL/L (ref 95–110)
CK SERPL-CCNC: 137 U/L (ref 55–170)
CO2 SERPL-SCNC: 30 MMOL/L (ref 22–31)
CREAT SERPL-MCNC: 0.91 MG/DL (ref 0.5–1.4)
DIFFERENTIAL METHOD: ABNORMAL
EOSINOPHIL # BLD AUTO: 0.1 K/UL (ref 0–0.5)
EOSINOPHIL NFR BLD: 1.2 % (ref 0–8)
ERYTHROCYTE [DISTWIDTH] IN BLOOD BY AUTOMATED COUNT: 11.9 % (ref 11.5–14.5)
EST. GFR  (AFRICAN AMERICAN): >60 ML/MIN/1.73 M^2
EST. GFR  (NON AFRICAN AMERICAN): >60 ML/MIN/1.73 M^2
GLUCOSE SERPL-MCNC: 202 MG/DL (ref 70–110)
HCT VFR BLD AUTO: 42.1 % (ref 40–54)
HGB BLD-MCNC: 14.5 G/DL (ref 14–18)
IMM GRANULOCYTES # BLD AUTO: 0.02 K/UL (ref 0–0.04)
IMM GRANULOCYTES NFR BLD AUTO: 0.3 % (ref 0–0.5)
LYMPHOCYTES # BLD AUTO: 3.2 K/UL (ref 1–4.8)
LYMPHOCYTES NFR BLD: 41.5 % (ref 18–48)
MCH RBC QN AUTO: 33 PG (ref 27–31)
MCHC RBC AUTO-ENTMCNC: 34.4 G/DL (ref 32–36)
MCV RBC AUTO: 96 FL (ref 82–98)
MONOCYTES # BLD AUTO: 0.5 K/UL (ref 0.3–1)
MONOCYTES NFR BLD: 6.2 % (ref 4–15)
NEUTROPHILS # BLD AUTO: 3.9 K/UL (ref 1.8–7.7)
NEUTROPHILS NFR BLD: 50.3 % (ref 38–73)
NRBC BLD-RTO: 0 /100 WBC
PLATELET # BLD AUTO: 240 K/UL (ref 150–450)
PMV BLD AUTO: 9.8 FL (ref 9.2–12.9)
POTASSIUM SERPL-SCNC: 4.5 MMOL/L (ref 3.5–5.1)
PROT SERPL-MCNC: 7.8 G/DL (ref 6–8.4)
RBC # BLD AUTO: 4.4 M/UL (ref 4.6–6.2)
SODIUM SERPL-SCNC: 140 MMOL/L (ref 136–145)
UUN UR-MCNC: 16 MG/DL (ref 9–21)
WBC # BLD AUTO: 7.76 K/UL (ref 3.9–12.7)

## 2021-04-22 PROCEDURE — 1126F PR PAIN SEVERITY QUANTIFIED, NO PAIN PRESENT: ICD-10-PCS | Mod: S$GLB,,, | Performed by: INTERNAL MEDICINE

## 2021-04-22 PROCEDURE — 82550 ASSAY OF CK (CPK): CPT | Mod: PN | Performed by: INTERNAL MEDICINE

## 2021-04-22 PROCEDURE — 3288F FALL RISK ASSESSMENT DOCD: CPT | Mod: CPTII,S$GLB,, | Performed by: INTERNAL MEDICINE

## 2021-04-22 PROCEDURE — 85025 COMPLETE CBC W/AUTO DIFF WBC: CPT | Mod: PN | Performed by: INTERNAL MEDICINE

## 2021-04-22 PROCEDURE — 99205 OFFICE O/P NEW HI 60 MIN: CPT | Mod: S$GLB,,, | Performed by: INTERNAL MEDICINE

## 2021-04-22 PROCEDURE — 99999 PR PBB SHADOW E&M-EST. PATIENT-LVL III: CPT | Mod: PBBFAC,,, | Performed by: INTERNAL MEDICINE

## 2021-04-22 PROCEDURE — 99999 PR PBB SHADOW E&M-EST. PATIENT-LVL III: ICD-10-PCS | Mod: PBBFAC,,, | Performed by: INTERNAL MEDICINE

## 2021-04-22 PROCEDURE — 99205 PR OFFICE/OUTPT VISIT, NEW, LEVL V, 60-74 MIN: ICD-10-PCS | Mod: S$GLB,,, | Performed by: INTERNAL MEDICINE

## 2021-04-22 PROCEDURE — 3008F BODY MASS INDEX DOCD: CPT | Mod: CPTII,S$GLB,, | Performed by: INTERNAL MEDICINE

## 2021-04-22 PROCEDURE — 80053 COMPREHEN METABOLIC PANEL: CPT | Performed by: INTERNAL MEDICINE

## 2021-04-22 PROCEDURE — 1159F PR MEDICATION LIST DOCUMENTED IN MEDICAL RECORD: ICD-10-PCS | Mod: S$GLB,,, | Performed by: INTERNAL MEDICINE

## 2021-04-22 PROCEDURE — 1126F AMNT PAIN NOTED NONE PRSNT: CPT | Mod: S$GLB,,, | Performed by: INTERNAL MEDICINE

## 2021-04-22 PROCEDURE — 82550 ASSAY OF CK (CPK): CPT | Performed by: INTERNAL MEDICINE

## 2021-04-22 PROCEDURE — 1159F MED LIST DOCD IN RCRD: CPT | Mod: S$GLB,,, | Performed by: INTERNAL MEDICINE

## 2021-04-22 PROCEDURE — 3008F PR BODY MASS INDEX (BMI) DOCUMENTED: ICD-10-PCS | Mod: CPTII,S$GLB,, | Performed by: INTERNAL MEDICINE

## 2021-04-22 PROCEDURE — 1101F PR PT FALLS ASSESS DOC 0-1 FALLS W/OUT INJ PAST YR: ICD-10-PCS | Mod: CPTII,S$GLB,, | Performed by: INTERNAL MEDICINE

## 2021-04-22 PROCEDURE — 85025 COMPLETE CBC W/AUTO DIFF WBC: CPT | Performed by: INTERNAL MEDICINE

## 2021-04-22 PROCEDURE — 1101F PT FALLS ASSESS-DOCD LE1/YR: CPT | Mod: CPTII,S$GLB,, | Performed by: INTERNAL MEDICINE

## 2021-04-22 PROCEDURE — 80053 COMPREHEN METABOLIC PANEL: CPT | Mod: PN | Performed by: INTERNAL MEDICINE

## 2021-04-22 PROCEDURE — 36415 COLL VENOUS BLD VENIPUNCTURE: CPT | Mod: PN | Performed by: INTERNAL MEDICINE

## 2021-04-22 PROCEDURE — 3288F PR FALLS RISK ASSESSMENT DOCUMENTED: ICD-10-PCS | Mod: CPTII,S$GLB,, | Performed by: INTERNAL MEDICINE

## 2021-04-27 NOTE — ED PROVIDER NOTES
Encounter Date: 1/4/2018    SCRIBE #1 NOTE: I, Aminah Nicole, am scribing for, and in the presence of,  Dr. Lockhart. I have scribed the following portions of the note - the Resident attestation.       History     Chief Complaint   Patient presents with    Cerebrovascular Accident     Pt transferred from [facility] for Neuro eval following Stroke with TPA admin     HPI     62-year-old  male presents to the emergency department after he was transferred from another hospital due to possible stroke.  Patient was in his normal state health until about 30 minutes prior to arrival at [facility] when he started to have difficulty speaking.  He went to [facility] and was given tPA for stroke.  Patient currently still having issues with speaking.  Patient states that usually speaks quietly normally and is now slurring his words and having word finding difficulty.  Denies any chest pain, short of breath, nausea, vomiting, lightheadedness, or any weakness.    Review of patient's allergies indicates:  No Known Allergies  History reviewed. No pertinent past medical history.  History reviewed. No pertinent surgical history.  Family History   Problem Relation Age of Onset    Diabetes Mother      Social History   Substance Use Topics    Smoking status: Former Smoker    Smokeless tobacco: Never Used    Alcohol use No     Review of Systems   Constitutional: Negative for activity change and appetite change.   HENT: Negative for congestion and ear pain.    Eyes: Negative for pain and redness.   Respiratory: Negative for cough and shortness of breath.    Cardiovascular: Negative for chest pain and leg swelling.   Gastrointestinal: Negative for abdominal distention and abdominal pain.   Genitourinary: Negative for difficulty urinating and dysuria.   Musculoskeletal: Negative for arthralgias and back pain.   Skin: Negative for color change and rash.   Neurological: Positive for speech difficulty. Negative for  Pt appears to be sleeping on stretcher, no visible signs of pain or distress noted  Q7min checks continued  Will hold meds until pt awakes        Real Szymanski RN  04/27/21 9573 light-headedness and headaches.   All other systems reviewed and are negative.      Physical Exam     Initial Vitals   BP Pulse Resp Temp SpO2   01/04/18 1901 01/04/18 1901 01/04/18 1901 01/04/18 1908 01/04/18 1901   (!) 193/102 78 18 97.9 °F (36.6 °C) 98 %      MAP       01/04/18 1901       132.33         Physical Exam    Nursing note and vitals reviewed.  Constitutional: He appears well-developed and well-nourished. He is not diaphoretic. No distress.   HENT:   Head: Normocephalic and atraumatic.   Mouth/Throat: No oropharyngeal exudate.   Eyes: EOM are normal. Pupils are equal, round, and reactive to light.   Neck: Normal range of motion. Neck supple. No JVD present.   Cardiovascular: Normal rate, regular rhythm, normal heart sounds and intact distal pulses. Exam reveals no friction rub.    No murmur heard.  Pulmonary/Chest: Breath sounds normal. No stridor. No respiratory distress. He has no wheezes.   Abdominal: Soft. Bowel sounds are normal. He exhibits no distension. There is no tenderness.   Musculoskeletal: Normal range of motion. He exhibits no edema or tenderness.   Neurological: He is alert and oriented to person, place, and time. He has normal strength.   Strength of the upper and lower extremities 5 out of 5 bilaterally, sensation intact, no tremor noted, right-sided facial droop, word finding difficulty, slurred speech   Skin: Skin is warm and dry. No rash noted. No erythema.         ED Course   Procedures  Labs Reviewed   LIPID PANEL - Abnormal; Notable for the following:        Result Value    Cholesterol 373 (*)     HDL 36 (*)     HDL/Chol Ratio 9.7 (*)     Total Cholesterol/HDL Ratio 10.4 (*)     All other components within normal limits   POCT GLUCOSE - Abnormal; Notable for the following:     POCT Glucose 239 (*)     All other components within normal limits   HEMOGLOBIN A1C   TSH   TYPE & SCREEN   POCT GLUCOSE MONITORING CONTINUOUS          HOIII MDM  A/P: 62-year-old with slurred speech.   Differential diagnosis includes but is not limited to TIA, stroke, intoxication.  Vascular neurology at the bedside evaluating.  CTA ordered - which shows no signs of any significant obstruction, or ischemia.  Will likely admit to neuro critical care.  Hermes Weller PGY-3 [employer] EM  01/04/2018 8:17 PM    Update:   Patient will be admitted to neuro critical care.  Hermes Weller PGY-3 [employer] EM  01/04/2018 8:52 PM       Medical Decision Making:   History:   Old Medical Records: I decided to obtain old medical records.  Independently Interpreted Test(s):   I have ordered and independently interpreted EKG Reading(s) - see prior notes  Clinical Tests:   Lab Tests: Ordered and Reviewed  Radiological Study: Ordered and Reviewed  Medical Tests: Ordered and Reviewed            Scribe Attestation:   Scribe #1: I performed the above scribed service and the documentation accurately describes the services I performed. I attest to the accuracy of the note.    Attending Attestation:   Physician Attestation Statement for Resident:  As the supervising MD   Physician Attestation Statement: I have personally seen and examined this patient.   I agree with the above history. -:   As the supervising MD I agree with the above PE.    As the supervising MD I agree with the above treatment, course, plan, and disposition.                    ED Course      Clinical Impression:   Diagnoses of Acute ischemic left MCA stroke and Acute ischemic left MCA stroke were pertinent to this visit.    Disposition:   Disposition: Admitted                        Helder Lockhart MD  01/04/18 2151       Helder Lockhrat MD  01/04/18 2152

## 2021-04-28 ENCOUNTER — SPECIALTY PHARMACY (OUTPATIENT)
Dept: PHARMACY | Facility: CLINIC | Age: 66
End: 2021-04-28

## 2021-05-07 ENCOUNTER — SPECIALTY PHARMACY (OUTPATIENT)
Dept: PHARMACY | Facility: CLINIC | Age: 66
End: 2021-05-07

## 2021-05-21 ENCOUNTER — SPECIALTY PHARMACY (OUTPATIENT)
Dept: PHARMACY | Facility: CLINIC | Age: 66
End: 2021-05-21

## 2021-06-01 ENCOUNTER — OFFICE VISIT (OUTPATIENT)
Dept: HEMATOLOGY/ONCOLOGY | Facility: CLINIC | Age: 66
End: 2021-06-01
Payer: MEDICARE

## 2021-06-01 DIAGNOSIS — C44.311 BASAL CELL CARCINOMA OF NOSE: Primary | ICD-10-CM

## 2021-06-01 PROCEDURE — 3288F FALL RISK ASSESSMENT DOCD: CPT | Mod: CPTII,S$GLB,, | Performed by: INTERNAL MEDICINE

## 2021-06-01 PROCEDURE — 99024 POSTOP FOLLOW-UP VISIT: CPT | Mod: S$GLB,,, | Performed by: INTERNAL MEDICINE

## 2021-06-01 PROCEDURE — 1101F PR PT FALLS ASSESS DOC 0-1 FALLS W/OUT INJ PAST YR: ICD-10-PCS | Mod: CPTII,S$GLB,, | Performed by: INTERNAL MEDICINE

## 2021-06-01 PROCEDURE — 1101F PT FALLS ASSESS-DOCD LE1/YR: CPT | Mod: CPTII,S$GLB,, | Performed by: INTERNAL MEDICINE

## 2021-06-01 PROCEDURE — 99024 PR POST-OP FOLLOW-UP VISIT: ICD-10-PCS | Mod: S$GLB,,, | Performed by: INTERNAL MEDICINE

## 2021-06-01 PROCEDURE — 3288F PR FALLS RISK ASSESSMENT DOCUMENTED: ICD-10-PCS | Mod: CPTII,S$GLB,, | Performed by: INTERNAL MEDICINE

## 2021-06-02 ENCOUNTER — LAB VISIT (OUTPATIENT)
Dept: LAB | Facility: HOSPITAL | Age: 66
End: 2021-06-02
Attending: INTERNAL MEDICINE
Payer: MEDICARE

## 2021-06-02 ENCOUNTER — OFFICE VISIT (OUTPATIENT)
Dept: HEMATOLOGY/ONCOLOGY | Facility: CLINIC | Age: 66
End: 2021-06-02
Payer: MEDICARE

## 2021-06-02 VITALS
DIASTOLIC BLOOD PRESSURE: 77 MMHG | BODY MASS INDEX: 27.5 KG/M2 | HEIGHT: 71 IN | WEIGHT: 196.44 LBS | SYSTOLIC BLOOD PRESSURE: 145 MMHG | RESPIRATION RATE: 18 BRPM | TEMPERATURE: 98 F | HEART RATE: 71 BPM | OXYGEN SATURATION: 97 %

## 2021-06-02 DIAGNOSIS — Z55.0 ILLITERACY: ICD-10-CM

## 2021-06-02 DIAGNOSIS — R74.8 ELEVATED CK: ICD-10-CM

## 2021-06-02 DIAGNOSIS — E11.49 TYPE 2 DIABETES MELLITUS WITH NEUROLOGICAL COMPLICATIONS: ICD-10-CM

## 2021-06-02 DIAGNOSIS — C44.311 BASAL CELL CARCINOMA OF NOSE: ICD-10-CM

## 2021-06-02 DIAGNOSIS — C44.311 BASAL CELL CARCINOMA OF NOSE: Primary | ICD-10-CM

## 2021-06-02 DIAGNOSIS — I10 ESSENTIAL HYPERTENSION: ICD-10-CM

## 2021-06-02 LAB
ALBUMIN SERPL BCP-MCNC: 4.2 G/DL (ref 3.5–5.2)
ALP SERPL-CCNC: 80 U/L (ref 38–145)
ALT SERPL W/O P-5'-P-CCNC: 40 U/L (ref 0–50)
ANION GAP SERPL CALC-SCNC: 6 MMOL/L (ref 8–16)
AST SERPL-CCNC: 36 U/L (ref 17–59)
BASOPHILS # BLD AUTO: 0.03 K/UL (ref 0–0.2)
BASOPHILS NFR BLD: 0.3 % (ref 0–1.9)
BILIRUB SERPL-MCNC: 0.5 MG/DL (ref 0.2–1.3)
CALCIUM SERPL-MCNC: 9.8 MG/DL (ref 8.4–10.2)
CHLORIDE SERPL-SCNC: 104 MMOL/L (ref 95–110)
CK SERPL-CCNC: 335 U/L (ref 55–170)
CO2 SERPL-SCNC: 30 MMOL/L (ref 22–31)
CREAT SERPL-MCNC: 1.12 MG/DL (ref 0.5–1.4)
DIFFERENTIAL METHOD: ABNORMAL
EOSINOPHIL # BLD AUTO: 0 K/UL (ref 0–0.5)
EOSINOPHIL NFR BLD: 0.4 % (ref 0–8)
ERYTHROCYTE [DISTWIDTH] IN BLOOD BY AUTOMATED COUNT: 11.9 % (ref 11.5–14.5)
EST. GFR  (AFRICAN AMERICAN): >60 ML/MIN/1.73 M^2
EST. GFR  (NON AFRICAN AMERICAN): >60 ML/MIN/1.73 M^2
GLUCOSE SERPL-MCNC: 258 MG/DL (ref 70–110)
HCT VFR BLD AUTO: 38.3 % (ref 40–54)
HGB BLD-MCNC: 13.1 G/DL (ref 14–18)
IMM GRANULOCYTES # BLD AUTO: 0.03 K/UL (ref 0–0.04)
IMM GRANULOCYTES NFR BLD AUTO: 0.3 % (ref 0–0.5)
LYMPHOCYTES # BLD AUTO: 2.7 K/UL (ref 1–4.8)
LYMPHOCYTES NFR BLD: 28.5 % (ref 18–48)
MCH RBC QN AUTO: 33.3 PG (ref 27–31)
MCHC RBC AUTO-ENTMCNC: 34.2 G/DL (ref 32–36)
MCV RBC AUTO: 98 FL (ref 82–98)
MONOCYTES # BLD AUTO: 0.4 K/UL (ref 0.3–1)
MONOCYTES NFR BLD: 4.6 % (ref 4–15)
NEUTROPHILS # BLD AUTO: 6.2 K/UL (ref 1.8–7.7)
NEUTROPHILS NFR BLD: 65.9 % (ref 38–73)
NRBC BLD-RTO: 0 /100 WBC
PLATELET # BLD AUTO: 196 K/UL (ref 150–450)
PMV BLD AUTO: 10.2 FL (ref 9.2–12.9)
POTASSIUM SERPL-SCNC: 4.4 MMOL/L (ref 3.5–5.1)
PROT SERPL-MCNC: 7.1 G/DL (ref 6–8.4)
RBC # BLD AUTO: 3.93 M/UL (ref 4.6–6.2)
SODIUM SERPL-SCNC: 140 MMOL/L (ref 136–145)
UUN UR-MCNC: 18 MG/DL (ref 9–21)
WBC # BLD AUTO: 9.43 K/UL (ref 3.9–12.7)

## 2021-06-02 PROCEDURE — 80053 COMPREHEN METABOLIC PANEL: CPT | Performed by: INTERNAL MEDICINE

## 2021-06-02 PROCEDURE — 3008F PR BODY MASS INDEX (BMI) DOCUMENTED: ICD-10-PCS | Mod: CPTII,S$GLB,, | Performed by: INTERNAL MEDICINE

## 2021-06-02 PROCEDURE — 99214 OFFICE O/P EST MOD 30 MIN: CPT | Mod: S$GLB,,, | Performed by: INTERNAL MEDICINE

## 2021-06-02 PROCEDURE — 85025 COMPLETE CBC W/AUTO DIFF WBC: CPT | Mod: PN | Performed by: INTERNAL MEDICINE

## 2021-06-02 PROCEDURE — 1126F PR PAIN SEVERITY QUANTIFIED, NO PAIN PRESENT: ICD-10-PCS | Mod: S$GLB,,, | Performed by: INTERNAL MEDICINE

## 2021-06-02 PROCEDURE — 99999 PR PBB SHADOW E&M-EST. PATIENT-LVL III: CPT | Mod: PBBFAC,,, | Performed by: INTERNAL MEDICINE

## 2021-06-02 PROCEDURE — 1101F PR PT FALLS ASSESS DOC 0-1 FALLS W/OUT INJ PAST YR: ICD-10-PCS | Mod: CPTII,S$GLB,, | Performed by: INTERNAL MEDICINE

## 2021-06-02 PROCEDURE — 85025 COMPLETE CBC W/AUTO DIFF WBC: CPT | Performed by: INTERNAL MEDICINE

## 2021-06-02 PROCEDURE — 3288F FALL RISK ASSESSMENT DOCD: CPT | Mod: CPTII,S$GLB,, | Performed by: INTERNAL MEDICINE

## 2021-06-02 PROCEDURE — 3288F PR FALLS RISK ASSESSMENT DOCUMENTED: ICD-10-PCS | Mod: CPTII,S$GLB,, | Performed by: INTERNAL MEDICINE

## 2021-06-02 PROCEDURE — 99214 PR OFFICE/OUTPT VISIT, EST, LEVL IV, 30-39 MIN: ICD-10-PCS | Mod: S$GLB,,, | Performed by: INTERNAL MEDICINE

## 2021-06-02 PROCEDURE — 3008F BODY MASS INDEX DOCD: CPT | Mod: CPTII,S$GLB,, | Performed by: INTERNAL MEDICINE

## 2021-06-02 PROCEDURE — 80053 COMPREHEN METABOLIC PANEL: CPT | Mod: PN | Performed by: INTERNAL MEDICINE

## 2021-06-02 PROCEDURE — 1159F MED LIST DOCD IN RCRD: CPT | Mod: S$GLB,,, | Performed by: INTERNAL MEDICINE

## 2021-06-02 PROCEDURE — 1101F PT FALLS ASSESS-DOCD LE1/YR: CPT | Mod: CPTII,S$GLB,, | Performed by: INTERNAL MEDICINE

## 2021-06-02 PROCEDURE — 82550 ASSAY OF CK (CPK): CPT | Mod: PN | Performed by: INTERNAL MEDICINE

## 2021-06-02 PROCEDURE — 99999 PR PBB SHADOW E&M-EST. PATIENT-LVL III: ICD-10-PCS | Mod: PBBFAC,,, | Performed by: INTERNAL MEDICINE

## 2021-06-02 PROCEDURE — 82550 ASSAY OF CK (CPK): CPT | Performed by: INTERNAL MEDICINE

## 2021-06-02 PROCEDURE — 1126F AMNT PAIN NOTED NONE PRSNT: CPT | Mod: S$GLB,,, | Performed by: INTERNAL MEDICINE

## 2021-06-02 PROCEDURE — 1159F PR MEDICATION LIST DOCUMENTED IN MEDICAL RECORD: ICD-10-PCS | Mod: S$GLB,,, | Performed by: INTERNAL MEDICINE

## 2021-06-02 PROCEDURE — 36415 COLL VENOUS BLD VENIPUNCTURE: CPT | Mod: PN | Performed by: INTERNAL MEDICINE

## 2021-06-02 SDOH — SOCIAL DETERMINANTS OF HEALTH (SDOH): ILITERACY AND LOW LEVEL LITERACY: Z55.0

## 2021-06-18 ENCOUNTER — SPECIALTY PHARMACY (OUTPATIENT)
Dept: PHARMACY | Facility: CLINIC | Age: 66
End: 2021-06-18

## 2021-07-13 ENCOUNTER — TELEPHONE (OUTPATIENT)
Dept: HEMATOLOGY/ONCOLOGY | Facility: CLINIC | Age: 66
End: 2021-07-13

## 2021-07-14 ENCOUNTER — LAB VISIT (OUTPATIENT)
Dept: LAB | Facility: HOSPITAL | Age: 66
End: 2021-07-14
Attending: INTERNAL MEDICINE
Payer: MEDICARE

## 2021-07-14 DIAGNOSIS — C44.311 BASAL CELL CARCINOMA OF NOSE: ICD-10-CM

## 2021-07-14 LAB
ALBUMIN SERPL BCP-MCNC: 4 G/DL (ref 3.5–5.2)
ALP SERPL-CCNC: 80 U/L (ref 55–135)
ALT SERPL W/O P-5'-P-CCNC: 30 U/L (ref 10–44)
ANION GAP SERPL CALC-SCNC: 12 MMOL/L (ref 8–16)
AST SERPL-CCNC: 15 U/L (ref 10–40)
BASOPHILS # BLD AUTO: 0.05 K/UL (ref 0–0.2)
BASOPHILS NFR BLD: 0.4 % (ref 0–1.9)
BILIRUB SERPL-MCNC: 0.4 MG/DL (ref 0.1–1)
BUN SERPL-MCNC: 10 MG/DL (ref 8–23)
CALCIUM SERPL-MCNC: 10.7 MG/DL (ref 8.7–10.5)
CHLORIDE SERPL-SCNC: 105 MMOL/L (ref 95–110)
CK SERPL-CCNC: 204 U/L (ref 20–200)
CO2 SERPL-SCNC: 24 MMOL/L (ref 23–29)
CREAT SERPL-MCNC: 1 MG/DL (ref 0.5–1.4)
DIFFERENTIAL METHOD: ABNORMAL
EOSINOPHIL # BLD AUTO: 0.1 K/UL (ref 0–0.5)
EOSINOPHIL NFR BLD: 1.1 % (ref 0–8)
ERYTHROCYTE [DISTWIDTH] IN BLOOD BY AUTOMATED COUNT: 11.9 % (ref 11.5–14.5)
EST. GFR  (AFRICAN AMERICAN): >60 ML/MIN/1.73 M^2
EST. GFR  (NON AFRICAN AMERICAN): >60 ML/MIN/1.73 M^2
GLUCOSE SERPL-MCNC: 154 MG/DL (ref 70–110)
HCT VFR BLD AUTO: 40.6 % (ref 40–54)
HGB BLD-MCNC: 14.3 G/DL (ref 14–18)
IMM GRANULOCYTES # BLD AUTO: 0.04 K/UL (ref 0–0.04)
IMM GRANULOCYTES NFR BLD AUTO: 0.3 % (ref 0–0.5)
LYMPHOCYTES # BLD AUTO: 2.5 K/UL (ref 1–4.8)
LYMPHOCYTES NFR BLD: 21.7 % (ref 18–48)
MCH RBC QN AUTO: 33.3 PG (ref 27–31)
MCHC RBC AUTO-ENTMCNC: 35.2 G/DL (ref 32–36)
MCV RBC AUTO: 94 FL (ref 82–98)
MONOCYTES # BLD AUTO: 0.6 K/UL (ref 0.3–1)
MONOCYTES NFR BLD: 4.8 % (ref 4–15)
NEUTROPHILS # BLD AUTO: 8.4 K/UL (ref 1.8–7.7)
NEUTROPHILS NFR BLD: 71.7 % (ref 38–73)
NRBC BLD-RTO: 0 /100 WBC
PLATELET # BLD AUTO: 208 K/UL (ref 150–450)
PMV BLD AUTO: 9.7 FL (ref 9.2–12.9)
POTASSIUM SERPL-SCNC: 4 MMOL/L (ref 3.5–5.1)
PROT SERPL-MCNC: 7.6 G/DL (ref 6–8.4)
RBC # BLD AUTO: 4.3 M/UL (ref 4.6–6.2)
SODIUM SERPL-SCNC: 141 MMOL/L (ref 136–145)
WBC # BLD AUTO: 11.72 K/UL (ref 3.9–12.7)

## 2021-07-14 PROCEDURE — 80053 COMPREHEN METABOLIC PANEL: CPT | Mod: PN | Performed by: INTERNAL MEDICINE

## 2021-07-14 PROCEDURE — 36415 COLL VENOUS BLD VENIPUNCTURE: CPT | Mod: PN | Performed by: INTERNAL MEDICINE

## 2021-07-14 PROCEDURE — 85025 COMPLETE CBC W/AUTO DIFF WBC: CPT | Mod: PN | Performed by: INTERNAL MEDICINE

## 2021-07-14 PROCEDURE — 82550 ASSAY OF CK (CPK): CPT | Mod: PN | Performed by: INTERNAL MEDICINE

## 2021-07-16 ENCOUNTER — SPECIALTY PHARMACY (OUTPATIENT)
Dept: PHARMACY | Facility: CLINIC | Age: 66
End: 2021-07-16

## 2021-07-21 ENCOUNTER — SPECIALTY PHARMACY (OUTPATIENT)
Dept: PHARMACY | Facility: CLINIC | Age: 66
End: 2021-07-21

## 2021-08-13 ENCOUNTER — SPECIALTY PHARMACY (OUTPATIENT)
Dept: PHARMACY | Facility: CLINIC | Age: 66
End: 2021-08-13

## 2021-08-13 DIAGNOSIS — C44.311 BASAL CELL CARCINOMA OF NOSE: ICD-10-CM

## 2021-09-02 ENCOUNTER — LAB VISIT (OUTPATIENT)
Dept: LAB | Facility: HOSPITAL | Age: 66
End: 2021-09-02
Payer: MEDICARE

## 2021-09-02 ENCOUNTER — OFFICE VISIT (OUTPATIENT)
Dept: HEMATOLOGY/ONCOLOGY | Facility: CLINIC | Age: 66
End: 2021-09-02
Payer: MEDICARE

## 2021-09-02 VITALS
HEIGHT: 71 IN | TEMPERATURE: 98 F | BODY MASS INDEX: 27.54 KG/M2 | OXYGEN SATURATION: 97 % | WEIGHT: 196.75 LBS | DIASTOLIC BLOOD PRESSURE: 78 MMHG | HEART RATE: 77 BPM | SYSTOLIC BLOOD PRESSURE: 124 MMHG | RESPIRATION RATE: 18 BRPM

## 2021-09-02 DIAGNOSIS — C44.311 BASAL CELL CARCINOMA OF NOSE: ICD-10-CM

## 2021-09-02 DIAGNOSIS — R74.8 ELEVATED CK: ICD-10-CM

## 2021-09-02 DIAGNOSIS — I10 ESSENTIAL HYPERTENSION: ICD-10-CM

## 2021-09-02 DIAGNOSIS — C44.311 BASAL CELL CARCINOMA OF NOSE: Primary | ICD-10-CM

## 2021-09-02 DIAGNOSIS — E11.49 TYPE 2 DIABETES MELLITUS WITH NEUROLOGICAL COMPLICATIONS: ICD-10-CM

## 2021-09-02 LAB
ALBUMIN SERPL BCP-MCNC: 4.3 G/DL (ref 3.5–5.2)
ALP SERPL-CCNC: 60 U/L (ref 55–135)
ALT SERPL W/O P-5'-P-CCNC: 44 U/L (ref 10–44)
ANION GAP SERPL CALC-SCNC: 8 MMOL/L (ref 8–16)
AST SERPL-CCNC: 30 U/L (ref 10–40)
BASOPHILS # BLD AUTO: 0.03 K/UL (ref 0–0.2)
BASOPHILS NFR BLD: 0.3 % (ref 0–1.9)
BILIRUB SERPL-MCNC: 0.7 MG/DL (ref 0.1–1)
BUN SERPL-MCNC: 20 MG/DL (ref 8–23)
CALCIUM SERPL-MCNC: 9.9 MG/DL (ref 8.7–10.5)
CHLORIDE SERPL-SCNC: 102 MMOL/L (ref 95–110)
CK SERPL-CCNC: 465 U/L (ref 20–200)
CO2 SERPL-SCNC: 27 MMOL/L (ref 23–29)
CREAT SERPL-MCNC: 1.2 MG/DL (ref 0.5–1.4)
DIFFERENTIAL METHOD: ABNORMAL
EOSINOPHIL # BLD AUTO: 0.1 K/UL (ref 0–0.5)
EOSINOPHIL NFR BLD: 0.8 % (ref 0–8)
ERYTHROCYTE [DISTWIDTH] IN BLOOD BY AUTOMATED COUNT: 11.8 % (ref 11.5–14.5)
EST. GFR  (AFRICAN AMERICAN): >60 ML/MIN/1.73 M^2
EST. GFR  (NON AFRICAN AMERICAN): >60 ML/MIN/1.73 M^2
GLUCOSE SERPL-MCNC: 141 MG/DL (ref 70–110)
HCT VFR BLD AUTO: 39.4 % (ref 40–54)
HGB BLD-MCNC: 13.9 G/DL (ref 14–18)
IMM GRANULOCYTES # BLD AUTO: 0.01 K/UL (ref 0–0.04)
IMM GRANULOCYTES NFR BLD AUTO: 0.1 % (ref 0–0.5)
LYMPHOCYTES # BLD AUTO: 3 K/UL (ref 1–4.8)
LYMPHOCYTES NFR BLD: 34.6 % (ref 18–48)
MCH RBC QN AUTO: 33.3 PG (ref 27–31)
MCHC RBC AUTO-ENTMCNC: 35.3 G/DL (ref 32–36)
MCV RBC AUTO: 94 FL (ref 82–98)
MONOCYTES # BLD AUTO: 0.5 K/UL (ref 0.3–1)
MONOCYTES NFR BLD: 5.5 % (ref 4–15)
NEUTROPHILS # BLD AUTO: 5.1 K/UL (ref 1.8–7.7)
NEUTROPHILS NFR BLD: 58.7 % (ref 38–73)
NRBC BLD-RTO: 0 /100 WBC
PLATELET # BLD AUTO: 198 K/UL (ref 150–450)
PMV BLD AUTO: 9.5 FL (ref 9.2–12.9)
POTASSIUM SERPL-SCNC: 4.1 MMOL/L (ref 3.5–5.1)
PROT SERPL-MCNC: 7.4 G/DL (ref 6–8.4)
RBC # BLD AUTO: 4.18 M/UL (ref 4.6–6.2)
SODIUM SERPL-SCNC: 137 MMOL/L (ref 136–145)
WBC # BLD AUTO: 8.68 K/UL (ref 3.9–12.7)

## 2021-09-02 PROCEDURE — 1101F PT FALLS ASSESS-DOCD LE1/YR: CPT | Mod: CPTII,S$GLB,, | Performed by: INTERNAL MEDICINE

## 2021-09-02 PROCEDURE — 3288F FALL RISK ASSESSMENT DOCD: CPT | Mod: CPTII,S$GLB,, | Performed by: INTERNAL MEDICINE

## 2021-09-02 PROCEDURE — 3008F PR BODY MASS INDEX (BMI) DOCUMENTED: ICD-10-PCS | Mod: CPTII,S$GLB,, | Performed by: INTERNAL MEDICINE

## 2021-09-02 PROCEDURE — 99214 PR OFFICE/OUTPT VISIT, EST, LEVL IV, 30-39 MIN: ICD-10-PCS | Mod: S$GLB,,, | Performed by: INTERNAL MEDICINE

## 2021-09-02 PROCEDURE — 3044F PR MOST RECENT HEMOGLOBIN A1C LEVEL <7.0%: ICD-10-PCS | Mod: CPTII,S$GLB,, | Performed by: INTERNAL MEDICINE

## 2021-09-02 PROCEDURE — 3078F PR MOST RECENT DIASTOLIC BLOOD PRESSURE < 80 MM HG: ICD-10-PCS | Mod: CPTII,S$GLB,, | Performed by: INTERNAL MEDICINE

## 2021-09-02 PROCEDURE — 1101F PR PT FALLS ASSESS DOC 0-1 FALLS W/OUT INJ PAST YR: ICD-10-PCS | Mod: CPTII,S$GLB,, | Performed by: INTERNAL MEDICINE

## 2021-09-02 PROCEDURE — 99214 OFFICE O/P EST MOD 30 MIN: CPT | Mod: S$GLB,,, | Performed by: INTERNAL MEDICINE

## 2021-09-02 PROCEDURE — 99999 PR PBB SHADOW E&M-EST. PATIENT-LVL III: ICD-10-PCS | Mod: PBBFAC,,, | Performed by: INTERNAL MEDICINE

## 2021-09-02 PROCEDURE — 1126F AMNT PAIN NOTED NONE PRSNT: CPT | Mod: CPTII,S$GLB,, | Performed by: INTERNAL MEDICINE

## 2021-09-02 PROCEDURE — 3074F SYST BP LT 130 MM HG: CPT | Mod: CPTII,S$GLB,, | Performed by: INTERNAL MEDICINE

## 2021-09-02 PROCEDURE — 3008F BODY MASS INDEX DOCD: CPT | Mod: CPTII,S$GLB,, | Performed by: INTERNAL MEDICINE

## 2021-09-02 PROCEDURE — 1159F MED LIST DOCD IN RCRD: CPT | Mod: CPTII,S$GLB,, | Performed by: INTERNAL MEDICINE

## 2021-09-02 PROCEDURE — 1126F PR PAIN SEVERITY QUANTIFIED, NO PAIN PRESENT: ICD-10-PCS | Mod: CPTII,S$GLB,, | Performed by: INTERNAL MEDICINE

## 2021-09-02 PROCEDURE — 3044F HG A1C LEVEL LT 7.0%: CPT | Mod: CPTII,S$GLB,, | Performed by: INTERNAL MEDICINE

## 2021-09-02 PROCEDURE — 82550 ASSAY OF CK (CPK): CPT | Mod: PN | Performed by: INTERNAL MEDICINE

## 2021-09-02 PROCEDURE — 3078F DIAST BP <80 MM HG: CPT | Mod: CPTII,S$GLB,, | Performed by: INTERNAL MEDICINE

## 2021-09-02 PROCEDURE — 1159F PR MEDICATION LIST DOCUMENTED IN MEDICAL RECORD: ICD-10-PCS | Mod: CPTII,S$GLB,, | Performed by: INTERNAL MEDICINE

## 2021-09-02 PROCEDURE — 36415 COLL VENOUS BLD VENIPUNCTURE: CPT | Mod: PN | Performed by: INTERNAL MEDICINE

## 2021-09-02 PROCEDURE — 3288F PR FALLS RISK ASSESSMENT DOCUMENTED: ICD-10-PCS | Mod: CPTII,S$GLB,, | Performed by: INTERNAL MEDICINE

## 2021-09-02 PROCEDURE — 80053 COMPREHEN METABOLIC PANEL: CPT | Mod: PN | Performed by: INTERNAL MEDICINE

## 2021-09-02 PROCEDURE — 85025 COMPLETE CBC W/AUTO DIFF WBC: CPT | Mod: PN | Performed by: INTERNAL MEDICINE

## 2021-09-02 PROCEDURE — 99999 PR PBB SHADOW E&M-EST. PATIENT-LVL III: CPT | Mod: PBBFAC,,, | Performed by: INTERNAL MEDICINE

## 2021-09-02 PROCEDURE — 3074F PR MOST RECENT SYSTOLIC BLOOD PRESSURE < 130 MM HG: ICD-10-PCS | Mod: CPTII,S$GLB,, | Performed by: INTERNAL MEDICINE

## 2021-09-02 RX ORDER — ATORVASTATIN CALCIUM 40 MG/1
40 TABLET, FILM COATED ORAL DAILY
COMMUNITY
Start: 2021-07-15 | End: 2022-11-02

## 2021-09-14 ENCOUNTER — TELEPHONE (OUTPATIENT)
Dept: INFUSION THERAPY | Facility: HOSPITAL | Age: 66
End: 2021-09-14
Payer: MEDICARE

## 2021-09-14 ENCOUNTER — TELEPHONE (OUTPATIENT)
Dept: INFUSION THERAPY | Facility: HOSPITAL | Age: 66
End: 2021-09-14

## 2021-09-17 ENCOUNTER — SPECIALTY PHARMACY (OUTPATIENT)
Dept: PHARMACY | Facility: CLINIC | Age: 66
End: 2021-09-17

## 2021-09-23 ENCOUNTER — SPECIALTY PHARMACY (OUTPATIENT)
Dept: PHARMACY | Facility: CLINIC | Age: 66
End: 2021-09-23

## 2021-09-28 ENCOUNTER — TELEPHONE (OUTPATIENT)
Dept: HEMATOLOGY/ONCOLOGY | Facility: CLINIC | Age: 66
End: 2021-09-28

## 2021-10-06 ENCOUNTER — LAB VISIT (OUTPATIENT)
Dept: LAB | Facility: HOSPITAL | Age: 66
End: 2021-10-06
Attending: INTERNAL MEDICINE
Payer: MEDICARE

## 2021-10-06 ENCOUNTER — OFFICE VISIT (OUTPATIENT)
Dept: HEMATOLOGY/ONCOLOGY | Facility: CLINIC | Age: 66
End: 2021-10-06
Payer: MEDICARE

## 2021-10-06 VITALS
BODY MASS INDEX: 28.05 KG/M2 | OXYGEN SATURATION: 97 % | HEIGHT: 71 IN | DIASTOLIC BLOOD PRESSURE: 70 MMHG | WEIGHT: 200.38 LBS | HEART RATE: 66 BPM | TEMPERATURE: 98 F | SYSTOLIC BLOOD PRESSURE: 158 MMHG

## 2021-10-06 DIAGNOSIS — C44.311 BASAL CELL CARCINOMA OF NOSE: Primary | ICD-10-CM

## 2021-10-06 DIAGNOSIS — R74.8 ELEVATED CK: ICD-10-CM

## 2021-10-06 DIAGNOSIS — E11.49 TYPE 2 DIABETES MELLITUS WITH NEUROLOGICAL COMPLICATIONS: ICD-10-CM

## 2021-10-06 DIAGNOSIS — I10 ESSENTIAL HYPERTENSION: ICD-10-CM

## 2021-10-06 DIAGNOSIS — C44.311 BASAL CELL CARCINOMA OF NOSE: ICD-10-CM

## 2021-10-06 LAB
ALBUMIN SERPL BCP-MCNC: 4.1 G/DL (ref 3.5–5.2)
ALP SERPL-CCNC: 69 U/L (ref 55–135)
ALT SERPL W/O P-5'-P-CCNC: 32 U/L (ref 10–44)
ANION GAP SERPL CALC-SCNC: 7 MMOL/L (ref 8–16)
AST SERPL-CCNC: 20 U/L (ref 10–40)
BASOPHILS # BLD AUTO: 0.03 K/UL (ref 0–0.2)
BASOPHILS NFR BLD: 0.4 % (ref 0–1.9)
BILIRUB SERPL-MCNC: 0.4 MG/DL (ref 0.1–1)
BUN SERPL-MCNC: 16 MG/DL (ref 8–23)
CALCIUM SERPL-MCNC: 10.3 MG/DL (ref 8.7–10.5)
CHLORIDE SERPL-SCNC: 107 MMOL/L (ref 95–110)
CK SERPL-CCNC: 351 U/L (ref 20–200)
CO2 SERPL-SCNC: 27 MMOL/L (ref 23–29)
CREAT SERPL-MCNC: 1.1 MG/DL (ref 0.5–1.4)
DIFFERENTIAL METHOD: ABNORMAL
EOSINOPHIL # BLD AUTO: 0.1 K/UL (ref 0–0.5)
EOSINOPHIL NFR BLD: 1.5 % (ref 0–8)
ERYTHROCYTE [DISTWIDTH] IN BLOOD BY AUTOMATED COUNT: 11.8 % (ref 11.5–14.5)
EST. GFR  (AFRICAN AMERICAN): >60 ML/MIN/1.73 M^2
EST. GFR  (NON AFRICAN AMERICAN): >60 ML/MIN/1.73 M^2
GLUCOSE SERPL-MCNC: 153 MG/DL (ref 70–110)
HCT VFR BLD AUTO: 41.7 % (ref 40–54)
HGB BLD-MCNC: 14.2 G/DL (ref 14–18)
IMM GRANULOCYTES # BLD AUTO: 0.04 K/UL (ref 0–0.04)
IMM GRANULOCYTES NFR BLD AUTO: 0.6 % (ref 0–0.5)
LYMPHOCYTES # BLD AUTO: 2.5 K/UL (ref 1–4.8)
LYMPHOCYTES NFR BLD: 37.4 % (ref 18–48)
MCH RBC QN AUTO: 32.5 PG (ref 27–31)
MCHC RBC AUTO-ENTMCNC: 34.1 G/DL (ref 32–36)
MCV RBC AUTO: 95 FL (ref 82–98)
MONOCYTES # BLD AUTO: 0.3 K/UL (ref 0.3–1)
MONOCYTES NFR BLD: 5 % (ref 4–15)
NEUTROPHILS # BLD AUTO: 3.7 K/UL (ref 1.8–7.7)
NEUTROPHILS NFR BLD: 55.1 % (ref 38–73)
NRBC BLD-RTO: 0 /100 WBC
PLATELET # BLD AUTO: 211 K/UL (ref 150–450)
PMV BLD AUTO: 9.6 FL (ref 9.2–12.9)
POTASSIUM SERPL-SCNC: 4.4 MMOL/L (ref 3.5–5.1)
PROT SERPL-MCNC: 7.3 G/DL (ref 6–8.4)
RBC # BLD AUTO: 4.37 M/UL (ref 4.6–6.2)
SODIUM SERPL-SCNC: 141 MMOL/L (ref 136–145)
WBC # BLD AUTO: 6.77 K/UL (ref 3.9–12.7)

## 2021-10-06 PROCEDURE — 3008F PR BODY MASS INDEX (BMI) DOCUMENTED: ICD-10-PCS | Mod: CPTII,S$GLB,, | Performed by: INTERNAL MEDICINE

## 2021-10-06 PROCEDURE — 3044F HG A1C LEVEL LT 7.0%: CPT | Mod: CPTII,S$GLB,, | Performed by: INTERNAL MEDICINE

## 2021-10-06 PROCEDURE — 3077F PR MOST RECENT SYSTOLIC BLOOD PRESSURE >= 140 MM HG: ICD-10-PCS | Mod: CPTII,S$GLB,, | Performed by: INTERNAL MEDICINE

## 2021-10-06 PROCEDURE — 99214 OFFICE O/P EST MOD 30 MIN: CPT | Mod: S$GLB,,, | Performed by: INTERNAL MEDICINE

## 2021-10-06 PROCEDURE — 80053 COMPREHEN METABOLIC PANEL: CPT | Mod: PN | Performed by: INTERNAL MEDICINE

## 2021-10-06 PROCEDURE — 1126F AMNT PAIN NOTED NONE PRSNT: CPT | Mod: CPTII,S$GLB,, | Performed by: INTERNAL MEDICINE

## 2021-10-06 PROCEDURE — 1126F PR PAIN SEVERITY QUANTIFIED, NO PAIN PRESENT: ICD-10-PCS | Mod: CPTII,S$GLB,, | Performed by: INTERNAL MEDICINE

## 2021-10-06 PROCEDURE — 85025 COMPLETE CBC W/AUTO DIFF WBC: CPT | Mod: PN | Performed by: INTERNAL MEDICINE

## 2021-10-06 PROCEDURE — 3078F PR MOST RECENT DIASTOLIC BLOOD PRESSURE < 80 MM HG: ICD-10-PCS | Mod: CPTII,S$GLB,, | Performed by: INTERNAL MEDICINE

## 2021-10-06 PROCEDURE — 1159F MED LIST DOCD IN RCRD: CPT | Mod: CPTII,S$GLB,, | Performed by: INTERNAL MEDICINE

## 2021-10-06 PROCEDURE — 99999 PR PBB SHADOW E&M-EST. PATIENT-LVL III: CPT | Mod: PBBFAC,,, | Performed by: INTERNAL MEDICINE

## 2021-10-06 PROCEDURE — 3077F SYST BP >= 140 MM HG: CPT | Mod: CPTII,S$GLB,, | Performed by: INTERNAL MEDICINE

## 2021-10-06 PROCEDURE — 3078F DIAST BP <80 MM HG: CPT | Mod: CPTII,S$GLB,, | Performed by: INTERNAL MEDICINE

## 2021-10-06 PROCEDURE — 1159F PR MEDICATION LIST DOCUMENTED IN MEDICAL RECORD: ICD-10-PCS | Mod: CPTII,S$GLB,, | Performed by: INTERNAL MEDICINE

## 2021-10-06 PROCEDURE — 3008F BODY MASS INDEX DOCD: CPT | Mod: CPTII,S$GLB,, | Performed by: INTERNAL MEDICINE

## 2021-10-06 PROCEDURE — 3044F PR MOST RECENT HEMOGLOBIN A1C LEVEL <7.0%: ICD-10-PCS | Mod: CPTII,S$GLB,, | Performed by: INTERNAL MEDICINE

## 2021-10-06 PROCEDURE — 36415 COLL VENOUS BLD VENIPUNCTURE: CPT | Mod: PN | Performed by: INTERNAL MEDICINE

## 2021-10-06 PROCEDURE — 99214 PR OFFICE/OUTPT VISIT, EST, LEVL IV, 30-39 MIN: ICD-10-PCS | Mod: S$GLB,,, | Performed by: INTERNAL MEDICINE

## 2021-10-06 PROCEDURE — 99999 PR PBB SHADOW E&M-EST. PATIENT-LVL III: ICD-10-PCS | Mod: PBBFAC,,, | Performed by: INTERNAL MEDICINE

## 2021-10-06 PROCEDURE — 82550 ASSAY OF CK (CPK): CPT | Mod: PN | Performed by: INTERNAL MEDICINE

## 2021-10-19 ENCOUNTER — OFFICE VISIT (OUTPATIENT)
Dept: HEMATOLOGY/ONCOLOGY | Facility: CLINIC | Age: 66
End: 2021-10-19
Payer: MEDICARE

## 2021-10-19 VITALS
RESPIRATION RATE: 18 BRPM | WEIGHT: 202.81 LBS | DIASTOLIC BLOOD PRESSURE: 78 MMHG | SYSTOLIC BLOOD PRESSURE: 175 MMHG | HEIGHT: 71 IN | TEMPERATURE: 98 F | OXYGEN SATURATION: 98 % | BODY MASS INDEX: 28.39 KG/M2

## 2021-10-19 DIAGNOSIS — C44.311 BASAL CELL CARCINOMA OF NOSE: Primary | ICD-10-CM

## 2021-10-19 PROCEDURE — 3077F SYST BP >= 140 MM HG: CPT | Mod: CPTII,S$GLB,, | Performed by: OTOLARYNGOLOGY

## 2021-10-19 PROCEDURE — 99213 PR OFFICE/OUTPT VISIT, EST, LEVL III, 20-29 MIN: ICD-10-PCS | Mod: S$GLB,,, | Performed by: OTOLARYNGOLOGY

## 2021-10-19 PROCEDURE — 3078F DIAST BP <80 MM HG: CPT | Mod: CPTII,S$GLB,, | Performed by: OTOLARYNGOLOGY

## 2021-10-19 PROCEDURE — 3288F FALL RISK ASSESSMENT DOCD: CPT | Mod: CPTII,S$GLB,, | Performed by: OTOLARYNGOLOGY

## 2021-10-19 PROCEDURE — 1159F PR MEDICATION LIST DOCUMENTED IN MEDICAL RECORD: ICD-10-PCS | Mod: CPTII,S$GLB,, | Performed by: OTOLARYNGOLOGY

## 2021-10-19 PROCEDURE — 1101F PT FALLS ASSESS-DOCD LE1/YR: CPT | Mod: CPTII,S$GLB,, | Performed by: OTOLARYNGOLOGY

## 2021-10-19 PROCEDURE — 3078F PR MOST RECENT DIASTOLIC BLOOD PRESSURE < 80 MM HG: ICD-10-PCS | Mod: CPTII,S$GLB,, | Performed by: OTOLARYNGOLOGY

## 2021-10-19 PROCEDURE — 3044F PR MOST RECENT HEMOGLOBIN A1C LEVEL <7.0%: ICD-10-PCS | Mod: CPTII,S$GLB,, | Performed by: OTOLARYNGOLOGY

## 2021-10-19 PROCEDURE — 3044F HG A1C LEVEL LT 7.0%: CPT | Mod: CPTII,S$GLB,, | Performed by: OTOLARYNGOLOGY

## 2021-10-19 PROCEDURE — 1101F PR PT FALLS ASSESS DOC 0-1 FALLS W/OUT INJ PAST YR: ICD-10-PCS | Mod: CPTII,S$GLB,, | Performed by: OTOLARYNGOLOGY

## 2021-10-19 PROCEDURE — 3008F PR BODY MASS INDEX (BMI) DOCUMENTED: ICD-10-PCS | Mod: CPTII,S$GLB,, | Performed by: OTOLARYNGOLOGY

## 2021-10-19 PROCEDURE — 1160F PR REVIEW ALL MEDS BY PRESCRIBER/CLIN PHARMACIST DOCUMENTED: ICD-10-PCS | Mod: CPTII,S$GLB,, | Performed by: OTOLARYNGOLOGY

## 2021-10-19 PROCEDURE — 3288F PR FALLS RISK ASSESSMENT DOCUMENTED: ICD-10-PCS | Mod: CPTII,S$GLB,, | Performed by: OTOLARYNGOLOGY

## 2021-10-19 PROCEDURE — 3077F PR MOST RECENT SYSTOLIC BLOOD PRESSURE >= 140 MM HG: ICD-10-PCS | Mod: CPTII,S$GLB,, | Performed by: OTOLARYNGOLOGY

## 2021-10-19 PROCEDURE — 99999 PR PBB SHADOW E&M-EST. PATIENT-LVL III: ICD-10-PCS | Mod: PBBFAC,,, | Performed by: OTOLARYNGOLOGY

## 2021-10-19 PROCEDURE — 3008F BODY MASS INDEX DOCD: CPT | Mod: CPTII,S$GLB,, | Performed by: OTOLARYNGOLOGY

## 2021-10-19 PROCEDURE — 1160F RVW MEDS BY RX/DR IN RCRD: CPT | Mod: CPTII,S$GLB,, | Performed by: OTOLARYNGOLOGY

## 2021-10-19 PROCEDURE — 1159F MED LIST DOCD IN RCRD: CPT | Mod: CPTII,S$GLB,, | Performed by: OTOLARYNGOLOGY

## 2021-10-19 PROCEDURE — 99213 OFFICE O/P EST LOW 20 MIN: CPT | Mod: S$GLB,,, | Performed by: OTOLARYNGOLOGY

## 2021-10-19 PROCEDURE — 99999 PR PBB SHADOW E&M-EST. PATIENT-LVL III: CPT | Mod: PBBFAC,,, | Performed by: OTOLARYNGOLOGY

## 2021-10-19 PROCEDURE — 1126F AMNT PAIN NOTED NONE PRSNT: CPT | Mod: CPTII,S$GLB,, | Performed by: OTOLARYNGOLOGY

## 2021-10-19 PROCEDURE — 1126F PR PAIN SEVERITY QUANTIFIED, NO PAIN PRESENT: ICD-10-PCS | Mod: CPTII,S$GLB,, | Performed by: OTOLARYNGOLOGY

## 2021-10-21 ENCOUNTER — SPECIALTY PHARMACY (OUTPATIENT)
Dept: PHARMACY | Facility: CLINIC | Age: 66
End: 2021-10-21

## 2021-10-21 ENCOUNTER — TELEPHONE (OUTPATIENT)
Dept: HEMATOLOGY/ONCOLOGY | Facility: CLINIC | Age: 66
End: 2021-10-21

## 2021-10-21 DIAGNOSIS — C44.311 BASAL CELL CARCINOMA OF NOSE: ICD-10-CM

## 2021-10-22 ENCOUNTER — SPECIALTY PHARMACY (OUTPATIENT)
Dept: PHARMACY | Facility: CLINIC | Age: 66
End: 2021-10-22

## 2021-10-27 ENCOUNTER — TELEPHONE (OUTPATIENT)
Dept: HEMATOLOGY/ONCOLOGY | Facility: CLINIC | Age: 66
End: 2021-10-27
Payer: MEDICARE

## 2021-11-04 ENCOUNTER — OFFICE VISIT (OUTPATIENT)
Dept: HEMATOLOGY/ONCOLOGY | Facility: CLINIC | Age: 66
End: 2021-11-04
Payer: MEDICARE

## 2021-11-04 ENCOUNTER — LAB VISIT (OUTPATIENT)
Dept: LAB | Facility: HOSPITAL | Age: 66
End: 2021-11-04
Attending: INTERNAL MEDICINE
Payer: MEDICARE

## 2021-11-04 VITALS
BODY MASS INDEX: 27.78 KG/M2 | DIASTOLIC BLOOD PRESSURE: 70 MMHG | WEIGHT: 198.44 LBS | OXYGEN SATURATION: 99 % | HEIGHT: 71 IN | TEMPERATURE: 98 F | SYSTOLIC BLOOD PRESSURE: 148 MMHG | HEART RATE: 80 BPM

## 2021-11-04 DIAGNOSIS — R74.8 ELEVATED CK: ICD-10-CM

## 2021-11-04 DIAGNOSIS — C44.311 BASAL CELL CARCINOMA OF NOSE: ICD-10-CM

## 2021-11-04 DIAGNOSIS — C44.311 BASAL CELL CARCINOMA OF NOSE: Primary | ICD-10-CM

## 2021-11-04 DIAGNOSIS — E78.5 HYPERLIPIDEMIA, UNSPECIFIED HYPERLIPIDEMIA TYPE: ICD-10-CM

## 2021-11-04 DIAGNOSIS — E11.49 TYPE 2 DIABETES MELLITUS WITH NEUROLOGICAL COMPLICATIONS: ICD-10-CM

## 2021-11-04 LAB
ALBUMIN SERPL BCP-MCNC: 4.3 G/DL (ref 3.5–5.2)
ALP SERPL-CCNC: 72 U/L (ref 55–135)
ALT SERPL W/O P-5'-P-CCNC: 35 U/L (ref 10–44)
ANION GAP SERPL CALC-SCNC: 8 MMOL/L (ref 8–16)
AST SERPL-CCNC: 20 U/L (ref 10–40)
BASOPHILS # BLD AUTO: 0.03 K/UL (ref 0–0.2)
BASOPHILS NFR BLD: 0.4 % (ref 0–1.9)
BILIRUB SERPL-MCNC: 0.6 MG/DL (ref 0.1–1)
BUN SERPL-MCNC: 15 MG/DL (ref 8–23)
CALCIUM SERPL-MCNC: 10.6 MG/DL (ref 8.7–10.5)
CHLORIDE SERPL-SCNC: 106 MMOL/L (ref 95–110)
CK SERPL-CCNC: 249 U/L (ref 20–200)
CO2 SERPL-SCNC: 27 MMOL/L (ref 23–29)
CREAT SERPL-MCNC: 1 MG/DL (ref 0.5–1.4)
DIFFERENTIAL METHOD: ABNORMAL
EOSINOPHIL # BLD AUTO: 0.1 K/UL (ref 0–0.5)
EOSINOPHIL NFR BLD: 1.1 % (ref 0–8)
ERYTHROCYTE [DISTWIDTH] IN BLOOD BY AUTOMATED COUNT: 12.1 % (ref 11.5–14.5)
EST. GFR  (AFRICAN AMERICAN): >60 ML/MIN/1.73 M^2
EST. GFR  (NON AFRICAN AMERICAN): >60 ML/MIN/1.73 M^2
GLUCOSE SERPL-MCNC: 131 MG/DL (ref 70–110)
HCT VFR BLD AUTO: 42.3 % (ref 40–54)
HGB BLD-MCNC: 14.8 G/DL (ref 14–18)
IMM GRANULOCYTES # BLD AUTO: 0.03 K/UL (ref 0–0.04)
IMM GRANULOCYTES NFR BLD AUTO: 0.4 % (ref 0–0.5)
LYMPHOCYTES # BLD AUTO: 2.6 K/UL (ref 1–4.8)
LYMPHOCYTES NFR BLD: 34.6 % (ref 18–48)
MCH RBC QN AUTO: 33 PG (ref 27–31)
MCHC RBC AUTO-ENTMCNC: 35 G/DL (ref 32–36)
MCV RBC AUTO: 94 FL (ref 82–98)
MONOCYTES # BLD AUTO: 0.4 K/UL (ref 0.3–1)
MONOCYTES NFR BLD: 5.7 % (ref 4–15)
NEUTROPHILS # BLD AUTO: 4.3 K/UL (ref 1.8–7.7)
NEUTROPHILS NFR BLD: 57.8 % (ref 38–73)
NRBC BLD-RTO: 0 /100 WBC
PLATELET # BLD AUTO: 216 K/UL (ref 150–450)
PMV BLD AUTO: 9.3 FL (ref 9.2–12.9)
POTASSIUM SERPL-SCNC: 4.3 MMOL/L (ref 3.5–5.1)
PROT SERPL-MCNC: 7.6 G/DL (ref 6–8.4)
RBC # BLD AUTO: 4.48 M/UL (ref 4.6–6.2)
SODIUM SERPL-SCNC: 141 MMOL/L (ref 136–145)
WBC # BLD AUTO: 7.49 K/UL (ref 3.9–12.7)

## 2021-11-04 PROCEDURE — 3288F FALL RISK ASSESSMENT DOCD: CPT | Mod: CPTII,S$GLB,, | Performed by: INTERNAL MEDICINE

## 2021-11-04 PROCEDURE — 1101F PT FALLS ASSESS-DOCD LE1/YR: CPT | Mod: CPTII,S$GLB,, | Performed by: INTERNAL MEDICINE

## 2021-11-04 PROCEDURE — 82550 ASSAY OF CK (CPK): CPT | Mod: PN | Performed by: INTERNAL MEDICINE

## 2021-11-04 PROCEDURE — 3044F PR MOST RECENT HEMOGLOBIN A1C LEVEL <7.0%: ICD-10-PCS | Mod: CPTII,S$GLB,, | Performed by: INTERNAL MEDICINE

## 2021-11-04 PROCEDURE — 3078F DIAST BP <80 MM HG: CPT | Mod: CPTII,S$GLB,, | Performed by: INTERNAL MEDICINE

## 2021-11-04 PROCEDURE — 1126F PR PAIN SEVERITY QUANTIFIED, NO PAIN PRESENT: ICD-10-PCS | Mod: CPTII,S$GLB,, | Performed by: INTERNAL MEDICINE

## 2021-11-04 PROCEDURE — 99213 PR OFFICE/OUTPT VISIT, EST, LEVL III, 20-29 MIN: ICD-10-PCS | Mod: S$GLB,,, | Performed by: INTERNAL MEDICINE

## 2021-11-04 PROCEDURE — 99213 OFFICE O/P EST LOW 20 MIN: CPT | Mod: S$GLB,,, | Performed by: INTERNAL MEDICINE

## 2021-11-04 PROCEDURE — 99999 PR PBB SHADOW E&M-EST. PATIENT-LVL III: CPT | Mod: PBBFAC,,, | Performed by: INTERNAL MEDICINE

## 2021-11-04 PROCEDURE — 99999 PR PBB SHADOW E&M-EST. PATIENT-LVL III: ICD-10-PCS | Mod: PBBFAC,,, | Performed by: INTERNAL MEDICINE

## 2021-11-04 PROCEDURE — 1159F MED LIST DOCD IN RCRD: CPT | Mod: CPTII,S$GLB,, | Performed by: INTERNAL MEDICINE

## 2021-11-04 PROCEDURE — 3008F BODY MASS INDEX DOCD: CPT | Mod: CPTII,S$GLB,, | Performed by: INTERNAL MEDICINE

## 2021-11-04 PROCEDURE — 36415 COLL VENOUS BLD VENIPUNCTURE: CPT | Mod: PN | Performed by: INTERNAL MEDICINE

## 2021-11-04 PROCEDURE — 3288F PR FALLS RISK ASSESSMENT DOCUMENTED: ICD-10-PCS | Mod: CPTII,S$GLB,, | Performed by: INTERNAL MEDICINE

## 2021-11-04 PROCEDURE — 1101F PR PT FALLS ASSESS DOC 0-1 FALLS W/OUT INJ PAST YR: ICD-10-PCS | Mod: CPTII,S$GLB,, | Performed by: INTERNAL MEDICINE

## 2021-11-04 PROCEDURE — 3078F PR MOST RECENT DIASTOLIC BLOOD PRESSURE < 80 MM HG: ICD-10-PCS | Mod: CPTII,S$GLB,, | Performed by: INTERNAL MEDICINE

## 2021-11-04 PROCEDURE — 80053 COMPREHEN METABOLIC PANEL: CPT | Mod: PN | Performed by: INTERNAL MEDICINE

## 2021-11-04 PROCEDURE — 3008F PR BODY MASS INDEX (BMI) DOCUMENTED: ICD-10-PCS | Mod: CPTII,S$GLB,, | Performed by: INTERNAL MEDICINE

## 2021-11-04 PROCEDURE — 85025 COMPLETE CBC W/AUTO DIFF WBC: CPT | Mod: PN | Performed by: INTERNAL MEDICINE

## 2021-11-04 PROCEDURE — 1159F PR MEDICATION LIST DOCUMENTED IN MEDICAL RECORD: ICD-10-PCS | Mod: CPTII,S$GLB,, | Performed by: INTERNAL MEDICINE

## 2021-11-04 PROCEDURE — 3077F PR MOST RECENT SYSTOLIC BLOOD PRESSURE >= 140 MM HG: ICD-10-PCS | Mod: CPTII,S$GLB,, | Performed by: INTERNAL MEDICINE

## 2021-11-04 PROCEDURE — 3077F SYST BP >= 140 MM HG: CPT | Mod: CPTII,S$GLB,, | Performed by: INTERNAL MEDICINE

## 2021-11-04 PROCEDURE — 1126F AMNT PAIN NOTED NONE PRSNT: CPT | Mod: CPTII,S$GLB,, | Performed by: INTERNAL MEDICINE

## 2021-11-04 PROCEDURE — 3044F HG A1C LEVEL LT 7.0%: CPT | Mod: CPTII,S$GLB,, | Performed by: INTERNAL MEDICINE

## 2021-11-04 RX ORDER — METFORMIN HYDROCHLORIDE 500 MG/1
500 TABLET ORAL 2 TIMES DAILY
COMMUNITY
Start: 2021-10-20 | End: 2022-09-13 | Stop reason: SINTOL

## 2021-11-05 ENCOUNTER — TELEPHONE (OUTPATIENT)
Dept: DERMATOLOGY | Facility: CLINIC | Age: 66
End: 2021-11-05
Payer: MEDICARE

## 2021-11-22 ENCOUNTER — SPECIALTY PHARMACY (OUTPATIENT)
Dept: PHARMACY | Facility: CLINIC | Age: 66
End: 2021-11-22
Payer: MEDICARE

## 2021-12-01 ENCOUNTER — TELEPHONE (OUTPATIENT)
Dept: HEMATOLOGY/ONCOLOGY | Facility: CLINIC | Age: 66
End: 2021-12-01
Payer: MEDICARE

## 2021-12-02 ENCOUNTER — OFFICE VISIT (OUTPATIENT)
Dept: HEMATOLOGY/ONCOLOGY | Facility: CLINIC | Age: 66
End: 2021-12-02
Payer: MEDICARE

## 2021-12-02 ENCOUNTER — LAB VISIT (OUTPATIENT)
Dept: LAB | Facility: HOSPITAL | Age: 66
End: 2021-12-02
Attending: INTERNAL MEDICINE
Payer: MEDICARE

## 2021-12-02 VITALS
DIASTOLIC BLOOD PRESSURE: 82 MMHG | HEIGHT: 71 IN | TEMPERATURE: 98 F | WEIGHT: 194 LBS | OXYGEN SATURATION: 98 % | SYSTOLIC BLOOD PRESSURE: 138 MMHG | BODY MASS INDEX: 27.16 KG/M2 | HEART RATE: 70 BPM

## 2021-12-02 DIAGNOSIS — R74.8 ELEVATED CK: ICD-10-CM

## 2021-12-02 DIAGNOSIS — E11.49 TYPE 2 DIABETES MELLITUS WITH NEUROLOGICAL COMPLICATIONS: ICD-10-CM

## 2021-12-02 DIAGNOSIS — C44.311 BASAL CELL CARCINOMA OF NOSE: ICD-10-CM

## 2021-12-02 DIAGNOSIS — M79.10 MYALGIA: ICD-10-CM

## 2021-12-02 DIAGNOSIS — C44.311 BASAL CELL CARCINOMA OF NOSE: Primary | ICD-10-CM

## 2021-12-02 DIAGNOSIS — E78.5 HYPERLIPIDEMIA, UNSPECIFIED HYPERLIPIDEMIA TYPE: ICD-10-CM

## 2021-12-02 LAB
ALBUMIN SERPL BCP-MCNC: 4.2 G/DL (ref 3.5–5.2)
ALP SERPL-CCNC: 83 U/L (ref 55–135)
ALT SERPL W/O P-5'-P-CCNC: 34 U/L (ref 10–44)
ANION GAP SERPL CALC-SCNC: 9 MMOL/L (ref 8–16)
AST SERPL-CCNC: 19 U/L (ref 10–40)
BASOPHILS # BLD AUTO: 0.03 K/UL (ref 0–0.2)
BASOPHILS NFR BLD: 0.5 % (ref 0–1.9)
BILIRUB SERPL-MCNC: 0.4 MG/DL (ref 0.1–1)
BUN SERPL-MCNC: 16 MG/DL (ref 8–23)
CALCIUM SERPL-MCNC: 10.5 MG/DL (ref 8.7–10.5)
CHLORIDE SERPL-SCNC: 102 MMOL/L (ref 95–110)
CK SERPL-CCNC: 197 U/L (ref 20–200)
CO2 SERPL-SCNC: 28 MMOL/L (ref 23–29)
CREAT SERPL-MCNC: 1.3 MG/DL (ref 0.5–1.4)
DIFFERENTIAL METHOD: ABNORMAL
EOSINOPHIL # BLD AUTO: 0.1 K/UL (ref 0–0.5)
EOSINOPHIL NFR BLD: 1.4 % (ref 0–8)
ERYTHROCYTE [DISTWIDTH] IN BLOOD BY AUTOMATED COUNT: 11.6 % (ref 11.5–14.5)
EST. GFR  (AFRICAN AMERICAN): >60 ML/MIN/1.73 M^2
EST. GFR  (NON AFRICAN AMERICAN): 57 ML/MIN/1.73 M^2
GLUCOSE SERPL-MCNC: 304 MG/DL (ref 70–110)
HCT VFR BLD AUTO: 44.2 % (ref 40–54)
HGB BLD-MCNC: 15.3 G/DL (ref 14–18)
IMM GRANULOCYTES # BLD AUTO: 0.03 K/UL (ref 0–0.04)
IMM GRANULOCYTES NFR BLD AUTO: 0.5 % (ref 0–0.5)
LYMPHOCYTES # BLD AUTO: 2.3 K/UL (ref 1–4.8)
LYMPHOCYTES NFR BLD: 35.2 % (ref 18–48)
MCH RBC QN AUTO: 32.7 PG (ref 27–31)
MCHC RBC AUTO-ENTMCNC: 34.6 G/DL (ref 32–36)
MCV RBC AUTO: 94 FL (ref 82–98)
MONOCYTES # BLD AUTO: 0.4 K/UL (ref 0.3–1)
MONOCYTES NFR BLD: 5.3 % (ref 4–15)
NEUTROPHILS # BLD AUTO: 3.8 K/UL (ref 1.8–7.7)
NEUTROPHILS NFR BLD: 57.1 % (ref 38–73)
NRBC BLD-RTO: 0 /100 WBC
PLATELET # BLD AUTO: 229 K/UL (ref 150–450)
PMV BLD AUTO: 9.8 FL (ref 9.2–12.9)
POTASSIUM SERPL-SCNC: 4.4 MMOL/L (ref 3.5–5.1)
PROT SERPL-MCNC: 7.7 G/DL (ref 6–8.4)
RBC # BLD AUTO: 4.68 M/UL (ref 4.6–6.2)
SODIUM SERPL-SCNC: 139 MMOL/L (ref 136–145)
WBC # BLD AUTO: 6.62 K/UL (ref 3.9–12.7)

## 2021-12-02 PROCEDURE — 99214 PR OFFICE/OUTPT VISIT, EST, LEVL IV, 30-39 MIN: ICD-10-PCS | Mod: S$GLB,,, | Performed by: INTERNAL MEDICINE

## 2021-12-02 PROCEDURE — 36415 COLL VENOUS BLD VENIPUNCTURE: CPT | Mod: PN | Performed by: INTERNAL MEDICINE

## 2021-12-02 PROCEDURE — 99214 OFFICE O/P EST MOD 30 MIN: CPT | Mod: S$GLB,,, | Performed by: INTERNAL MEDICINE

## 2021-12-02 PROCEDURE — 82550 ASSAY OF CK (CPK): CPT | Mod: PN | Performed by: INTERNAL MEDICINE

## 2021-12-02 PROCEDURE — 85025 COMPLETE CBC W/AUTO DIFF WBC: CPT | Mod: PN | Performed by: INTERNAL MEDICINE

## 2021-12-02 PROCEDURE — 99999 PR PBB SHADOW E&M-EST. PATIENT-LVL III: ICD-10-PCS | Mod: PBBFAC,,, | Performed by: INTERNAL MEDICINE

## 2021-12-02 PROCEDURE — 80053 COMPREHEN METABOLIC PANEL: CPT | Mod: PN | Performed by: INTERNAL MEDICINE

## 2021-12-02 PROCEDURE — 99999 PR PBB SHADOW E&M-EST. PATIENT-LVL III: CPT | Mod: PBBFAC,,, | Performed by: INTERNAL MEDICINE

## 2021-12-09 ENCOUNTER — TELEPHONE (OUTPATIENT)
Dept: DERMATOLOGY | Facility: CLINIC | Age: 66
End: 2021-12-09
Payer: MEDICARE

## 2021-12-16 ENCOUNTER — OFFICE VISIT (OUTPATIENT)
Dept: DERMATOLOGY | Facility: CLINIC | Age: 66
End: 2021-12-16
Payer: MEDICARE

## 2021-12-16 DIAGNOSIS — C44.311 BASAL CELL CARCINOMA OF NOSE: Primary | ICD-10-CM

## 2021-12-16 PROCEDURE — 99999 PR PBB SHADOW E&M-EST. PATIENT-LVL III: CPT | Mod: PBBFAC,,, | Performed by: DERMATOLOGY

## 2021-12-16 PROCEDURE — 99213 OFFICE O/P EST LOW 20 MIN: CPT | Mod: S$GLB,,, | Performed by: DERMATOLOGY

## 2021-12-16 PROCEDURE — 99213 PR OFFICE/OUTPT VISIT, EST, LEVL III, 20-29 MIN: ICD-10-PCS | Mod: S$GLB,,, | Performed by: DERMATOLOGY

## 2021-12-16 PROCEDURE — 99999 PR PBB SHADOW E&M-EST. PATIENT-LVL III: ICD-10-PCS | Mod: PBBFAC,,, | Performed by: DERMATOLOGY

## 2021-12-28 ENCOUNTER — SPECIALTY PHARMACY (OUTPATIENT)
Dept: PHARMACY | Facility: CLINIC | Age: 66
End: 2021-12-28
Payer: MEDICARE

## 2021-12-29 ENCOUNTER — TELEPHONE (OUTPATIENT)
Dept: DERMATOLOGY | Facility: CLINIC | Age: 66
End: 2021-12-29
Payer: MEDICARE

## 2022-01-03 ENCOUNTER — TELEPHONE (OUTPATIENT)
Dept: HEMATOLOGY/ONCOLOGY | Facility: CLINIC | Age: 67
End: 2022-01-03
Payer: MEDICARE

## 2022-01-03 NOTE — TELEPHONE ENCOUNTER
Called and spoke with pt in regards to rescheduling his appt from today as we was not able to make it. Pt voiced understanding. The soonest appt I could book was 1/20/2022. Pt voiced understanding.

## 2022-01-03 NOTE — TELEPHONE ENCOUNTER
----- Message from Zita Smith sent at 1/3/2022  3:04 PM CST -----  Type: Needs Medical Advice  Who Called: patient     Best Call Back Number:[phone]  Additional Information: patient request call back patient is returning call, please advise.

## 2022-01-03 NOTE — TELEPHONE ENCOUNTER
Called patient in regards to rescheduling his appointments. No answer. LVM and provided clinic number to call us back to reschedule.

## 2022-01-20 ENCOUNTER — TELEPHONE (OUTPATIENT)
Dept: HEMATOLOGY/ONCOLOGY | Facility: CLINIC | Age: 67
End: 2022-01-20
Payer: MEDICARE

## 2022-01-20 ENCOUNTER — OFFICE VISIT (OUTPATIENT)
Dept: HEMATOLOGY/ONCOLOGY | Facility: CLINIC | Age: 67
End: 2022-01-20
Payer: MEDICARE

## 2022-01-20 ENCOUNTER — LAB VISIT (OUTPATIENT)
Dept: LAB | Facility: HOSPITAL | Age: 67
End: 2022-01-20
Attending: INTERNAL MEDICINE
Payer: MEDICARE

## 2022-01-20 VITALS
BODY MASS INDEX: 27.56 KG/M2 | SYSTOLIC BLOOD PRESSURE: 201 MMHG | OXYGEN SATURATION: 98 % | WEIGHT: 196.88 LBS | DIASTOLIC BLOOD PRESSURE: 76 MMHG | HEIGHT: 71 IN | HEART RATE: 61 BPM | TEMPERATURE: 98 F | RESPIRATION RATE: 16 BRPM

## 2022-01-20 VITALS
OXYGEN SATURATION: 98 % | DIASTOLIC BLOOD PRESSURE: 78 MMHG | WEIGHT: 196.63 LBS | HEART RATE: 63 BPM | BODY MASS INDEX: 27.53 KG/M2 | TEMPERATURE: 98 F | HEIGHT: 71 IN | RESPIRATION RATE: 18 BRPM | SYSTOLIC BLOOD PRESSURE: 160 MMHG

## 2022-01-20 DIAGNOSIS — E11.49 TYPE 2 DIABETES MELLITUS WITH NEUROLOGICAL COMPLICATIONS: ICD-10-CM

## 2022-01-20 DIAGNOSIS — E78.5 HYPERLIPIDEMIA, UNSPECIFIED HYPERLIPIDEMIA TYPE: ICD-10-CM

## 2022-01-20 DIAGNOSIS — R74.8 ELEVATED CK: ICD-10-CM

## 2022-01-20 DIAGNOSIS — C44.311 BASAL CELL CARCINOMA OF NOSE: Primary | ICD-10-CM

## 2022-01-20 DIAGNOSIS — C44.311 BASAL CELL CARCINOMA OF NOSE: ICD-10-CM

## 2022-01-20 LAB
ALBUMIN SERPL BCP-MCNC: 4.2 G/DL (ref 3.5–5.2)
ALP SERPL-CCNC: 82 U/L (ref 55–135)
ALT SERPL W/O P-5'-P-CCNC: 33 U/L (ref 10–44)
ANION GAP SERPL CALC-SCNC: 9 MMOL/L (ref 8–16)
AST SERPL-CCNC: 20 U/L (ref 10–40)
BASOPHILS # BLD AUTO: 0.03 K/UL (ref 0–0.2)
BASOPHILS NFR BLD: 0.3 % (ref 0–1.9)
BILIRUB SERPL-MCNC: 0.5 MG/DL (ref 0.1–1)
BUN SERPL-MCNC: 22 MG/DL (ref 8–23)
CALCIUM SERPL-MCNC: 10.3 MG/DL (ref 8.7–10.5)
CHLORIDE SERPL-SCNC: 106 MMOL/L (ref 95–110)
CK SERPL-CCNC: 239 U/L (ref 20–200)
CO2 SERPL-SCNC: 25 MMOL/L (ref 23–29)
CREAT SERPL-MCNC: 1.1 MG/DL (ref 0.5–1.4)
DIFFERENTIAL METHOD: ABNORMAL
EOSINOPHIL # BLD AUTO: 0.1 K/UL (ref 0–0.5)
EOSINOPHIL NFR BLD: 0.8 % (ref 0–8)
ERYTHROCYTE [DISTWIDTH] IN BLOOD BY AUTOMATED COUNT: 11.9 % (ref 11.5–14.5)
EST. GFR  (AFRICAN AMERICAN): >60 ML/MIN/1.73 M^2
EST. GFR  (NON AFRICAN AMERICAN): >60 ML/MIN/1.73 M^2
ESTIMATED AVG GLUCOSE: 157 MG/DL (ref 68–131)
GLUCOSE SERPL-MCNC: 198 MG/DL (ref 70–110)
HBA1C MFR BLD: 7.1 % (ref 4–5.6)
HCT VFR BLD AUTO: 41.2 % (ref 40–54)
HGB BLD-MCNC: 14.5 G/DL (ref 14–18)
IMM GRANULOCYTES # BLD AUTO: 0.03 K/UL (ref 0–0.04)
IMM GRANULOCYTES NFR BLD AUTO: 0.3 % (ref 0–0.5)
LYMPHOCYTES # BLD AUTO: 3.3 K/UL (ref 1–4.8)
LYMPHOCYTES NFR BLD: 31.3 % (ref 18–48)
MCH RBC QN AUTO: 33 PG (ref 27–31)
MCHC RBC AUTO-ENTMCNC: 35.2 G/DL (ref 32–36)
MCV RBC AUTO: 94 FL (ref 82–98)
MONOCYTES # BLD AUTO: 0.6 K/UL (ref 0.3–1)
MONOCYTES NFR BLD: 5.3 % (ref 4–15)
NEUTROPHILS # BLD AUTO: 6.5 K/UL (ref 1.8–7.7)
NEUTROPHILS NFR BLD: 62 % (ref 38–73)
NRBC BLD-RTO: 0 /100 WBC
PLATELET # BLD AUTO: 215 K/UL (ref 150–450)
PMV BLD AUTO: 9.8 FL (ref 9.2–12.9)
POTASSIUM SERPL-SCNC: 4.6 MMOL/L (ref 3.5–5.1)
PROT SERPL-MCNC: 8 G/DL (ref 6–8.4)
RBC # BLD AUTO: 4.4 M/UL (ref 4.6–6.2)
SODIUM SERPL-SCNC: 140 MMOL/L (ref 136–145)
WBC # BLD AUTO: 10.45 K/UL (ref 3.9–12.7)

## 2022-01-20 PROCEDURE — 1126F PR PAIN SEVERITY QUANTIFIED, NO PAIN PRESENT: ICD-10-PCS | Mod: CPTII,S$GLB,, | Performed by: OTOLARYNGOLOGY

## 2022-01-20 PROCEDURE — 3008F BODY MASS INDEX DOCD: CPT | Mod: CPTII,S$GLB,, | Performed by: INTERNAL MEDICINE

## 2022-01-20 PROCEDURE — 3288F FALL RISK ASSESSMENT DOCD: CPT | Mod: CPTII,S$GLB,, | Performed by: OTOLARYNGOLOGY

## 2022-01-20 PROCEDURE — 3008F PR BODY MASS INDEX (BMI) DOCUMENTED: ICD-10-PCS | Mod: CPTII,S$GLB,, | Performed by: OTOLARYNGOLOGY

## 2022-01-20 PROCEDURE — 3051F HG A1C>EQUAL 7.0%<8.0%: CPT | Mod: CPTII,S$GLB,, | Performed by: OTOLARYNGOLOGY

## 2022-01-20 PROCEDURE — 3078F DIAST BP <80 MM HG: CPT | Mod: CPTII,S$GLB,, | Performed by: INTERNAL MEDICINE

## 2022-01-20 PROCEDURE — 99999 PR PBB SHADOW E&M-EST. PATIENT-LVL III: CPT | Mod: PBBFAC,,, | Performed by: INTERNAL MEDICINE

## 2022-01-20 PROCEDURE — 3078F PR MOST RECENT DIASTOLIC BLOOD PRESSURE < 80 MM HG: ICD-10-PCS | Mod: CPTII,S$GLB,, | Performed by: OTOLARYNGOLOGY

## 2022-01-20 PROCEDURE — 83036 HEMOGLOBIN GLYCOSYLATED A1C: CPT | Performed by: INTERNAL MEDICINE

## 2022-01-20 PROCEDURE — 3288F FALL RISK ASSESSMENT DOCD: CPT | Mod: CPTII,S$GLB,, | Performed by: INTERNAL MEDICINE

## 2022-01-20 PROCEDURE — 1101F PT FALLS ASSESS-DOCD LE1/YR: CPT | Mod: CPTII,S$GLB,, | Performed by: OTOLARYNGOLOGY

## 2022-01-20 PROCEDURE — 1101F PR PT FALLS ASSESS DOC 0-1 FALLS W/OUT INJ PAST YR: ICD-10-PCS | Mod: CPTII,S$GLB,, | Performed by: INTERNAL MEDICINE

## 2022-01-20 PROCEDURE — 1159F PR MEDICATION LIST DOCUMENTED IN MEDICAL RECORD: ICD-10-PCS | Mod: CPTII,S$GLB,, | Performed by: INTERNAL MEDICINE

## 2022-01-20 PROCEDURE — 85025 COMPLETE CBC W/AUTO DIFF WBC: CPT | Mod: PN | Performed by: INTERNAL MEDICINE

## 2022-01-20 PROCEDURE — 99215 PR OFFICE/OUTPT VISIT, EST, LEVL V, 40-54 MIN: ICD-10-PCS | Mod: S$GLB,,, | Performed by: OTOLARYNGOLOGY

## 2022-01-20 PROCEDURE — 3288F PR FALLS RISK ASSESSMENT DOCUMENTED: ICD-10-PCS | Mod: CPTII,S$GLB,, | Performed by: INTERNAL MEDICINE

## 2022-01-20 PROCEDURE — 1126F AMNT PAIN NOTED NONE PRSNT: CPT | Mod: CPTII,S$GLB,, | Performed by: OTOLARYNGOLOGY

## 2022-01-20 PROCEDURE — 3288F PR FALLS RISK ASSESSMENT DOCUMENTED: ICD-10-PCS | Mod: CPTII,S$GLB,, | Performed by: OTOLARYNGOLOGY

## 2022-01-20 PROCEDURE — 1126F PR PAIN SEVERITY QUANTIFIED, NO PAIN PRESENT: ICD-10-PCS | Mod: CPTII,S$GLB,, | Performed by: INTERNAL MEDICINE

## 2022-01-20 PROCEDURE — 3008F PR BODY MASS INDEX (BMI) DOCUMENTED: ICD-10-PCS | Mod: CPTII,S$GLB,, | Performed by: INTERNAL MEDICINE

## 2022-01-20 PROCEDURE — 82550 ASSAY OF CK (CPK): CPT | Mod: PN | Performed by: INTERNAL MEDICINE

## 2022-01-20 PROCEDURE — 3077F PR MOST RECENT SYSTOLIC BLOOD PRESSURE >= 140 MM HG: ICD-10-PCS | Mod: CPTII,S$GLB,, | Performed by: OTOLARYNGOLOGY

## 2022-01-20 PROCEDURE — 3077F SYST BP >= 140 MM HG: CPT | Mod: CPTII,S$GLB,, | Performed by: OTOLARYNGOLOGY

## 2022-01-20 PROCEDURE — 99999 PR PBB SHADOW E&M-EST. PATIENT-LVL III: ICD-10-PCS | Mod: PBBFAC,,, | Performed by: INTERNAL MEDICINE

## 2022-01-20 PROCEDURE — 3077F PR MOST RECENT SYSTOLIC BLOOD PRESSURE >= 140 MM HG: ICD-10-PCS | Mod: CPTII,S$GLB,, | Performed by: INTERNAL MEDICINE

## 2022-01-20 PROCEDURE — 1159F MED LIST DOCD IN RCRD: CPT | Mod: CPTII,S$GLB,, | Performed by: INTERNAL MEDICINE

## 2022-01-20 PROCEDURE — 1101F PT FALLS ASSESS-DOCD LE1/YR: CPT | Mod: CPTII,S$GLB,, | Performed by: INTERNAL MEDICINE

## 2022-01-20 PROCEDURE — 3051F PR MOST RECENT HEMOGLOBIN A1C LEVEL 7.0 - < 8.0%: ICD-10-PCS | Mod: CPTII,S$GLB,, | Performed by: OTOLARYNGOLOGY

## 2022-01-20 PROCEDURE — 99999 PR PBB SHADOW E&M-EST. PATIENT-LVL III: ICD-10-PCS | Mod: PBBFAC,,, | Performed by: OTOLARYNGOLOGY

## 2022-01-20 PROCEDURE — 1101F PR PT FALLS ASSESS DOC 0-1 FALLS W/OUT INJ PAST YR: ICD-10-PCS | Mod: CPTII,S$GLB,, | Performed by: OTOLARYNGOLOGY

## 2022-01-20 PROCEDURE — 3078F PR MOST RECENT DIASTOLIC BLOOD PRESSURE < 80 MM HG: ICD-10-PCS | Mod: CPTII,S$GLB,, | Performed by: INTERNAL MEDICINE

## 2022-01-20 PROCEDURE — 80053 COMPREHEN METABOLIC PANEL: CPT | Mod: PN | Performed by: INTERNAL MEDICINE

## 2022-01-20 PROCEDURE — 99215 OFFICE O/P EST HI 40 MIN: CPT | Mod: S$GLB,,, | Performed by: OTOLARYNGOLOGY

## 2022-01-20 PROCEDURE — 36415 COLL VENOUS BLD VENIPUNCTURE: CPT | Mod: PN | Performed by: INTERNAL MEDICINE

## 2022-01-20 PROCEDURE — 3077F SYST BP >= 140 MM HG: CPT | Mod: CPTII,S$GLB,, | Performed by: INTERNAL MEDICINE

## 2022-01-20 PROCEDURE — 1126F AMNT PAIN NOTED NONE PRSNT: CPT | Mod: CPTII,S$GLB,, | Performed by: INTERNAL MEDICINE

## 2022-01-20 PROCEDURE — 3078F DIAST BP <80 MM HG: CPT | Mod: CPTII,S$GLB,, | Performed by: OTOLARYNGOLOGY

## 2022-01-20 PROCEDURE — 99214 PR OFFICE/OUTPT VISIT, EST, LEVL IV, 30-39 MIN: ICD-10-PCS | Mod: S$GLB,,, | Performed by: INTERNAL MEDICINE

## 2022-01-20 PROCEDURE — 3008F BODY MASS INDEX DOCD: CPT | Mod: CPTII,S$GLB,, | Performed by: OTOLARYNGOLOGY

## 2022-01-20 PROCEDURE — 99999 PR PBB SHADOW E&M-EST. PATIENT-LVL III: CPT | Mod: PBBFAC,,, | Performed by: OTOLARYNGOLOGY

## 2022-01-20 PROCEDURE — 99214 OFFICE O/P EST MOD 30 MIN: CPT | Mod: S$GLB,,, | Performed by: INTERNAL MEDICINE

## 2022-01-20 NOTE — TELEPHONE ENCOUNTER
Spoke with the pt to give the pt his f/u appts. Pt stated to me that Dr. Perez said that he will see another md before doing his f/u with Wendy. Pt said that he will like for someone to call him with that md appt. Pt verbalized and understanding.

## 2022-01-20 NOTE — PROGRESS NOTES
PATIENT: Duane G Raines  MRN: [id number]  DATE: 1/20/2022      Diagnosis:   1. Basal cell carcinoma of nose    2. Hyperlipidemia, unspecified hyperlipidemia type    3. 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 Elevated CK        Chief Complaint: basal cell carcinoma of nose        Subjective:     Interval History: Mr. Sanchez is a 66 y.o. male with HTN, HLD, DMII, h/o CVA who presents for follow up of basal cell carcinoma.   Since the last clinic visit the patient met with Dr Melton on 12/16/21 with plans for Moh's procedure and reconstruction with Dr Perez.  He states he feels well.  The patient denies CP, cough, SOB, abdominal pain, N/V, constipation, diarrhea.  The patient denies fever, chills, night sweats, weight loss, new lumps or bumps, easy bruising or bleeding.    Prior History: The patient was initially diagnosed with basal cell carcinoma 9/2018 on the left side of the nose.  He was started On Odomzo 5/2021 with plan to help shrink the tumor for potential future surgical resection.  The patient saw Dr. Perez on 10/19/21.  After further discussion with Dr. Perez decision was made to direct the patient to Dr. Norm Melton possible Mohs procedure.     Past Medical History:   Past Medical History:   Diagnosis Date    Diabetes mellitus, type 2     Hyperlipidemia     Hypertension     Stroke        Past Surgical HIstory:   Past Surgical History:   Procedure Laterality Date    CA TRANSCRAN DOPP INTRACRAN, EMBOLI W/O INJ  1/6/2018            Family History:   Family History   Problem Relation Age of Onset    Diabetes Mother     Diabetes Maternal Grandmother        Social History:  reports that he has quit smoking. He has never used smokeless tobacco. He reports that he does not drink alcohol and does not use drugs.    Allergies:  Review of patient's allergies indicates:  No Known Allergies    Medications:  Current Outpatient Medications   Medication Sig Dispense Refill    atorvastatin  "(LIPITOR) 40 MG tablet Take 40 mg by mouth once daily.      glipiZIDE (GLUCOTROL) 5 MG tablet Take 2 tablets (10 mg total) by mouth 2 (two) times daily with meals. 120 tablet 11    lancets (ACCU-CHEK MULTICLIX LANCET) Misc 1 lancet by Misc.(Non-Drug; Combo Route) route 2 (two) times daily. 100 each 0    metFORMIN (GLUCOPHAGE) 500 MG tablet Take 500 mg by mouth 2 (two) times daily.      sonidegib (ODOMZO) 200 mg Cap Take 1 capsule (200 mg) by mouth once daily. 30 capsule 3     No current facility-administered medications for this visit.       Review of Systems   Constitutional: Negative for chills, diaphoresis, fatigue, fever and unexpected weight change.   Respiratory: Negative for cough and shortness of breath.    Cardiovascular: Negative for chest pain and palpitations.   Gastrointestinal: Negative for abdominal pain, constipation, diarrhea, nausea and vomiting.   Musculoskeletal: Negative for myalgias.   Skin: Negative for color change and rash.   Neurological: Negative for headaches.   Hematological: Negative for adenopathy. Does not bruise/bleed easily.       ECOG Performance Status: 0   Objective:      Vitals:   Vitals:    01/20/22 1339   BP: (!) 201/76   BP Location: Left arm   Patient Position: Sitting   Pulse: 61   Resp: 16   Temp: 97.8 °F (36.6 °C)   TempSrc: Temporal   SpO2: 98%   Weight: 89.3 kg (196 lb 13.9 oz)   Height: 5' 11" (1.803 m)       Physical Exam  Constitutional:       General: He is not in acute distress.     Appearance: He is well-developed. He is not diaphoretic.   HENT:      Head: Normocephalic and atraumatic.      Nose:      Comments: Pt with a pencil wide defect in the lateral lower left nare.  Cardiovascular:      Rate and Rhythm: Normal rate and regular rhythm.      Heart sounds: Normal heart sounds. No murmur heard.  No friction rub. No gallop.    Pulmonary:      Effort: Pulmonary effort is normal. No respiratory distress.      Breath sounds: Normal breath sounds. No wheezing or " rales.   Chest:      Chest wall: No tenderness.   Breasts:      Right: No axillary adenopathy or supraclavicular adenopathy.      Left: No axillary adenopathy or supraclavicular adenopathy.       Abdominal:      General: Bowel sounds are normal. There is no distension.      Palpations: Abdomen is soft. There is no mass.      Tenderness: There is no abdominal tenderness. There is no rebound.   Musculoskeletal:      Cervical back: Normal range of motion.   Lymphadenopathy:      Cervical: No cervical adenopathy.      Upper Body:      Right upper body: No supraclavicular or axillary adenopathy.      Left upper body: No supraclavicular or axillary adenopathy.   Skin:     General: Skin is warm and dry.      Findings: No erythema or rash.   Neurological:      Mental Status: He is alert and oriented to person, place, and time.   Psychiatric:         Behavior: Behavior normal.             Laboratory Data:  Lab Visit on 01/20/2022   Component Date Value Ref Range Status    WBC 01/20/2022 10.45  3.90 - 12.70 K/uL Final    RBC 01/20/2022 4.40* 4.60 - 6.20 M/uL Final    Hemoglobin 01/20/2022 14.5  14.0 - 18.0 g/dL Final    Hematocrit 01/20/2022 41.2  40.0 - 54.0 % Final    MCV 01/20/2022 94  82 - 98 fL Final    MCH 01/20/2022 33.0* 27.0 - 31.0 pg Final    MCHC 01/20/2022 35.2  32.0 - 36.0 g/dL Final    RDW 01/20/2022 11.9  11.5 - 14.5 % Final    Platelets 01/20/2022 215  150 - 450 K/uL Final    MPV 01/20/2022 9.8  9.2 - 12.9 fL Final    Immature Granulocytes 01/20/2022 0.3  0.0 - 0.5 % Final    Gran # (ANC) 01/20/2022 6.5  1.8 - 7.7 K/uL Final    Immature Grans (Abs) 01/20/2022 0.03  0.00 - 0.04 K/uL Final    Comment: Mild elevation in immature granulocytes is non specific and   can be seen in a variety of conditions including stress response,   acute inflammation, trauma and pregnancy. Correlation with other   laboratory and clinical findings is essential.      Lymph # 01/20/2022 3.3  1.0 - 4.8 K/uL Final    Mono  # 01/20/2022 0.6  0.3 - 1.0 K/uL Final    Eos # 01/20/2022 0.1  0.0 - 0.5 K/uL Final    Baso # 01/20/2022 0.03  0.00 - 0.20 K/uL Final    nRBC 01/20/2022 0  0 /100 WBC Final    Gran % 01/20/2022 62.0  38.0 - 73.0 % Final    Lymph % 01/20/2022 31.3  18.0 - 48.0 % Final    Mono % 01/20/2022 5.3  4.0 - 15.0 % Final    Eosinophil % 01/20/2022 0.8  0.0 - 8.0 % Final    Basophil % 01/20/2022 0.3  0.0 - 1.9 % Final    Differential Method 01/20/2022 Automated   Final    Sodium 01/20/2022 140  136 - 145 mmol/L Final    Potassium 01/20/2022 4.6  3.5 - 5.1 mmol/L Final    Chloride 01/20/2022 106  95 - 110 mmol/L Final    CO2 01/20/2022 25  23 - 29 mmol/L Final    Glucose 01/20/2022 198* 70 - 110 mg/dL Final    BUN 01/20/2022 22  8 - 23 mg/dL Final    Creatinine 01/20/2022 1.1  0.5 - 1.4 mg/dL Final    Calcium 01/20/2022 10.3  8.7 - 10.5 mg/dL Final    Total Protein 01/20/2022 8.0  6.0 - 8.4 g/dL Final    Albumin 01/20/2022 4.2  3.5 - 5.2 g/dL Final    Total Bilirubin 01/20/2022 0.5  0.1 - 1.0 mg/dL Final    Comment: For infants and newborns, interpretation of results should be based  on gestational age, weight and in agreement with clinical  observations.    Premature Infant recommended reference ranges:  Up to 24 hours.............<8.0 mg/dL  Up to 48 hours............<12.0 mg/dL  3-5 days..................<15.0 mg/dL  6-29 days.................<15.0 mg/dL      Alkaline Phosphatase 01/20/2022 82  55 - 135 U/L Final    AST 01/20/2022 20  10 - 40 U/L Final    ALT 01/20/2022 33  10 - 44 U/L Final    Anion Gap 01/20/2022 9  8 - 16 mmol/L Final    eGFR if African American 01/20/2022 >60  >60 mL/min/1.73 m^2 Final    eGFR if non African American 01/20/2022 >60  >60 mL/min/1.73 m^2 Final    Comment: Calculation used to obtain the estimated glomerular filtration  rate (eGFR) is the CKD-EPI equation.       CPK 01/20/2022 239* 20 - 200 U/L Final         Imaging: Reviewed    Assessment:       1. Basal cell  carcinoma of nose    2. Hyperlipidemia, unspecified hyperlipidemia type    3. 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 Elevated CK           Plan:     Basal Cell Carcinoma - pt with basal cell carcinoma of the left nose  -Pt saw Dr Melton on 12/16/21 with plan for potential Moh's procedure.  -Pt case discussed with Dr Perez today  -Agree with proceeding with Moh's followed by reconstruction in order to have the patient potentially taken off of Odomzo in the future    HLD - pt on atorvastatin  -Management per PCP    DMII - pt on Glipizide, metformin  -A1C from today pending  -Management per PCP    Elevated CPK - due to Odomzo  -Current CPK elevated at 239U/L  -Will monitor    Myalgias - likely from Odomzo    Follow Up - appt with me in 1 month with A1C, CBC, CMP and CPK prior to the appt.    Kike Nguyen MD  [facility]  Hematology and Oncology  [facility]   [address] [place] [address]   O: [phone]  F: [phone]

## 2022-01-20 NOTE — PROGRESS NOTES
Date of Encounter: 4/20/2021  Provider: Estelle Perez MD  Referring MD: Norm Melton MD  PCP: Escobar Hoffmann MD  Derm: Althea Archibald MD; Norm Melton MD  Cardiology:    CC:  BCCA nasal tip    HPI:      Patient is a 66 for reconstruction status post future Moh's surgery for basal cell carcinoma involving the left nasal tip.  Patient reports history of this 2 years ago at which time he was seen by Dr. Ochoa. He was treated with Odomzo 200 mg X 3 months which completely cleared the lesion.  Lesion on his left nasal tip started to recur about 8 months ago and he was ultimately seen by Dr. Archibald who performed a biopsy which was positive for basal cell CA.  Dr. Melton recommended surgical excision via Moh's micro surgery follow with reconstruction.    Patient denies any pain.  He informed me today that he does not wish to have surgery but would like to explore taking the hedgehog inhibitor again.    10/19/2021   Patient is here today for follow-up basal cell carcinoma involving the left nasal tip  No complaints  No new lesions  He is inquiring about reconstructive surgery  Patient continues to receive a hedgehog inhibitor--one pill a day X 6 months    1/20/2022  Patient is here today to discuss nasal reconstruction.  He is status post treatment with a hedgehog inhibitor with no evidence of gross disease.  He was seen by Dr. Melton to discuss Moh's micro surgery. Patient wishes to proceed with Moh's.  Patient has no new complaints.    ROS: see HPI  Constitutional: Negative for activity change and appetite change, weight loss.   Eyes: Negative for discharge, visual changes.   Respiratory: Negative for difficulty breathing and wheezing   Cardiovascular: Negative for chest pain.   Gastrointestinal: Negative for abdominal distention and abdominal pain.   Endocrine: Negative for cold intolerance and heat intolerance.   Genitourinary: Negative for dysuria.   Musculoskeletal: Negative for gait problem, muscle  pain and joint swelling.   Skin: Negative for color change and pallor; negative for skin lesions.   Neurological: Negative for syncope and weakness; no numbness face.   Psychiatric/Behavioral: Negative for agitation and confusion; negative for depression.    Physical Exam:      Constitutional  · General Appearance: well nourished, well-developed, alert, oriented, in no acute distress  · Communication: ability, understanding, normal  Nose  · External Nose: < 1 cm through and through defect at junction of left nasal tip and nasal ala; adjacent superior depressed hypopigmented scar medial aspect nasal ala and lateral nasal sidewall--no tumor seen  · Intranasal Exam: no edema, erythema, discharge, mass or obstruction  Psychiatric  · Orientation: oriented to time, place and person  · Mood and Affect: no depression, anxiety or agitation      PATH = basal cell carcinoma; pathology accession #[id number]-, [facility]         Records reviewed: Dr Nguyen 2/17/2022  Assessment:       1. Basal cell carcinoma of nose    2. Hyperlipidemia, unspecified hyperlipidemia type    3. 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 Elevated CK          Plan:      Basal Cell Carcinoma - pt with basal cell carcinoma of the left nose  -Pt to undergo Moh's procedure on 3/16/22 with potential reconstruction  -Pt can come off of Odomzo after the surgery  -Will have the patient follow up in clinci 2 weeks after surgery    Assessment:   BCCA left nasal tip and ala, S/P Odmozo with great response--no visible tumor  Patient evaluted by Dr Melton for Moh's     Plan:  Reconstruction discussed at length using photographs and multiple questions were answered  Reconstructive options discussed were chondral cartilage graft, nasal labial flap, forehead flap    Time spent with patient was 40 minutes which was greater than 50% of the time spent examining him

## 2022-01-21 ENCOUNTER — SPECIALTY PHARMACY (OUTPATIENT)
Dept: PHARMACY | Facility: CLINIC | Age: 67
End: 2022-01-21
Payer: MEDICARE

## 2022-01-21 NOTE — TELEPHONE ENCOUNTER
Specialty Pharmacy - Refill Coordination  Specialty Pharmacy - Clinical Reassessment    Specialty Medication Orders Linked to Encounter    Flowsheet Row Most Recent Value   Medication #1 sonidegib (ODOMZO) 200 mg Cap (Order#[id number], Rx#[id number])          Refill Questions - Documented Responses    Flowsheet Row Most Recent Value   Patient Availability and HIPAA Verification    Does patient want to proceed with activity? Yes   HIPAA/medical authority confirmed? Yes   Relationship to patient of person spoken to? Self   Refill Screening Questions    Changes to allergies? No   Changes to medications? No   New conditions since last clinic visit? No   Unplanned office visit, urgent care, ED, or hospital admission in the last 4 weeks? No   How does patient/caregiver feel medication is working? Good   Financial problems or insurance changes? No   How many doses of your specialty medications were missed in the last 4 weeks? 0   Would patient like to speak to a pharmacist? No   When does the patient need to receive the medication? 01/31/22   Refill Delivery Questions    How will the patient receive the medication? Delivery Elle   When does the patient need to receive the medication? 01/31/22   Shipping Address Home   Address in [facility] confirmed and updated if neccessary? Yes   Expected Copay ($) 0   Is the patient able to afford the medication copay? Yes   Payment Method zero copay   Days supply of Refill 30   Supplies needed? No supplies needed   Refill activity completed? Yes   Refill activity plan Refill scheduled   Shipment/Pickup Date: 01/25/22          Current Outpatient Medications   Medication Sig    atorvastatin (LIPITOR) 40 MG tablet Take 40 mg by mouth once daily.    glipiZIDE (GLUCOTROL) 5 MG tablet Take 2 tablets (10 mg total) by mouth 2 (two) times daily with meals.    lancets (ACCU-CHEK MULTICLIX LANCET) Misc 1 lancet by Misc.(Non-Drug; Combo Route) route 2 (two) times daily.    metFORMIN  (GLUCOPHAGE) 500 MG tablet Take 500 mg by mouth 2 (two) times daily.    sonidegib (ODOMZO) 200 mg Cap Take 1 capsule (200 mg) by mouth once daily.   Last reviewed on 1/20/2022  1:40 PM by Luke Gilliam MA    Review of patient's allergies indicates:  No Known Allergies Last reviewed on  1/21/2022 4:30 PM by Payal Mcwilliams      Tasks added this encounter   No tasks added.   Tasks due within next 3 months   1/12/2022 - Clinical - Follow Up Assesement (90 day)  1/22/2022 - Refill Call (Auto Added)     Payal Mcwilliams, PharmD  [facility]  [address] [facility] [address]  Phone: [phone]  Fax: [phone]

## 2022-01-21 NOTE — TELEPHONE ENCOUNTER
Duane G Raines is a 66 y.o. male, who is followed by the specialty pharmacy service for management and education of his Odomzo.  He has been on therapy with Odomzo for 8 months.  I have reviewed his electronic medical record and current medication list and determined that specialty medication adjustment Is not needed at this time.    Patient has not experienced adverse events.  He Is adherent reporting 0 missed doses since last review.  Adherence has been encouraged with the following mechanism(s): continuing to take medication daily as prescribed .  He is meeting goals of therapy and will continue treatment.    Follow Up Review 1/21/2022 12/28/2021 11/22/2021   # of missed doses 0 0 0   New Medications? No No No   New Conditions? No No No   New Allergies? No No No   Medication Effective? Good Good Very good   Some recent data might be hidden       Therapy is appropriate to continue.    Therapy is effective: Yes  Recommendations: Continue to follow-up with providers to schedule Mohs surgery to remove lesions and potentially d/c post op.   Review Method: Patient Contact     Labs from 1/20/2022 reviewed - WBC, ANC and platelets all normal. Renal and hepatic function normal. CPK elevated - likely contributing to his myalgias (that patient reports are stable) - he is monitored monthly. Ok to continue with treatment for now. Ok to proceed with refill.     Payal Mcwilliams, PharmD  [facility]  [address] [facility] [address]  Phone: [phone]  Fax: [phone]

## 2022-01-25 DIAGNOSIS — Z98.890 MOHS DEFECT: Primary | ICD-10-CM

## 2022-01-25 DIAGNOSIS — L98.8 MOHS DEFECT: Primary | ICD-10-CM

## 2022-01-26 ENCOUNTER — TELEPHONE (OUTPATIENT)
Dept: DERMATOLOGY | Facility: CLINIC | Age: 67
End: 2022-01-26
Payer: MEDICARE

## 2022-01-26 ENCOUNTER — TELEPHONE (OUTPATIENT)
Dept: HEMATOLOGY/ONCOLOGY | Facility: CLINIC | Age: 67
End: 2022-01-26
Payer: MEDICARE

## 2022-01-27 ENCOUNTER — TELEPHONE (OUTPATIENT)
Dept: HEMATOLOGY/ONCOLOGY | Facility: CLINIC | Age: 67
End: 2022-01-27
Payer: MEDICARE

## 2022-01-27 NOTE — TELEPHONE ENCOUNTER
Called and discussed pt's upcoming surgery with Dr. Perez scheduled 3/16/22.  Pre op instructions given to pt.  Appointment schedule reviewed with pt.  All questions answered to pt's satisfaction.

## 2022-01-28 DIAGNOSIS — C44.311 BASAL CELL CARCINOMA OF NOSE: Primary | ICD-10-CM

## 2022-02-16 NOTE — PROGRESS NOTES
PATIENT: Duane G Raines  MRN: [id number]  DATE: 2/17/2022      Diagnosis:   1. Basal cell carcinoma of nose    2. Hyperlipidemia, unspecified hyperlipidemia type    3. 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 Elevated CK        Chief Complaint: Establish Care (Basal Cell Carcinoma)        Subjective:     Interval History: Mr. Sanchez is a 66 y.o. male with HTN, HLD, DMII, h/o CVA who presents for follow up of basal cell carcinoma.   Since the last clinic visit the patient had been scheduled for Moh's procedure on 3/16/22.  The patient states he feels well.  He continues to have a left sided nasal defect.  The patient denies CP, cough, SOB, abdominal pain, N/V, constipation, diarrhea.  The patient denies fever, chills, night sweats, weight loss, new lumps or bumps, easy bruising or bleeding.    Prior History: The patient was initially diagnosed with basal cell carcinoma 9/2018 on the left side of the nose.  He was started On Odomzo 5/2021 with plan to help shrink the tumor for potential future surgical resection.  The patient saw Dr. Perez on 10/19/21.  After further discussion with Dr. Perez decision was made to direct the patient to Dr. Norm Melton possible Mohs procedure.   The patient met with Dr Melton on 12/16/21 with plans for Moh's procedure and reconstruction with Dr Perez.    Past Medical History:   Past Medical History:   Diagnosis Date    Diabetes mellitus, type 2     Hyperlipidemia     Hypertension     Stroke        Past Surgical HIstory:   Past Surgical History:   Procedure Laterality Date    MO TRANSCRAN DOPP INTRACRAN, EMBOLI W/O INJ  1/6/2018            Family History:   Family History   Problem Relation Age of Onset    Diabetes Mother     Diabetes Maternal Grandmother        Social History:  reports that he has quit smoking. He has never used smokeless tobacco. He reports that he does not drink alcohol and does not use drugs.    Allergies:  Review of patient's allergies  "indicates:  No Known Allergies    Medications:  Current Outpatient Medications   Medication Sig Dispense Refill    atorvastatin (LIPITOR) 40 MG tablet Take 40 mg by mouth once daily.      glipiZIDE (GLUCOTROL) 5 MG tablet Take 2 tablets (10 mg total) by mouth 2 (two) times daily with meals. 120 tablet 11    lancets (ACCU-CHEK MULTICLIX LANCET) Misc 1 lancet by Misc.(Non-Drug; Combo Route) route 2 (two) times daily. 100 each 0    metFORMIN (GLUCOPHAGE) 500 MG tablet Take 500 mg by mouth 2 (two) times daily.      sonidegib (ODOMZO) 200 mg Cap Take 1 capsule (200 mg) by mouth once daily. 30 capsule 3     No current facility-administered medications for this visit.       Review of Systems   Constitutional: Negative for chills, diaphoresis, fatigue, fever and unexpected weight change.   HENT:        Defect in nose   Respiratory: Negative for cough and shortness of breath.    Cardiovascular: Negative for chest pain and palpitations.   Gastrointestinal: Negative for abdominal pain, constipation, diarrhea, nausea and vomiting.   Musculoskeletal: Negative for myalgias.   Skin: Negative for color change and rash.   Neurological: Negative for headaches.   Hematological: Negative for adenopathy. Does not bruise/bleed easily.       ECOG Performance Status: 0   Objective:      Vitals:   Vitals:    02/17/22 1343   BP: (!) 156/77   BP Location: Left arm   Patient Position: Sitting   BP Method: Medium (Automatic)   Pulse: 70   Resp: 18   Temp: 97.8 °F (36.6 °C)   TempSrc: Temporal   SpO2: 98%   Weight: 87.7 kg (193 lb 5.5 oz)   Height: 5' 11" (1.803 m)       Physical Exam  Constitutional:       General: He is not in acute distress.     Appearance: He is well-developed. He is not diaphoretic.   HENT:      Head: Normocephalic and atraumatic.      Nose:      Comments: Pt with a pencil wide defect in the lateral lower left nare.  Cardiovascular:      Rate and Rhythm: Normal rate and regular rhythm.      Heart sounds: Normal heart " sounds. No murmur heard.  No friction rub. No gallop.    Pulmonary:      Effort: Pulmonary effort is normal. No respiratory distress.      Breath sounds: Normal breath sounds. No wheezing or rales.   Chest:      Chest wall: No tenderness.   Breasts:      Right: No axillary adenopathy or supraclavicular adenopathy.      Left: No axillary adenopathy or supraclavicular adenopathy.       Abdominal:      General: Bowel sounds are normal. There is no distension.      Palpations: Abdomen is soft. There is no mass.      Tenderness: There is no abdominal tenderness. There is no rebound.   Musculoskeletal:      Cervical back: Normal range of motion.   Lymphadenopathy:      Cervical: No cervical adenopathy.      Upper Body:      Right upper body: No supraclavicular or axillary adenopathy.      Left upper body: No supraclavicular or axillary adenopathy.   Skin:     General: Skin is warm and dry.      Findings: No erythema or rash.   Neurological:      Mental Status: He is alert and oriented to person, place, and time.   Psychiatric:         Behavior: Behavior normal.                 Laboratory Data:  Lab Visit on 02/17/2022   Component Date Value Ref Range Status    WBC 02/17/2022 7.29  3.90 - 12.70 K/uL Final    RBC 02/17/2022 4.20* 4.60 - 6.20 M/uL Final    Hemoglobin 02/17/2022 13.5* 14.0 - 18.0 g/dL Final    Hematocrit 02/17/2022 38.6* 40.0 - 54.0 % Final    MCV 02/17/2022 92  82 - 98 fL Final    MCH 02/17/2022 32.1* 27.0 - 31.0 pg Final    MCHC 02/17/2022 35.0  32.0 - 36.0 g/dL Final    RDW 02/17/2022 11.9  11.5 - 14.5 % Final    Platelets 02/17/2022 196  150 - 450 K/uL Final    MPV 02/17/2022 9.8  9.2 - 12.9 fL Final    Immature Granulocytes 02/17/2022 0.5  0.0 - 0.5 % Final    Gran # (ANC) 02/17/2022 4.3  1.8 - 7.7 K/uL Final    Immature Grans (Abs) 02/17/2022 0.04  0.00 - 0.04 K/uL Final    Comment: Mild elevation in immature granulocytes is non specific and   can be seen in a variety of conditions  including stress response,   acute inflammation, trauma and pregnancy. Correlation with other   laboratory and clinical findings is essential.      Lymph # 02/17/2022 2.5  1.0 - 4.8 K/uL Final    Mono # 02/17/2022 0.4  0.3 - 1.0 K/uL Final    Eos # 02/17/2022 0.1  0.0 - 0.5 K/uL Final    Baso # 02/17/2022 0.03  0.00 - 0.20 K/uL Final    nRBC 02/17/2022 0  0 /100 WBC Final    Gran % 02/17/2022 59.2  38.0 - 73.0 % Final    Lymph % 02/17/2022 33.6  18.0 - 48.0 % Final    Mono % 02/17/2022 5.3  4.0 - 15.0 % Final    Eosinophil % 02/17/2022 1.0  0.0 - 8.0 % Final    Basophil % 02/17/2022 0.4  0.0 - 1.9 % Final    Differential Method 02/17/2022 Automated   Final    Sodium 02/17/2022 141  136 - 145 mmol/L Final    Potassium 02/17/2022 3.8  3.5 - 5.1 mmol/L Final    Chloride 02/17/2022 105  95 - 110 mmol/L Final    CO2 02/17/2022 28  23 - 29 mmol/L Final    Glucose 02/17/2022 286* 70 - 110 mg/dL Final    BUN 02/17/2022 14  8 - 23 mg/dL Final    Creatinine 02/17/2022 1.2  0.5 - 1.4 mg/dL Final    Calcium 02/17/2022 10.1  8.7 - 10.5 mg/dL Final    Total Protein 02/17/2022 7.5  6.0 - 8.4 g/dL Final    Albumin 02/17/2022 3.9  3.5 - 5.2 g/dL Final    Total Bilirubin 02/17/2022 0.4  0.1 - 1.0 mg/dL Final    Comment: For infants and newborns, interpretation of results should be based  on gestational age, weight and in agreement with clinical  observations.    Premature Infant recommended reference ranges:  Up to 24 hours.............<8.0 mg/dL  Up to 48 hours............<12.0 mg/dL  3-5 days..................<15.0 mg/dL  6-29 days.................<15.0 mg/dL      Alkaline Phosphatase 02/17/2022 96  55 - 135 U/L Final    AST 02/17/2022 18  10 - 40 U/L Final    ALT 02/17/2022 31  10 - 44 U/L Final    Anion Gap 02/17/2022 8  8 - 16 mmol/L Final    eGFR if African American 02/17/2022 >60  >60 mL/min/1.73 m^2 Final    eGFR if non African American 02/17/2022 >60  >60 mL/min/1.73 m^2 Final    Comment:  Calculation used to obtain the estimated glomerular filtration  rate (eGFR) is the CKD-EPI equation.            Imaging: Reviewed    Assessment:       1. Basal cell carcinoma of nose    2. Hyperlipidemia, unspecified hyperlipidemia type    3. 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 Elevated CK           Plan:     Basal Cell Carcinoma - pt with basal cell carcinoma of the left nose  -Pt to undergo Moh's procedure on 3/16/22 with potential reconstruction  -Pt can come off of Odomzo after the surgery  -Will have the patient follow up in clinci 2 weeks after surgery    HLD - pt on atorvastatin  -Management per PCP    DMII - pt on Glipizide, metformin  -A1C from today pending  -Management per PCP    Elevated CPK - due to Odomzo  -CPK elevated at 239U/L on 1/20/22  -Will repeat priro to follow up appt    Follow Up - CBC, CMP and CPK the week of 3/28/22 with an appt with me after the labs are done    Kike Nguyen MD  [facility]  Hematology and Oncology  [facility] [address] [place] [address]   O: [phone]  F: [phone]

## 2022-02-17 ENCOUNTER — OFFICE VISIT (OUTPATIENT)
Dept: HEMATOLOGY/ONCOLOGY | Facility: CLINIC | Age: 67
End: 2022-02-17
Payer: MEDICARE

## 2022-02-17 ENCOUNTER — LAB VISIT (OUTPATIENT)
Dept: LAB | Facility: HOSPITAL | Age: 67
End: 2022-02-17
Attending: INTERNAL MEDICINE
Payer: MEDICARE

## 2022-02-17 VITALS
HEART RATE: 70 BPM | BODY MASS INDEX: 27.06 KG/M2 | TEMPERATURE: 98 F | SYSTOLIC BLOOD PRESSURE: 156 MMHG | RESPIRATION RATE: 18 BRPM | HEIGHT: 71 IN | WEIGHT: 193.31 LBS | OXYGEN SATURATION: 98 % | DIASTOLIC BLOOD PRESSURE: 77 MMHG

## 2022-02-17 DIAGNOSIS — C44.311 BASAL CELL CARCINOMA OF NOSE: ICD-10-CM

## 2022-02-17 DIAGNOSIS — R74.8 ELEVATED CK: ICD-10-CM

## 2022-02-17 DIAGNOSIS — C44.311 BASAL CELL CARCINOMA OF NOSE: Primary | ICD-10-CM

## 2022-02-17 DIAGNOSIS — E11.49 TYPE 2 DIABETES MELLITUS WITH NEUROLOGICAL COMPLICATIONS: ICD-10-CM

## 2022-02-17 DIAGNOSIS — E78.5 HYPERLIPIDEMIA, UNSPECIFIED HYPERLIPIDEMIA TYPE: ICD-10-CM

## 2022-02-17 LAB
ALBUMIN SERPL BCP-MCNC: 3.9 G/DL (ref 3.5–5.2)
ALP SERPL-CCNC: 96 U/L (ref 55–135)
ALT SERPL W/O P-5'-P-CCNC: 31 U/L (ref 10–44)
ANION GAP SERPL CALC-SCNC: 8 MMOL/L (ref 8–16)
AST SERPL-CCNC: 18 U/L (ref 10–40)
BASOPHILS # BLD AUTO: 0.03 K/UL (ref 0–0.2)
BASOPHILS NFR BLD: 0.4 % (ref 0–1.9)
BILIRUB SERPL-MCNC: 0.4 MG/DL (ref 0.1–1)
BUN SERPL-MCNC: 14 MG/DL (ref 8–23)
CALCIUM SERPL-MCNC: 10.1 MG/DL (ref 8.7–10.5)
CHLORIDE SERPL-SCNC: 105 MMOL/L (ref 95–110)
CO2 SERPL-SCNC: 28 MMOL/L (ref 23–29)
CREAT SERPL-MCNC: 1.2 MG/DL (ref 0.5–1.4)
DIFFERENTIAL METHOD: ABNORMAL
EOSINOPHIL # BLD AUTO: 0.1 K/UL (ref 0–0.5)
EOSINOPHIL NFR BLD: 1 % (ref 0–8)
ERYTHROCYTE [DISTWIDTH] IN BLOOD BY AUTOMATED COUNT: 11.9 % (ref 11.5–14.5)
EST. GFR  (AFRICAN AMERICAN): >60 ML/MIN/1.73 M^2
EST. GFR  (NON AFRICAN AMERICAN): >60 ML/MIN/1.73 M^2
GLUCOSE SERPL-MCNC: 286 MG/DL (ref 70–110)
HCT VFR BLD AUTO: 38.6 % (ref 40–54)
HGB BLD-MCNC: 13.5 G/DL (ref 14–18)
IMM GRANULOCYTES # BLD AUTO: 0.04 K/UL (ref 0–0.04)
IMM GRANULOCYTES NFR BLD AUTO: 0.5 % (ref 0–0.5)
LYMPHOCYTES # BLD AUTO: 2.5 K/UL (ref 1–4.8)
LYMPHOCYTES NFR BLD: 33.6 % (ref 18–48)
MCH RBC QN AUTO: 32.1 PG (ref 27–31)
MCHC RBC AUTO-ENTMCNC: 35 G/DL (ref 32–36)
MCV RBC AUTO: 92 FL (ref 82–98)
MONOCYTES # BLD AUTO: 0.4 K/UL (ref 0.3–1)
MONOCYTES NFR BLD: 5.3 % (ref 4–15)
NEUTROPHILS # BLD AUTO: 4.3 K/UL (ref 1.8–7.7)
NEUTROPHILS NFR BLD: 59.2 % (ref 38–73)
NRBC BLD-RTO: 0 /100 WBC
PLATELET # BLD AUTO: 196 K/UL (ref 150–450)
PMV BLD AUTO: 9.8 FL (ref 9.2–12.9)
POTASSIUM SERPL-SCNC: 3.8 MMOL/L (ref 3.5–5.1)
PROT SERPL-MCNC: 7.5 G/DL (ref 6–8.4)
RBC # BLD AUTO: 4.2 M/UL (ref 4.6–6.2)
SODIUM SERPL-SCNC: 141 MMOL/L (ref 136–145)
WBC # BLD AUTO: 7.29 K/UL (ref 3.9–12.7)

## 2022-02-17 PROCEDURE — 99999 PR PBB SHADOW E&M-EST. PATIENT-LVL III: ICD-10-PCS | Mod: PBBFAC,,, | Performed by: INTERNAL MEDICINE

## 2022-02-17 PROCEDURE — 3051F HG A1C>EQUAL 7.0%<8.0%: CPT | Mod: CPTII,S$GLB,, | Performed by: INTERNAL MEDICINE

## 2022-02-17 PROCEDURE — 99215 PR OFFICE/OUTPT VISIT, EST, LEVL V, 40-54 MIN: ICD-10-PCS | Mod: S$GLB,,, | Performed by: INTERNAL MEDICINE

## 2022-02-17 PROCEDURE — 3078F DIAST BP <80 MM HG: CPT | Mod: CPTII,S$GLB,, | Performed by: INTERNAL MEDICINE

## 2022-02-17 PROCEDURE — 3008F PR BODY MASS INDEX (BMI) DOCUMENTED: ICD-10-PCS | Mod: CPTII,S$GLB,, | Performed by: INTERNAL MEDICINE

## 2022-02-17 PROCEDURE — 3077F PR MOST RECENT SYSTOLIC BLOOD PRESSURE >= 140 MM HG: ICD-10-PCS | Mod: CPTII,S$GLB,, | Performed by: INTERNAL MEDICINE

## 2022-02-17 PROCEDURE — 1101F PT FALLS ASSESS-DOCD LE1/YR: CPT | Mod: CPTII,S$GLB,, | Performed by: INTERNAL MEDICINE

## 2022-02-17 PROCEDURE — 3051F PR MOST RECENT HEMOGLOBIN A1C LEVEL 7.0 - < 8.0%: ICD-10-PCS | Mod: CPTII,S$GLB,, | Performed by: INTERNAL MEDICINE

## 2022-02-17 PROCEDURE — 3288F PR FALLS RISK ASSESSMENT DOCUMENTED: ICD-10-PCS | Mod: CPTII,S$GLB,, | Performed by: INTERNAL MEDICINE

## 2022-02-17 PROCEDURE — 3078F PR MOST RECENT DIASTOLIC BLOOD PRESSURE < 80 MM HG: ICD-10-PCS | Mod: CPTII,S$GLB,, | Performed by: INTERNAL MEDICINE

## 2022-02-17 PROCEDURE — 3077F SYST BP >= 140 MM HG: CPT | Mod: CPTII,S$GLB,, | Performed by: INTERNAL MEDICINE

## 2022-02-17 PROCEDURE — 1126F AMNT PAIN NOTED NONE PRSNT: CPT | Mod: CPTII,S$GLB,, | Performed by: INTERNAL MEDICINE

## 2022-02-17 PROCEDURE — 1159F MED LIST DOCD IN RCRD: CPT | Mod: CPTII,S$GLB,, | Performed by: INTERNAL MEDICINE

## 2022-02-17 PROCEDURE — 3008F BODY MASS INDEX DOCD: CPT | Mod: CPTII,S$GLB,, | Performed by: INTERNAL MEDICINE

## 2022-02-17 PROCEDURE — 1101F PR PT FALLS ASSESS DOC 0-1 FALLS W/OUT INJ PAST YR: ICD-10-PCS | Mod: CPTII,S$GLB,, | Performed by: INTERNAL MEDICINE

## 2022-02-17 PROCEDURE — 99215 OFFICE O/P EST HI 40 MIN: CPT | Mod: S$GLB,,, | Performed by: INTERNAL MEDICINE

## 2022-02-17 PROCEDURE — 1126F PR PAIN SEVERITY QUANTIFIED, NO PAIN PRESENT: ICD-10-PCS | Mod: CPTII,S$GLB,, | Performed by: INTERNAL MEDICINE

## 2022-02-17 PROCEDURE — 80053 COMPREHEN METABOLIC PANEL: CPT | Mod: PN | Performed by: INTERNAL MEDICINE

## 2022-02-17 PROCEDURE — 85025 COMPLETE CBC W/AUTO DIFF WBC: CPT | Mod: PN | Performed by: INTERNAL MEDICINE

## 2022-02-17 PROCEDURE — 99999 PR PBB SHADOW E&M-EST. PATIENT-LVL III: CPT | Mod: PBBFAC,,, | Performed by: INTERNAL MEDICINE

## 2022-02-17 PROCEDURE — 1159F PR MEDICATION LIST DOCUMENTED IN MEDICAL RECORD: ICD-10-PCS | Mod: CPTII,S$GLB,, | Performed by: INTERNAL MEDICINE

## 2022-02-17 PROCEDURE — 3288F FALL RISK ASSESSMENT DOCD: CPT | Mod: CPTII,S$GLB,, | Performed by: INTERNAL MEDICINE

## 2022-02-17 PROCEDURE — 36415 COLL VENOUS BLD VENIPUNCTURE: CPT | Mod: PN | Performed by: INTERNAL MEDICINE

## 2022-02-17 NOTE — Clinical Note
The patient needs a CBC, CMP and CPK the week of 3/28/22 with an appt with me after the labs are done.

## 2022-02-23 DIAGNOSIS — C44.311 BASAL CELL CARCINOMA OF NOSE: ICD-10-CM

## 2022-02-28 ENCOUNTER — SPECIALTY PHARMACY (OUTPATIENT)
Dept: PHARMACY | Facility: CLINIC | Age: 67
End: 2022-02-28
Payer: MEDICARE

## 2022-02-28 NOTE — TELEPHONE ENCOUNTER
Specialty Pharmacy - Refill Coordination    Specialty Medication Orders Linked to Encounter    Flowsheet Row Most Recent Value   Medication #1 sonidegib 200 mg Cap (Order#[id number], Rx#[id number])          Refill Questions - Documented Responses    Flowsheet Row Most Recent Value   Patient Availability and HIPAA Verification    Does patient want to proceed with activity? Yes   HIPAA/medical authority confirmed? Yes   Relationship to patient of person spoken to? Self   Refill Screening Questions    Changes to allergies? No   Changes to medications? No   New conditions since last clinic visit? No   Unplanned office visit, urgent care, ED, or hospital admission in the last 4 weeks? No   How does patient/caregiver feel medication is working? Good   Financial problems or insurance changes? No   How many doses of your specialty medications were missed in the last 4 weeks? 0   Would patient like to speak to a pharmacist? No   When does the patient need to receive the medication? 03/03/22   Refill Delivery Questions    How will the patient receive the medication? Delivery Elle   When does the patient need to receive the medication? 03/03/22   Shipping Address Home   Address in [facility] confirmed and updated if neccessary? Yes   Expected Copay ($) 0   Is the patient able to afford the medication copay? Yes   Payment Method zero copay   Days supply of Refill 30   Supplies needed? No supplies needed   Refill activity completed? Yes   Refill activity plan Refill scheduled   Shipment/Pickup Date: 03/03/22          Current Outpatient Medications   Medication Sig    atorvastatin (LIPITOR) 40 MG tablet Take 40 mg by mouth once daily.    glipiZIDE (GLUCOTROL) 5 MG tablet Take 2 tablets (10 mg total) by mouth 2 (two) times daily with meals.    lancets (ACCU-CHEK MULTICLIX LANCET) Misc 1 lancet by Misc.(Non-Drug; Combo Route) route 2 (two) times daily.    metFORMIN (GLUCOPHAGE) 500 MG tablet Take 500 mg by mouth 2 (two)  times daily.    sonidegib 200 mg Cap Take 1 capsule (200 mg) by mouth once daily.   Last reviewed on 2/17/2022  1:46 PM by Margarita Roa MA    Review of patient's allergies indicates:  No Known Allergies Last reviewed on  2/23/2022 1:49 PM by Mary Argueta      Tasks added this encounter   3/26/2022 - Refill Call (Auto Added)   Tasks due within next 3 months   4/14/2022 - Clinical - Follow Up Assesement (90 day)     Mili Sun, PharmD  [facility] - Specialty Pharmacy  [address] [place] [id number]  Phone: [phone]  Fax: [phone]

## 2022-03-10 NOTE — PROGRESS NOTES
Date of Encounter: 4/20/2021  Provider: Estelle Perez MD  Referring MD: Norm Melton MD  PCP: Escobar Hoffmann MD  Derm: Althea Archibald MD; Norm Melton MD  Cardiology:    CC:  BCCA nasal tip    HPI:      Patient is a 66 for reconstruction status post future Moh's surgery for basal cell carcinoma involving the left nasal tip.  Patient reports history of this 2 years ago at which time he was seen by Dr. Ochoa. He was treated with Odomzo 200 mg X 3 months which completely cleared the lesion.  Lesion on his left nasal tip started to recur about 8 months ago and he was ultimately seen by Dr. Archibald who performed a biopsy which was positive for basal cell CA.  Dr. Melton recommended surgical excision via Moh's micro surgery follow with reconstruction.    Patient denies any pain.  He informed me today that he does not wish to have surgery but would like to explore taking the hedgehog inhibitor again.    10/19/2021   Patient is here today for follow-up basal cell carcinoma involving the left nasal tip  No complaints  No new lesions  He is inquiring about reconstructive surgery  Patient continues to receive a hedgehog inhibitor--one pill a day X 6 months    1/20/2022  Patient is here today to discuss nasal reconstruction.  He is status post treatment with a hedgehog inhibitor with no evidence of gross disease.  He was seen by Dr. Melton to discuss Moh's micro surgery. Patient wishes to proceed with Moh's.  Patient has no new complaints.    2/15/2022  Here today status post Mohs procedure to discuss reconstruction tomorrow .    ROS: see HPI  Constitutional: Negative for activity change and appetite change, weight loss.   Eyes: Negative for discharge, visual changes.   Respiratory: Negative for difficulty breathing and wheezing   Cardiovascular: Negative for chest pain.   Gastrointestinal: Negative for abdominal distention and abdominal pain.   Endocrine: Negative for cold intolerance and heat intolerance.    Genitourinary: Negative for dysuria.   Musculoskeletal: Negative for gait problem, muscle pain and joint swelling.   Skin: Negative for color change and pallor; negative for skin lesions.   Neurological: Negative for syncope and weakness; no numbness face.   Psychiatric/Behavioral: Negative for agitation and confusion; negative for depression.    Physical Exam:     Nose  · External Nose:  left dong tip and columella defect; 1/2 left alar defect; minimal nasal dorsal defect; through and through defect involving tip and ala and skin anterior nares just anterior to caudal septum-photos taken      PATH = basal cell carcinoma; pathology accession #[id number]-, [facility]         Assessment:   S/P Moh's with large nasal defect, through and through  BCCA left nasal tip and ala, S/P Odmozo with great clinical response--no visible tumor    Plan:  Forehead flap, conchal cartilage flap and mucosal flaps, alloderm tomorrow 3/16  Wound redressed  Pain med rx given  He is to see Dr Nguyen 2 weeks after surgery

## 2022-03-14 ENCOUNTER — TELEPHONE (OUTPATIENT)
Dept: DERMATOLOGY | Facility: CLINIC | Age: 67
End: 2022-03-14
Payer: MEDICARE

## 2022-03-14 NOTE — PROGRESS NOTES
Prior photo(s) of site(s) to confirm location(s):      Defibrillator: No  Pacemaker: No  Artificial heart valves: No  Artificial joints: No    ALLERGIES:   Patient has no known allergies.      Current Outpatient Medications:     atorvastatin (LIPITOR) 40 MG tablet, Take 40 mg by mouth once daily., Disp: , Rfl:     glipiZIDE (GLUCOTROL) 5 MG tablet, Take 2 tablets (10 mg total) by mouth 2 (two) times daily with meals., Disp: 120 tablet, Rfl: 11    lancets (ACCU-CHEK MULTICLIX LANCET) Misc, 1 lancet by Misc.(Non-Drug; Combo Route) route 2 (two) times daily., Disp: 100 each, Rfl: 0    metFORMIN (GLUCOPHAGE) 500 MG tablet, Take 500 mg by mouth 2 (two) times daily., Disp: , Rfl:     sonidegib 200 mg Cap, Take 1 capsule (200 mg) by mouth once daily., Disp: 30 capsule, Rfl: 3  -------------------------------------------------------------  PROCEDURE: Mohs' Micrographic Surgery    SITE: left nose    INDICATION: basal cell carcinoma, recurrent, in an area at increased risk of recurrence    CASE NUMBER: [id number]      ANESTHETIC: 11 mL 1% Lidocaine with Epinephrine 1:100,000 and    3 mL of 0.5% bupivicaine    SURGICAL PREP: Ethanol and ophthalmic Betadine    SURGEON: Norm Melton MD    ASSISTANTS: Jaylen Alfred CST     PREOPERATIVE DIAGNOSIS: basal cell carcinoma    POSTOPERATIVE DIAGNOSIS: basal cell carcinoma    PATHOLOGIC DIAGNOSIS: basal cell carcinoma    STAGES OF MOHS' SURGERY PERFORMED: two    TUMOR-FREE PLANE ACHIEVED: yes    HEMOSTASIS: Hyfrecation     SPECIMENS: four (three in stage A, one in stage B)    INITIAL LESION SIZE: 2.2 x 2.8 cm    FINAL DEFECT SIZE: 2.5 x 3.1 cm    WOUND REPAIR/DISPOSITION: see below    NARRATIVE:  The patient is a 67 y.o.male referred by Althea Archibald MD with a history of cancer on the left lower nose which was biopsied - pathology accession #[id number]-, [facility]. Findings revealed basal cell carcinoma. Examination revealed a through and through defect and  surrounding scar to the left lower nose at the site of prior biopsy, which was confirmed by reference to the photograph taken at the previous patient visit, as attached above. In light of the recurrent nature of this tumor and the location on the nose, Mohs' micrographic surgery was thought to be the most appropriate management choice, and this diagnosis is appropriate for treatment by Mohs' micrographic surgery.  I discussed it with the patient and he fully understands the aims, risks, alternatives, and possible complications, and elects to proceed.  There are no medical or surgical contraindications to the procedure.     A signed informed consent was obtained.    PROCEDURE:  The patient was placed in the semi-recumbent position on the operating table in the Mohs' Surgery Suite. The area in question was thoroughly prepped with ethanol and ophthalmic Betadine. A sterile surgical marker was used to outline the clinically apparent margins of the involved area, and a narrow margin of normal-appearing skin. Reference marks were made at the periphery of the outlined area with the surgical marker. The proposed area of excision was measured and photographed. Local anesthesia as noted above was administered.  The total volume of anesthetic used throughout this portion of the procedure was as documented above. The area was prepared and draped in the standard manner. All of the grossly identifiable area of clinically abnormal tissue was removed/debulked with a #15 blade, and an underlying/peripheral layer was taken and processed by the Mohs' technique.  Hemostasis was obtained with the hyfrecator. Tissue was taken from any areas of residual marginal involvement (if present) and processed by the Mohs' technique in as many stages as needed until a tumor-free plane was achieved.    Colors of inks used in the reference nicks at epidermal margins (if present) and/or inking of non-epithelial edges, if applicable, is represented on  "the Mohs map as follows: solid lines represent red ink, dots represent blue ink, jagged lines represent black ink, curlicues represent green ink, "xxx" represents yellow ink.    The first Mohs' layer consisted of three section(s) with 15 slide(s) evaluated. Some residual tumor was noted at the margins of the first Mohs' layer. Histology of the specimen(s) showed a focus of nodular basal cell carcinoma, manifested as a nest of basaloid cells with hyperchromatic nuclei, peripheral palisading, and artifactual clefting around the nest, near the blue-inked edge on section two, as noted on the Mohs map, and, on later cuts, a focus of infiltrative basal cell carcinoma, manifested as irregularly-shaped and angulated nests and strands of basaloid cells with hyperchromatic nuclei, peripheral palisading, and artifactual clefting around the nests/strands and some lymphocytic inflammatory infiltrate in the surrounding tissues near the black-inked edge as noted on the Mohs' map; multiple earlier cuts were clear in this area.    The second Mohs' layer consisted of one section(s) with 3 slide(s) evaluated. No residual tumor was noted at the margins of the second Mohs' layer.    A total of four section(s) and 18 slide(s) were examined under the microscope via the Mohs technique.  A cancer free plane was reached after layer number two. Defect final size was as noted above.      The wound was covered with a nonadherent dressing between stages, and the patient allowed to wait in the waiting area during these periods. The final defect was photographed at the completion of the Mohs' procedure.    The patient was returned to the procedure room following completion of the Mohs' procedure and final slide review. He is scheduled to see Estelle Perez MD preoperatively this afternoon and for repair of the defect tomorrow as previously arranged.    Final dressing consisted of petrolatum gauze, dry gauze and tape.    Estimated blood loss for the " total procedure was less than 10 mL.    Total operative time including tissue processing in the Mohs' laboratory and microscopic Mohs' frozen section slide review was 3.5 hour(s). Verbal and written wound care instructions were given to the patient, and he expressed understanding of these instructions. The patient tolerated the procedure well and left the operating room in good condition; he is to return to me in 6-8 weeks for followup.

## 2022-03-15 ENCOUNTER — PROCEDURE VISIT (OUTPATIENT)
Dept: DERMATOLOGY | Facility: CLINIC | Age: 67
End: 2022-03-15
Payer: MEDICARE

## 2022-03-15 ENCOUNTER — OFFICE VISIT (OUTPATIENT)
Dept: HEMATOLOGY/ONCOLOGY | Facility: CLINIC | Age: 67
End: 2022-03-15
Payer: MEDICARE

## 2022-03-15 ENCOUNTER — TELEPHONE (OUTPATIENT)
Dept: HEMATOLOGY/ONCOLOGY | Facility: CLINIC | Age: 67
End: 2022-03-15
Payer: MEDICARE

## 2022-03-15 VITALS
TEMPERATURE: 99 F | WEIGHT: 195.56 LBS | HEIGHT: 71 IN | OXYGEN SATURATION: 99 % | DIASTOLIC BLOOD PRESSURE: 77 MMHG | BODY MASS INDEX: 27.38 KG/M2 | RESPIRATION RATE: 18 BRPM | HEART RATE: 63 BPM | SYSTOLIC BLOOD PRESSURE: 152 MMHG

## 2022-03-15 VITALS
HEIGHT: 71 IN | HEART RATE: 51 BPM | WEIGHT: 190 LBS | DIASTOLIC BLOOD PRESSURE: 71 MMHG | BODY MASS INDEX: 26.6 KG/M2 | SYSTOLIC BLOOD PRESSURE: 172 MMHG

## 2022-03-15 DIAGNOSIS — G89.18 POST-OP PAIN: Primary | ICD-10-CM

## 2022-03-15 DIAGNOSIS — C44.311 BASAL CELL CARCINOMA OF NOSE: ICD-10-CM

## 2022-03-15 DIAGNOSIS — C44.311 BASAL CELL CARCINOMA OF NOSE: Primary | ICD-10-CM

## 2022-03-15 DIAGNOSIS — Z98.890 MOHS DEFECT: ICD-10-CM

## 2022-03-15 DIAGNOSIS — L98.8 MOHS DEFECT: ICD-10-CM

## 2022-03-15 PROCEDURE — 99213 OFFICE O/P EST LOW 20 MIN: CPT | Mod: S$GLB,,, | Performed by: OTOLARYNGOLOGY

## 2022-03-15 PROCEDURE — 17311: ICD-10-PCS | Mod: S$GLB,,, | Performed by: DERMATOLOGY

## 2022-03-15 PROCEDURE — 3051F PR MOST RECENT HEMOGLOBIN A1C LEVEL 7.0 - < 8.0%: ICD-10-PCS | Mod: CPTII,S$GLB,, | Performed by: OTOLARYNGOLOGY

## 2022-03-15 PROCEDURE — 99213 PR OFFICE/OUTPT VISIT, EST, LEVL III, 20-29 MIN: ICD-10-PCS | Mod: S$GLB,,, | Performed by: OTOLARYNGOLOGY

## 2022-03-15 PROCEDURE — 17311 MOHS 1 STAGE H/N/HF/G: CPT | Mod: S$GLB,,, | Performed by: DERMATOLOGY

## 2022-03-15 PROCEDURE — 17312 MOHS ADDL STAGE: CPT | Mod: S$GLB,,, | Performed by: DERMATOLOGY

## 2022-03-15 PROCEDURE — 3288F FALL RISK ASSESSMENT DOCD: CPT | Mod: CPTII,S$GLB,, | Performed by: OTOLARYNGOLOGY

## 2022-03-15 PROCEDURE — 99999 PR PBB SHADOW E&M-EST. PATIENT-LVL IV: ICD-10-PCS | Mod: PBBFAC,,, | Performed by: OTOLARYNGOLOGY

## 2022-03-15 PROCEDURE — 1159F PR MEDICATION LIST DOCUMENTED IN MEDICAL RECORD: ICD-10-PCS | Mod: CPTII,S$GLB,, | Performed by: OTOLARYNGOLOGY

## 2022-03-15 PROCEDURE — 1101F PT FALLS ASSESS-DOCD LE1/YR: CPT | Mod: CPTII,S$GLB,, | Performed by: OTOLARYNGOLOGY

## 2022-03-15 PROCEDURE — 1159F MED LIST DOCD IN RCRD: CPT | Mod: CPTII,S$GLB,, | Performed by: OTOLARYNGOLOGY

## 2022-03-15 PROCEDURE — 1101F PR PT FALLS ASSESS DOC 0-1 FALLS W/OUT INJ PAST YR: ICD-10-PCS | Mod: CPTII,S$GLB,, | Performed by: OTOLARYNGOLOGY

## 2022-03-15 PROCEDURE — 1125F AMNT PAIN NOTED PAIN PRSNT: CPT | Mod: CPTII,S$GLB,, | Performed by: OTOLARYNGOLOGY

## 2022-03-15 PROCEDURE — 17312: ICD-10-PCS | Mod: S$GLB,,, | Performed by: DERMATOLOGY

## 2022-03-15 PROCEDURE — 3077F PR MOST RECENT SYSTOLIC BLOOD PRESSURE >= 140 MM HG: ICD-10-PCS | Mod: CPTII,S$GLB,, | Performed by: OTOLARYNGOLOGY

## 2022-03-15 PROCEDURE — 99499 UNLISTED E&M SERVICE: CPT | Mod: S$GLB,,, | Performed by: DERMATOLOGY

## 2022-03-15 PROCEDURE — 3008F BODY MASS INDEX DOCD: CPT | Mod: CPTII,S$GLB,, | Performed by: OTOLARYNGOLOGY

## 2022-03-15 PROCEDURE — 99499 NO LOS: ICD-10-PCS | Mod: S$GLB,,, | Performed by: DERMATOLOGY

## 2022-03-15 PROCEDURE — 3077F SYST BP >= 140 MM HG: CPT | Mod: CPTII,S$GLB,, | Performed by: OTOLARYNGOLOGY

## 2022-03-15 PROCEDURE — 3008F PR BODY MASS INDEX (BMI) DOCUMENTED: ICD-10-PCS | Mod: CPTII,S$GLB,, | Performed by: OTOLARYNGOLOGY

## 2022-03-15 PROCEDURE — 99999 PR PBB SHADOW E&M-EST. PATIENT-LVL IV: CPT | Mod: PBBFAC,,, | Performed by: OTOLARYNGOLOGY

## 2022-03-15 PROCEDURE — 3051F HG A1C>EQUAL 7.0%<8.0%: CPT | Mod: CPTII,S$GLB,, | Performed by: OTOLARYNGOLOGY

## 2022-03-15 PROCEDURE — 3078F PR MOST RECENT DIASTOLIC BLOOD PRESSURE < 80 MM HG: ICD-10-PCS | Mod: CPTII,S$GLB,, | Performed by: OTOLARYNGOLOGY

## 2022-03-15 PROCEDURE — 1125F PR PAIN SEVERITY QUANTIFIED, PAIN PRESENT: ICD-10-PCS | Mod: CPTII,S$GLB,, | Performed by: OTOLARYNGOLOGY

## 2022-03-15 PROCEDURE — 3288F PR FALLS RISK ASSESSMENT DOCUMENTED: ICD-10-PCS | Mod: CPTII,S$GLB,, | Performed by: OTOLARYNGOLOGY

## 2022-03-15 PROCEDURE — 3078F DIAST BP <80 MM HG: CPT | Mod: CPTII,S$GLB,, | Performed by: OTOLARYNGOLOGY

## 2022-03-15 RX ORDER — HYDROCODONE BITARTRATE AND ACETAMINOPHEN 10; 325 MG/1; MG/1
1 TABLET ORAL EVERY 6 HOURS PRN
Qty: 28 TABLET | Refills: 0 | Status: SHIPPED | OUTPATIENT
Start: 2022-03-15 | End: 2022-03-22

## 2022-03-15 NOTE — TELEPHONE ENCOUNTER
Left message to call the office to schedule/reschedule appt. Recall number provided.  ----- Message from Raquel Haskins MA sent at 3/15/2022  3:57 PM CDT -----    ----- Message -----  From: Kike Nguyen MD  Sent: 3/15/2022   3:56 PM CDT  To: Wendy ANDREW Staff    The patient's appt with me on 3/28/22 and labs can be cancelled.  He needs an appt with me in 4 weeks with CBC, CMP, and CPK prior to the appt.

## 2022-03-16 ENCOUNTER — TELEPHONE (OUTPATIENT)
Dept: HEMATOLOGY/ONCOLOGY | Facility: CLINIC | Age: 67
End: 2022-03-16
Payer: MEDICARE

## 2022-03-16 NOTE — TELEPHONE ENCOUNTER
----- Message from Estelle Perez MD sent at 3/16/2022 12:19 PM CDT -----  Need to see him at 8:15 tomorrow morning to change dressing and to look at flap  Please put him as a nurses visit  Thanks

## 2022-03-18 ENCOUNTER — CLINICAL SUPPORT (OUTPATIENT)
Dept: HEMATOLOGY/ONCOLOGY | Facility: CLINIC | Age: 67
End: 2022-03-18
Payer: MEDICARE

## 2022-03-18 PROCEDURE — 99999 PR PBB SHADOW E&M-EST. PATIENT-LVL III: ICD-10-PCS | Mod: PBBFAC,,,

## 2022-03-18 PROCEDURE — 99999 PR PBB SHADOW E&M-EST. PATIENT-LVL III: CPT | Mod: PBBFAC,,,

## 2022-03-18 NOTE — PROGRESS NOTES
Pt s/p surgery with Dr. Perez on 3/16/22 for reconstruction post Moh's surgery for basal cell carcinoma involving the left nasal tip.  Pt and wife in today for wound care education.  Wounds to bilateral ears with dressings dry and intact, sutures intact.  Wound to forehead extending to nose with ointment and xeroform gauze in place.  Graft to nose warm and pink, sutures intact, blanches to touch.  Pt states that he has pain rated 2/10 at the tip of his nose.  Wife verbally instructed step by step on cleaning and dressing forehead and nasal wounds.  Wife able to complete task without difficulty.  Pt very encouraging to wife during dressing change and tolerated procedure well.  Wife stated that she will be able to do daily dressing changes.  Encouraged to call clinic for any questions or concerns.  Pt has follow up appointment with Dr. Perez on 3/24/22.

## 2022-03-23 NOTE — PROGRESS NOTES
Date of Encounter: 4/20/2021  Provider: Estelle Perez MD  Referring MD: Norm Melton MD  PCP: Escobar Hoffmann MD  Derm: Althea Archibald MD; Norm Melton MD  Cardiology:    CC:  BCCA nasal tip    HPI:      Patient is a 66 for reconstruction status post future Moh's surgery for basal cell carcinoma involving the left nasal tip.  Patient reports history of this 2 years ago at which time he was seen by Dr. Ochoa. He was treated with Odomzo 200 mg X 3 months which completely cleared the lesion.  Lesion on his left nasal tip started to recur about 8 months ago and he was ultimately seen by Dr. Archibald who performed a biopsy which was positive for basal cell CA.  Dr. Melton recommended surgical excision via Moh's micro surgery follow with reconstruction.    Patient denies any pain.  He informed me today that he does not wish to have surgery but would like to explore taking the hedgehog inhibitor again.    10/19/2021   Patient is here today for follow-up basal cell carcinoma involving the left nasal tip  No complaints  No new lesions  He is inquiring about reconstructive surgery  Patient continues to receive a hedgehog inhibitor--one pill a day X 6 months    1/20/2022  Patient is here today to discuss nasal reconstruction.  He is status post treatment with a hedgehog inhibitor with no evidence of gross disease.  He was seen by Dr. Melton to discuss Moh's micro surgery. Patient wishes to proceed with Moh's.  Patient has no new complaints.    2/15/2022  Here today status post Mohs procedure to discuss reconstruction tomorrow .    03/24/2022  Patient is here today for suture removal status post bilateral tenisha cartilage grafts, a mucosal graft and right paramedian forehead flap for a large complex nasal defect.  He has no new complaints    ROS: see HPI  Constitutional: Negative for activity change and appetite change, weight loss.   Eyes: Negative for discharge, visual changes.   Respiratory: Negative for  difficulty breathing and wheezing   Cardiovascular: Negative for chest pain.   Gastrointestinal: Negative for abdominal distention and abdominal pain.   Endocrine: Negative for cold intolerance and heat intolerance.   Genitourinary: Negative for dysuria.   Musculoskeletal: Negative for gait problem, muscle pain and joint swelling.   Skin: Negative for color change and pallor; negative for skin lesions.   Neurological: Negative for syncope and weakness; no numbness face.   Psychiatric/Behavioral: Negative for agitation and confusion; negative for depression.    Physical Exam:     Forehead     Suture line intact; defect down to periosteum about 2 X 1.5 cm--clean  External Nose:  Suture line intact; flap viable except for necrosis 2 X 3 mm area of columellaE  Ears: bolsters in place       Assessment:   S/P Moh's reconstruction for large nasal defect. Very minimal necrosis of distal flap at columella  Reconstruction of the soft triangle will need to be revised.  It is retracted compared to the contralateral side.      Plan:  Sutures removed: small dehiscence of suture line right conchal bowl and right nasal ala  Nasal ala re-stitched with 6-0 nylon  Mastisol and steri strips placed  F/U in 10 days  Plan revision 4/6 in OR  Intermediate phase of reconstruction in 2 weeks.  The pedicle will remain intact and the flap will be removed from the tissue bed, thinned and replaced after an additional piece of cartilage will be added to reconstruct the nasal rim and soft triangle

## 2022-03-24 ENCOUNTER — OFFICE VISIT (OUTPATIENT)
Dept: HEMATOLOGY/ONCOLOGY | Facility: CLINIC | Age: 67
End: 2022-03-24
Payer: MEDICARE

## 2022-03-24 VITALS
HEIGHT: 71 IN | SYSTOLIC BLOOD PRESSURE: 181 MMHG | HEART RATE: 58 BPM | TEMPERATURE: 98 F | WEIGHT: 193.31 LBS | RESPIRATION RATE: 18 BRPM | DIASTOLIC BLOOD PRESSURE: 71 MMHG | OXYGEN SATURATION: 98 % | BODY MASS INDEX: 27.06 KG/M2

## 2022-03-24 DIAGNOSIS — Z98.890 MOHS DEFECT: Primary | ICD-10-CM

## 2022-03-24 DIAGNOSIS — L98.8 MOHS DEFECT: Primary | ICD-10-CM

## 2022-03-24 PROCEDURE — 3288F PR FALLS RISK ASSESSMENT DOCUMENTED: ICD-10-PCS | Mod: CPTII,S$GLB,, | Performed by: OTOLARYNGOLOGY

## 2022-03-24 PROCEDURE — 1159F PR MEDICATION LIST DOCUMENTED IN MEDICAL RECORD: ICD-10-PCS | Mod: CPTII,S$GLB,, | Performed by: OTOLARYNGOLOGY

## 2022-03-24 PROCEDURE — 3051F HG A1C>EQUAL 7.0%<8.0%: CPT | Mod: CPTII,S$GLB,, | Performed by: OTOLARYNGOLOGY

## 2022-03-24 PROCEDURE — 3288F FALL RISK ASSESSMENT DOCD: CPT | Mod: CPTII,S$GLB,, | Performed by: OTOLARYNGOLOGY

## 2022-03-24 PROCEDURE — 1101F PR PT FALLS ASSESS DOC 0-1 FALLS W/OUT INJ PAST YR: ICD-10-PCS | Mod: CPTII,S$GLB,, | Performed by: OTOLARYNGOLOGY

## 2022-03-24 PROCEDURE — 1126F PR PAIN SEVERITY QUANTIFIED, NO PAIN PRESENT: ICD-10-PCS | Mod: CPTII,S$GLB,, | Performed by: OTOLARYNGOLOGY

## 2022-03-24 PROCEDURE — 1101F PT FALLS ASSESS-DOCD LE1/YR: CPT | Mod: CPTII,S$GLB,, | Performed by: OTOLARYNGOLOGY

## 2022-03-24 PROCEDURE — 3051F PR MOST RECENT HEMOGLOBIN A1C LEVEL 7.0 - < 8.0%: ICD-10-PCS | Mod: CPTII,S$GLB,, | Performed by: OTOLARYNGOLOGY

## 2022-03-24 PROCEDURE — 3078F PR MOST RECENT DIASTOLIC BLOOD PRESSURE < 80 MM HG: ICD-10-PCS | Mod: CPTII,S$GLB,, | Performed by: OTOLARYNGOLOGY

## 2022-03-24 PROCEDURE — 3078F DIAST BP <80 MM HG: CPT | Mod: CPTII,S$GLB,, | Performed by: OTOLARYNGOLOGY

## 2022-03-24 PROCEDURE — 3077F SYST BP >= 140 MM HG: CPT | Mod: CPTII,S$GLB,, | Performed by: OTOLARYNGOLOGY

## 2022-03-24 PROCEDURE — 99024 PR POST-OP FOLLOW-UP VISIT: ICD-10-PCS | Mod: S$GLB,,, | Performed by: OTOLARYNGOLOGY

## 2022-03-24 PROCEDURE — 1159F MED LIST DOCD IN RCRD: CPT | Mod: CPTII,S$GLB,, | Performed by: OTOLARYNGOLOGY

## 2022-03-24 PROCEDURE — 1126F AMNT PAIN NOTED NONE PRSNT: CPT | Mod: CPTII,S$GLB,, | Performed by: OTOLARYNGOLOGY

## 2022-03-24 PROCEDURE — 3008F BODY MASS INDEX DOCD: CPT | Mod: CPTII,S$GLB,, | Performed by: OTOLARYNGOLOGY

## 2022-03-24 PROCEDURE — 99999 PR PBB SHADOW E&M-EST. PATIENT-LVL IV: ICD-10-PCS | Mod: PBBFAC,,, | Performed by: OTOLARYNGOLOGY

## 2022-03-24 PROCEDURE — 99024 POSTOP FOLLOW-UP VISIT: CPT | Mod: S$GLB,,, | Performed by: OTOLARYNGOLOGY

## 2022-03-24 PROCEDURE — 99999 PR PBB SHADOW E&M-EST. PATIENT-LVL IV: CPT | Mod: PBBFAC,,, | Performed by: OTOLARYNGOLOGY

## 2022-03-24 PROCEDURE — 3008F PR BODY MASS INDEX (BMI) DOCUMENTED: ICD-10-PCS | Mod: CPTII,S$GLB,, | Performed by: OTOLARYNGOLOGY

## 2022-03-24 PROCEDURE — 3077F PR MOST RECENT SYSTOLIC BLOOD PRESSURE >= 140 MM HG: ICD-10-PCS | Mod: CPTII,S$GLB,, | Performed by: OTOLARYNGOLOGY

## 2022-03-26 ENCOUNTER — SPECIALTY PHARMACY (OUTPATIENT)
Dept: PHARMACY | Facility: CLINIC | Age: 67
End: 2022-03-26
Payer: MEDICARE

## 2022-04-04 ENCOUNTER — OFFICE VISIT (OUTPATIENT)
Dept: HEMATOLOGY/ONCOLOGY | Facility: CLINIC | Age: 67
End: 2022-04-04
Payer: MEDICARE

## 2022-04-04 VITALS
SYSTOLIC BLOOD PRESSURE: 180 MMHG | HEIGHT: 71 IN | RESPIRATION RATE: 18 BRPM | TEMPERATURE: 98 F | BODY MASS INDEX: 26.57 KG/M2 | DIASTOLIC BLOOD PRESSURE: 80 MMHG | HEART RATE: 68 BPM | OXYGEN SATURATION: 97 % | WEIGHT: 189.81 LBS

## 2022-04-04 DIAGNOSIS — Z98.890 MOHS DEFECT: ICD-10-CM

## 2022-04-04 DIAGNOSIS — C44.311 BASAL CELL CARCINOMA OF NOSE: Primary | ICD-10-CM

## 2022-04-04 DIAGNOSIS — L98.8 MOHS DEFECT: ICD-10-CM

## 2022-04-04 PROCEDURE — 99024 POSTOP FOLLOW-UP VISIT: CPT | Mod: S$GLB,,, | Performed by: OTOLARYNGOLOGY

## 2022-04-04 PROCEDURE — 1159F PR MEDICATION LIST DOCUMENTED IN MEDICAL RECORD: ICD-10-PCS | Mod: CPTII,S$GLB,, | Performed by: OTOLARYNGOLOGY

## 2022-04-04 PROCEDURE — 3008F BODY MASS INDEX DOCD: CPT | Mod: CPTII,S$GLB,, | Performed by: OTOLARYNGOLOGY

## 2022-04-04 PROCEDURE — 3051F PR MOST RECENT HEMOGLOBIN A1C LEVEL 7.0 - < 8.0%: ICD-10-PCS | Mod: CPTII,S$GLB,, | Performed by: OTOLARYNGOLOGY

## 2022-04-04 PROCEDURE — 99999 PR PBB SHADOW E&M-EST. PATIENT-LVL IV: CPT | Mod: PBBFAC,,, | Performed by: OTOLARYNGOLOGY

## 2022-04-04 PROCEDURE — 3051F HG A1C>EQUAL 7.0%<8.0%: CPT | Mod: CPTII,S$GLB,, | Performed by: OTOLARYNGOLOGY

## 2022-04-04 PROCEDURE — 1160F RVW MEDS BY RX/DR IN RCRD: CPT | Mod: CPTII,S$GLB,, | Performed by: OTOLARYNGOLOGY

## 2022-04-04 PROCEDURE — 3079F DIAST BP 80-89 MM HG: CPT | Mod: CPTII,S$GLB,, | Performed by: OTOLARYNGOLOGY

## 2022-04-04 PROCEDURE — 1125F AMNT PAIN NOTED PAIN PRSNT: CPT | Mod: CPTII,S$GLB,, | Performed by: OTOLARYNGOLOGY

## 2022-04-04 PROCEDURE — 1160F PR REVIEW ALL MEDS BY PRESCRIBER/CLIN PHARMACIST DOCUMENTED: ICD-10-PCS | Mod: CPTII,S$GLB,, | Performed by: OTOLARYNGOLOGY

## 2022-04-04 PROCEDURE — 1125F PR PAIN SEVERITY QUANTIFIED, PAIN PRESENT: ICD-10-PCS | Mod: CPTII,S$GLB,, | Performed by: OTOLARYNGOLOGY

## 2022-04-04 PROCEDURE — 99999 PR PBB SHADOW E&M-EST. PATIENT-LVL IV: ICD-10-PCS | Mod: PBBFAC,,, | Performed by: OTOLARYNGOLOGY

## 2022-04-04 PROCEDURE — 1101F PR PT FALLS ASSESS DOC 0-1 FALLS W/OUT INJ PAST YR: ICD-10-PCS | Mod: CPTII,S$GLB,, | Performed by: OTOLARYNGOLOGY

## 2022-04-04 PROCEDURE — 3288F FALL RISK ASSESSMENT DOCD: CPT | Mod: CPTII,S$GLB,, | Performed by: OTOLARYNGOLOGY

## 2022-04-04 PROCEDURE — 3288F PR FALLS RISK ASSESSMENT DOCUMENTED: ICD-10-PCS | Mod: CPTII,S$GLB,, | Performed by: OTOLARYNGOLOGY

## 2022-04-04 PROCEDURE — 1159F MED LIST DOCD IN RCRD: CPT | Mod: CPTII,S$GLB,, | Performed by: OTOLARYNGOLOGY

## 2022-04-04 PROCEDURE — 3079F PR MOST RECENT DIASTOLIC BLOOD PRESSURE 80-89 MM HG: ICD-10-PCS | Mod: CPTII,S$GLB,, | Performed by: OTOLARYNGOLOGY

## 2022-04-04 PROCEDURE — 3008F PR BODY MASS INDEX (BMI) DOCUMENTED: ICD-10-PCS | Mod: CPTII,S$GLB,, | Performed by: OTOLARYNGOLOGY

## 2022-04-04 PROCEDURE — 1101F PT FALLS ASSESS-DOCD LE1/YR: CPT | Mod: CPTII,S$GLB,, | Performed by: OTOLARYNGOLOGY

## 2022-04-04 PROCEDURE — 3077F PR MOST RECENT SYSTOLIC BLOOD PRESSURE >= 140 MM HG: ICD-10-PCS | Mod: CPTII,S$GLB,, | Performed by: OTOLARYNGOLOGY

## 2022-04-04 PROCEDURE — 3077F SYST BP >= 140 MM HG: CPT | Mod: CPTII,S$GLB,, | Performed by: OTOLARYNGOLOGY

## 2022-04-04 PROCEDURE — 99024 PR POST-OP FOLLOW-UP VISIT: ICD-10-PCS | Mod: S$GLB,,, | Performed by: OTOLARYNGOLOGY

## 2022-04-04 NOTE — PROGRESS NOTES
Date of Encounter: 4/20/2021  Provider: Estelle Perez MD  Referring MD: Norm Melton MD  PCP: Escobar Hoffmann MD  Derm: Althea Archibald MD; Norm Melton MD  Cardiology:    CC:  BCCA nasal tip    HPI:      Patient is a 66 for reconstruction status post future Moh's surgery for basal cell carcinoma involving the left nasal tip.  Patient reports history of this 2 years ago at which time he was seen by Dr. Ochoa. He was treated with Odomzo 200 mg X 3 months which completely cleared the lesion.  Lesion on his left nasal tip started to recur about 8 months ago and he was ultimately seen by Dr. Archibald who performed a biopsy which was positive for basal cell CA.  Dr. Melton recommended surgical excision via Moh's micro surgery follow with reconstruction.    Patient denies any pain.  He informed me today that he does not wish to have surgery but would like to explore taking the hedgehog inhibitor again.    10/19/2021   Patient is here today for follow-up basal cell carcinoma involving the left nasal tip  No complaints  No new lesions  He is inquiring about reconstructive surgery  Patient continues to receive a hedgehog inhibitor--one pill a day X 6 months    1/20/2022  Patient is here today to discuss nasal reconstruction.  He is status post treatment with a hedgehog inhibitor with no evidence of gross disease.  He was seen by Dr. Melton to discuss Moh's micro surgery. Patient wishes to proceed with Moh's.  Patient has no new complaints.    2/15/2022  Here today status post Mohs procedure to discuss reconstruction tomorrow .    03/24/2022  Patient is here today for suture removal status post bilateral tenisha cartilage grafts, a mucosal graft and right paramedian forehead flap for a large complex nasal defect.  He has no new complaints    4/4/2022  Patient is here today for re-evaluation of forehead flap.  No complaints    ROS: see HPI  Constitutional: Negative for activity change and appetite change, weight  loss.   Eyes: Negative for discharge, visual changes.   Respiratory: Negative for difficulty breathing and wheezing   Cardiovascular: Negative for chest pain.   Gastrointestinal: Negative for abdominal distention and abdominal pain.   Endocrine: Negative for cold intolerance and heat intolerance.   Genitourinary: Negative for dysuria.   Musculoskeletal: Negative for gait problem, muscle pain and joint swelling.   Skin: Negative for color change and pallor; negative for skin lesions.   Neurological: Negative for syncope and weakness; no numbness face.   Psychiatric/Behavioral: Negative for agitation and confusion; negative for depression.    Physical Exam:     Forehead     Incision intact; defect down to periosteum about 2 X 1.5 cm--clean  External Nose:  Suture line intact; flap viable except for necrosis 2 X 3 mm area of columella--unchanged    Assessment:   S/P Moh's reconstruction for large nasal defect. Very minimal necrosis of distal flap at columella  Reconstruction of the soft triangle will need to be revised.  It is retracted compared to the contralateral side.    Plan:  Stage II of reconstruction will be done in the near future.

## 2022-04-11 ENCOUNTER — TELEPHONE (OUTPATIENT)
Dept: HEMATOLOGY/ONCOLOGY | Facility: CLINIC | Age: 67
End: 2022-04-11
Payer: MEDICARE

## 2022-04-11 DIAGNOSIS — L98.8 MOHS DEFECT: Primary | ICD-10-CM

## 2022-04-11 DIAGNOSIS — Z98.890 MOHS DEFECT: Primary | ICD-10-CM

## 2022-04-11 NOTE — TELEPHONE ENCOUNTER
Called and gave pt OR date with Dr. Perez. Pt given pre op instructions, PAT appointment day and time.  All questions answered to pt's satisfaction, encouraged to call for any additional questions/concerns.

## 2022-04-13 ENCOUNTER — TELEPHONE (OUTPATIENT)
Dept: HEMATOLOGY/ONCOLOGY | Facility: CLINIC | Age: 67
End: 2022-04-13
Payer: MEDICARE

## 2022-04-13 NOTE — TELEPHONE ENCOUNTER
Pt calling to review pre op instructions and appointments.  All questions answered to pt's satisfaction.

## 2022-04-18 DIAGNOSIS — G89.18 POST-OP PAIN: Primary | ICD-10-CM

## 2022-04-18 RX ORDER — HYDROCODONE BITARTRATE AND ACETAMINOPHEN 7.5; 325 MG/1; MG/1
1 TABLET ORAL EVERY 6 HOURS PRN
Qty: 28 TABLET | Refills: 0 | Status: SHIPPED | OUTPATIENT
Start: 2022-04-18 | End: 2022-04-25

## 2022-04-19 ENCOUNTER — OFFICE VISIT (OUTPATIENT)
Dept: OPTOMETRY | Facility: CLINIC | Age: 67
End: 2022-04-19
Payer: MEDICARE

## 2022-04-19 ENCOUNTER — TELEPHONE (OUTPATIENT)
Dept: HEMATOLOGY/ONCOLOGY | Facility: CLINIC | Age: 67
End: 2022-04-19
Payer: MEDICARE

## 2022-04-19 ENCOUNTER — TELEPHONE (OUTPATIENT)
Dept: OPHTHALMOLOGY | Facility: CLINIC | Age: 67
End: 2022-04-19
Payer: MEDICARE

## 2022-04-19 DIAGNOSIS — T26.90XA CHEMICAL BURN OF EYE: ICD-10-CM

## 2022-04-19 DIAGNOSIS — S05.02XA BILATERAL CORNEAL ABRASIONS, INITIAL ENCOUNTER: Primary | ICD-10-CM

## 2022-04-19 DIAGNOSIS — H57.13 EYE PAIN, BILATERAL: ICD-10-CM

## 2022-04-19 DIAGNOSIS — S05.01XA BILATERAL CORNEAL ABRASIONS, INITIAL ENCOUNTER: Primary | ICD-10-CM

## 2022-04-19 PROCEDURE — 1160F RVW MEDS BY RX/DR IN RCRD: CPT | Mod: CPTII,S$GLB,, | Performed by: OPTOMETRIST

## 2022-04-19 PROCEDURE — 3044F HG A1C LEVEL LT 7.0%: CPT | Mod: CPTII,S$GLB,, | Performed by: OPTOMETRIST

## 2022-04-19 PROCEDURE — 1125F PR PAIN SEVERITY QUANTIFIED, PAIN PRESENT: ICD-10-PCS | Mod: CPTII,S$GLB,, | Performed by: OPTOMETRIST

## 2022-04-19 PROCEDURE — 1159F MED LIST DOCD IN RCRD: CPT | Mod: CPTII,S$GLB,, | Performed by: OPTOMETRIST

## 2022-04-19 PROCEDURE — 1159F PR MEDICATION LIST DOCUMENTED IN MEDICAL RECORD: ICD-10-PCS | Mod: CPTII,S$GLB,, | Performed by: OPTOMETRIST

## 2022-04-19 PROCEDURE — 1160F PR REVIEW ALL MEDS BY PRESCRIBER/CLIN PHARMACIST DOCUMENTED: ICD-10-PCS | Mod: CPTII,S$GLB,, | Performed by: OPTOMETRIST

## 2022-04-19 PROCEDURE — 1125F AMNT PAIN NOTED PAIN PRSNT: CPT | Mod: CPTII,S$GLB,, | Performed by: OPTOMETRIST

## 2022-04-19 PROCEDURE — 99204 PR OFFICE/OUTPT VISIT, NEW, LEVL IV, 45-59 MIN: ICD-10-PCS | Mod: S$GLB,,, | Performed by: OPTOMETRIST

## 2022-04-19 PROCEDURE — 3044F PR MOST RECENT HEMOGLOBIN A1C LEVEL <7.0%: ICD-10-PCS | Mod: CPTII,S$GLB,, | Performed by: OPTOMETRIST

## 2022-04-19 PROCEDURE — 99999 PR PBB SHADOW E&M-EST. PATIENT-LVL III: ICD-10-PCS | Mod: PBBFAC,,, | Performed by: OPTOMETRIST

## 2022-04-19 PROCEDURE — 99204 OFFICE O/P NEW MOD 45 MIN: CPT | Mod: S$GLB,,, | Performed by: OPTOMETRIST

## 2022-04-19 PROCEDURE — 99999 PR PBB SHADOW E&M-EST. PATIENT-LVL III: CPT | Mod: PBBFAC,,, | Performed by: OPTOMETRIST

## 2022-04-19 RX ORDER — MOXIFLOXACIN 5 MG/ML
1 SOLUTION/ DROPS OPHTHALMIC 3 TIMES DAILY
Qty: 3 ML | Refills: 0 | Status: SHIPPED | OUTPATIENT
Start: 2022-04-19 | End: 2022-04-26

## 2022-04-19 RX ORDER — ERYTHROMYCIN 5 MG/G
OINTMENT OPHTHALMIC
Qty: 1 G | Refills: 0 | Status: SHIPPED | OUTPATIENT
Start: 2022-04-19 | End: 2022-09-13

## 2022-04-19 NOTE — TELEPHONE ENCOUNTER
Called to check on pt, his eyes feel much better after going to the opthamaligist. Appointment for pt to see Dr Perez in clinic tomorrow made with pt's approval.

## 2022-04-19 NOTE — PROGRESS NOTES
HPI     Pt here today for eye pain OU following surgery yesterday.   States   washed hands with antibacterial soap then rubbed OU and has had burning   since then.  Flushed eyes with water and saline gtts with no relief.     Difficulty with keeping OU open.    (+) pain 5/10 -- better then yesterday  (+) tearing  (+) light sensitivity  (+) redness    No visual complaints, just hurts to open OU.    Last edited by Freda Duggan on 4/19/2022  1:17 PM. (History)            Assessment /Plan     For exam results, see Encounter Report.    Bilateral corneal abrasions, initial encounter  -     moxifloxacin (VIGAMOX) 0.5 % ophthalmic solution; Place 1 drop into both eyes 3 (three) times daily. for 7 days  Dispense: 3 mL; Refill: 0  -     erythromycin (ROMYCIN) ophthalmic ointment; 1/4 inch ribbon in lower lids of both eyes at bedtime  Dispense: 1 g; Refill: 0    Chemical burn of eye    Eye pain, bilateral      Bilateral corneal abrasion superior  PH 7 OD, OS in office  Bandage cls (Air [employer]ix N&D 8.4/13.8 -0.25 DS) OD, OS  Start vigamox: 1 gt tid OU  Start erytrhomycin ester at bedtime, caution transient blurring of vision with ointment use  Start otc artificial tears qid OU    Return in the morning for f/u and bandage lens removal - pt prefers [facility] location, scheduled with Dr. Chin

## 2022-04-20 ENCOUNTER — OFFICE VISIT (OUTPATIENT)
Dept: OPTOMETRY | Facility: CLINIC | Age: 67
End: 2022-04-20
Payer: MEDICARE

## 2022-04-20 ENCOUNTER — OFFICE VISIT (OUTPATIENT)
Dept: HEMATOLOGY/ONCOLOGY | Facility: CLINIC | Age: 67
End: 2022-04-20
Payer: MEDICARE

## 2022-04-20 VITALS
TEMPERATURE: 98 F | OXYGEN SATURATION: 98 % | SYSTOLIC BLOOD PRESSURE: 186 MMHG | WEIGHT: 185.19 LBS | DIASTOLIC BLOOD PRESSURE: 81 MMHG | BODY MASS INDEX: 26.51 KG/M2 | HEART RATE: 52 BPM | HEIGHT: 70 IN | RESPIRATION RATE: 18 BRPM

## 2022-04-20 DIAGNOSIS — L98.8 MOHS DEFECT: Primary | ICD-10-CM

## 2022-04-20 DIAGNOSIS — Z98.890 MOHS DEFECT: Primary | ICD-10-CM

## 2022-04-20 DIAGNOSIS — T26.90XA CHEMICAL BURN OF EYE: Primary | ICD-10-CM

## 2022-04-20 PROCEDURE — 3079F PR MOST RECENT DIASTOLIC BLOOD PRESSURE 80-89 MM HG: ICD-10-PCS | Mod: CPTII,S$GLB,, | Performed by: OTOLARYNGOLOGY

## 2022-04-20 PROCEDURE — 3288F FALL RISK ASSESSMENT DOCD: CPT | Mod: CPTII,S$GLB,, | Performed by: OTOLARYNGOLOGY

## 2022-04-20 PROCEDURE — 99024 POSTOP FOLLOW-UP VISIT: CPT | Mod: S$GLB,,, | Performed by: OTOLARYNGOLOGY

## 2022-04-20 PROCEDURE — 1159F PR MEDICATION LIST DOCUMENTED IN MEDICAL RECORD: ICD-10-PCS | Mod: CPTII,S$GLB,, | Performed by: OPTOMETRIST

## 2022-04-20 PROCEDURE — 99999 PR PBB SHADOW E&M-EST. PATIENT-LVL V: CPT | Mod: PBBFAC,,, | Performed by: OTOLARYNGOLOGY

## 2022-04-20 PROCEDURE — 3008F PR BODY MASS INDEX (BMI) DOCUMENTED: ICD-10-PCS | Mod: CPTII,S$GLB,, | Performed by: OTOLARYNGOLOGY

## 2022-04-20 PROCEDURE — 3008F BODY MASS INDEX DOCD: CPT | Mod: CPTII,S$GLB,, | Performed by: OTOLARYNGOLOGY

## 2022-04-20 PROCEDURE — 99999 PR PBB SHADOW E&M-EST. PATIENT-LVL V: ICD-10-PCS | Mod: PBBFAC,,, | Performed by: OTOLARYNGOLOGY

## 2022-04-20 PROCEDURE — 1101F PT FALLS ASSESS-DOCD LE1/YR: CPT | Mod: CPTII,S$GLB,, | Performed by: OTOLARYNGOLOGY

## 2022-04-20 PROCEDURE — 1159F MED LIST DOCD IN RCRD: CPT | Mod: CPTII,S$GLB,, | Performed by: OPTOMETRIST

## 2022-04-20 PROCEDURE — 3044F HG A1C LEVEL LT 7.0%: CPT | Mod: CPTII,S$GLB,, | Performed by: OPTOMETRIST

## 2022-04-20 PROCEDURE — 1126F AMNT PAIN NOTED NONE PRSNT: CPT | Mod: CPTII,S$GLB,, | Performed by: OPTOMETRIST

## 2022-04-20 PROCEDURE — 99024 PR POST-OP FOLLOW-UP VISIT: ICD-10-PCS | Mod: S$GLB,,, | Performed by: OTOLARYNGOLOGY

## 2022-04-20 PROCEDURE — 99999 PR PBB SHADOW E&M-EST. PATIENT-LVL III: CPT | Mod: PBBFAC,,, | Performed by: OPTOMETRIST

## 2022-04-20 PROCEDURE — 3079F DIAST BP 80-89 MM HG: CPT | Mod: CPTII,S$GLB,, | Performed by: OTOLARYNGOLOGY

## 2022-04-20 PROCEDURE — 1159F MED LIST DOCD IN RCRD: CPT | Mod: CPTII,S$GLB,, | Performed by: OTOLARYNGOLOGY

## 2022-04-20 PROCEDURE — 3077F PR MOST RECENT SYSTOLIC BLOOD PRESSURE >= 140 MM HG: ICD-10-PCS | Mod: CPTII,S$GLB,, | Performed by: OTOLARYNGOLOGY

## 2022-04-20 PROCEDURE — 92012 PR EYE EXAM, EST PATIENT,INTERMED: ICD-10-PCS | Mod: S$GLB,,, | Performed by: OPTOMETRIST

## 2022-04-20 PROCEDURE — 3288F PR FALLS RISK ASSESSMENT DOCUMENTED: ICD-10-PCS | Mod: CPTII,S$GLB,, | Performed by: OTOLARYNGOLOGY

## 2022-04-20 PROCEDURE — 99999 PR PBB SHADOW E&M-EST. PATIENT-LVL III: ICD-10-PCS | Mod: PBBFAC,,, | Performed by: OPTOMETRIST

## 2022-04-20 PROCEDURE — 1101F PR PT FALLS ASSESS DOC 0-1 FALLS W/OUT INJ PAST YR: ICD-10-PCS | Mod: CPTII,S$GLB,, | Performed by: OTOLARYNGOLOGY

## 2022-04-20 PROCEDURE — 92012 INTRM OPH EXAM EST PATIENT: CPT | Mod: S$GLB,,, | Performed by: OPTOMETRIST

## 2022-04-20 PROCEDURE — 3044F PR MOST RECENT HEMOGLOBIN A1C LEVEL <7.0%: ICD-10-PCS | Mod: CPTII,S$GLB,, | Performed by: OTOLARYNGOLOGY

## 2022-04-20 PROCEDURE — 1125F AMNT PAIN NOTED PAIN PRSNT: CPT | Mod: CPTII,S$GLB,, | Performed by: OTOLARYNGOLOGY

## 2022-04-20 PROCEDURE — 1126F PR PAIN SEVERITY QUANTIFIED, NO PAIN PRESENT: ICD-10-PCS | Mod: CPTII,S$GLB,, | Performed by: OPTOMETRIST

## 2022-04-20 PROCEDURE — 3044F HG A1C LEVEL LT 7.0%: CPT | Mod: CPTII,S$GLB,, | Performed by: OTOLARYNGOLOGY

## 2022-04-20 PROCEDURE — 1159F PR MEDICATION LIST DOCUMENTED IN MEDICAL RECORD: ICD-10-PCS | Mod: CPTII,S$GLB,, | Performed by: OTOLARYNGOLOGY

## 2022-04-20 PROCEDURE — 1125F PR PAIN SEVERITY QUANTIFIED, PAIN PRESENT: ICD-10-PCS | Mod: CPTII,S$GLB,, | Performed by: OTOLARYNGOLOGY

## 2022-04-20 PROCEDURE — 3077F SYST BP >= 140 MM HG: CPT | Mod: CPTII,S$GLB,, | Performed by: OTOLARYNGOLOGY

## 2022-04-20 PROCEDURE — 3044F PR MOST RECENT HEMOGLOBIN A1C LEVEL <7.0%: ICD-10-PCS | Mod: CPTII,S$GLB,, | Performed by: OPTOMETRIST

## 2022-04-20 NOTE — PROGRESS NOTES
"HPI     Pt here for 1 day f/u for bilateral k abrasion w/ CL bandage removal.    Pt sts he does not have any pain today, pt is on pain meds. Pt sts he has   not started Vigamox or Erythromycin Dr. Shrestha gave him yesterday.       Last edited by Ally Gutierrez on 4/20/2022 10:10 AM. (History)        ROS     Positive for: Eyes    Negative for: Constitutional, Gastrointestinal, Neurological, Skin,   Genitourinary, Musculoskeletal, HENT, Endocrine, Cardiovascular,   Respiratory, Psychiatric, Allergic/Imm, Heme/Lymph    Last edited by CLINT Chin, OD on 4/20/2022 11:57 AM. (History)        Assessment /Plan     For exam results, see Encounter Report.    Chemical burn of eye      Presents w/ bcl in both eyes  Feels [facility] stable and "pain is gone", eyes feel normal     2 gtt proparacaine OU  Removed bcl's with some effort due to pt very squeamish    Cornea intact OU w/ no stain / no defect     Gave sample of blink ATs to use q2h while awake for 4-5 days  Does not need abx at this time    Message if any issues, f/u prn                    "

## 2022-04-20 NOTE — PROGRESS NOTES
Date of Encounter: 4/20/2021  Provider: Estelle Perez MD  Referring MD: Norm Melton MD  PCP: Escobar Hoffmann MD  Derm: Althea Archibald MD; Norm Melton MD  Cardiology:    CC:  BCCA nasal tip    HPI:      Patient is a 66 for reconstruction status post future Moh's surgery for basal cell carcinoma involving the left nasal tip.  Patient reports history of this 2 years ago at which time he was seen by Dr. Ochoa. He was treated with Odomzo 200 mg X 3 months which completely cleared the lesion.  Lesion on his left nasal tip started to recur about 8 months ago and he was ultimately seen by Dr. Archibald who performed a biopsy which was positive for basal cell CA.  Dr. Melton recommended surgical excision via Moh's micro surgery follow with reconstruction.    Patient denies any pain.  He informed me today that he does not wish to have surgery but would like to explore taking the hedgehog inhibitor again.    10/19/2021   Patient is here today for follow-up basal cell carcinoma involving the left nasal tip  No complaints  No new lesions  He is inquiring about reconstructive surgery  Patient continues to receive a hedgehog inhibitor--one pill a day X 6 months    1/20/2022  Patient is here today to discuss nasal reconstruction.  He is status post treatment with a hedgehog inhibitor with no evidence of gross disease.  He was seen by Dr. Melton to discuss Moh's micro surgery. Patient wishes to proceed with Moh's.  Patient has no new complaints.    2/15/2022  Here today status post Mohs procedure to discuss reconstruction tomorrow .    03/24/2022  Patient is here today for suture removal status post bilateral tenisha cartilage grafts, a mucosal graft and right paramedian forehead flap for a large complex nasal defect.  He has no new complaints    4/4/2022  Patient is here today for re-evaluation of forehead flap.  No complaints      4/20/2022  Patient is here today for evaluation revision forehead flap and root of helix  composite graft.  Postoperatively patient was treated for severe bilateral corneal abrasions yesterday by Ophthalmology.    ROS: see HPI  Constitutional: Negative for activity change and appetite change, weight loss.   Eyes: Negative for discharge, visual changes.   Respiratory: Negative for difficulty breathing and wheezing   Cardiovascular: Negative for chest pain.   Gastrointestinal: Negative for abdominal distention and abdominal pain.   Endocrine: Negative for cold intolerance and heat intolerance.   Genitourinary: Negative for dysuria.   Musculoskeletal: Negative for gait problem, muscle pain and joint swelling.   Skin: Negative for color change and pallor; negative for skin lesions.   Neurological: Negative for syncope and weakness; no numbness face.   Psychiatric/Behavioral: Negative for agitation and confusion; negative for depression.    Physical Exam:     Ear:   External Nose:  Suture line intact; flap viable; graft viable; less alar retraction--suture line very dry and crusted  Preauricular suture line-clean    Assessment:   S/P revision forehead flap and root of helix composite graft 2 days ago.  Corneal abrasions, bilateral    Plan:  Stage III in 3 weeks  Wound care discussed  Corneal abrasions per Ophthalmology

## 2022-04-20 NOTE — PATIENT INSTRUCTIONS
"DRY EYES -- BURNING OR REBEKA SYMPTOMS:  Use Over The Counter artificial tears as needed for dry eye symptoms.   Some common brands include:  Systane, Optive, Refresh, and Thera-Tears.  These drops can be used as frequently as desired, but may be most helpful use during long periods of concentrated work.  For example, reading / working at the computer. Start with 3-4x per day.     Nighttime Ophthalmic gel or ointments are available: Refresh PM, Genteal, and Lacrilube.    Avoid drops that "get redness out" (Visine, Murine, Clear Eyes), as these may contain medication that could further irritate the eyes, especially with chronic use.    ALLERGY EYES -- ITCHING SYMPTOMS:  Over the counter medications include--Pataday, Zaditor, and Alaway.  Use as directed 1-2 drops daily for symptoms of itching / watering eyes.  These drops will not help for dry eye or exposure symptoms.    REDNESS RELIEF:  Lumify---is a good redness reliever that will not cause irritation if used chronically.          "

## 2022-04-22 ENCOUNTER — CLINICAL SUPPORT (OUTPATIENT)
Dept: HEMATOLOGY/ONCOLOGY | Facility: CLINIC | Age: 67
End: 2022-04-22
Payer: MEDICARE

## 2022-04-22 VITALS
DIASTOLIC BLOOD PRESSURE: 95 MMHG | RESPIRATION RATE: 12 BRPM | OXYGEN SATURATION: 98 % | WEIGHT: 183 LBS | HEART RATE: 60 BPM | SYSTOLIC BLOOD PRESSURE: 213 MMHG | TEMPERATURE: 98 F | BODY MASS INDEX: 26.26 KG/M2

## 2022-04-22 PROCEDURE — 99999 PR PBB SHADOW E&M-EST. PATIENT-LVL III: CPT | Mod: PBBFAC,,,

## 2022-04-22 PROCEDURE — 99999 PR PBB SHADOW E&M-EST. PATIENT-LVL III: ICD-10-PCS | Mod: PBBFAC,,,

## 2022-04-22 NOTE — PROGRESS NOTES
Pt s/p Mohs revision with flap graft on 4/18/22 by Dr Perez.  Pt in today for graft check.  Incision to nose and flap graft dry and crusted with old bloody drainage and dry skin.  Sutures intact with edges well approximated, crusted and dried.  Pt stated that the relative who usually did his wound care was not assisting him.  Wound beds gently cleansed with saline and debris gently removed, pt tolerated well.  New graft site to left nostril was covered with debris and dry, after cleansing and debris removal graft site soft remains intact secured by stitches. Pt education on wound care provided and all questions answered to pt's satisfaction.  Pt taught to apply aquaphor to graft and incision sites, pt stated that he will be able to do his wound care.

## 2022-04-25 ENCOUNTER — CLINICAL SUPPORT (OUTPATIENT)
Dept: HEMATOLOGY/ONCOLOGY | Facility: CLINIC | Age: 67
End: 2022-04-25
Payer: MEDICARE

## 2022-04-25 VITALS
SYSTOLIC BLOOD PRESSURE: 198 MMHG | DIASTOLIC BLOOD PRESSURE: 92 MMHG | RESPIRATION RATE: 12 BRPM | OXYGEN SATURATION: 97 % | HEART RATE: 63 BPM | WEIGHT: 180.75 LBS | BODY MASS INDEX: 25.94 KG/M2 | TEMPERATURE: 98 F

## 2022-04-25 PROCEDURE — 99999 PR PBB SHADOW E&M-EST. PATIENT-LVL III: CPT | Mod: PBBFAC,,,

## 2022-04-25 PROCEDURE — 99999 PR PBB SHADOW E&M-EST. PATIENT-LVL III: ICD-10-PCS | Mod: PBBFAC,,,

## 2022-04-25 NOTE — PROGRESS NOTES
Pt s/p Mohs defect for BCC of nose with flap graft revision 3/16/22, in today for suture removal to ear and nose, sutures to area of new graft site in L nare to be left intact.  Flap graft area with some old drainage, area cleansed with saline.  Sutures to nose intact with edges approximated and wound moist with ointment.  Sutures to ear approximated dry and scabbed.  No s/s of infection.    Sutures to old graft flap removed without difficulty, site healing with edges remaining approximated. Pt tolerated suture removal well.  New graft to L nasal ala left with sutures intact, graft remains soft and edges well approximated.      Sutures to L ear removed with some difficulty due to scabbing over suture line.  Pt tolerated well.     Pt reports blowing nose and rubbing new graft site to L nare.  Reviewed wound care with pt, discussed being gentle with new graft site.  Had pt perform wound care to self in clinic today.  Pt stated that he can do wound care, but wants his relative to it for him.

## 2022-05-02 NOTE — PROGRESS NOTES
Date of Encounter: 4/20/2021  Provider: Estelle Perez MD  Referring MD: Norm Melton MD  PCP: Escobar Hoffmann MD  Derm: Althea Archibald MD; Norm Melton MD  Cardiology:    CC:  BCCA nasal tip    HPI:      Patient is a 66 for reconstruction status post future Moh's surgery for basal cell carcinoma involving the left nasal tip.  Patient reports history of this 2 years ago at which time he was seen by Dr. Ochoa. He was treated with Odomzo 200 mg X 3 months which completely cleared the lesion.  Lesion on his left nasal tip started to recur about 8 months ago and he was ultimately seen by Dr. Archibald who performed a biopsy which was positive for basal cell CA.  Dr. Melton recommended surgical excision via Moh's micro surgery follow with reconstruction.    Patient denies any pain.  He informed me today that he does not wish to have surgery but would like to explore taking the hedgehog inhibitor again.    10/19/2021   Patient is here today for follow-up basal cell carcinoma involving the left nasal tip  No complaints  No new lesions  He is inquiring about reconstructive surgery  Patient continues to receive a hedgehog inhibitor--one pill a day X 6 months    1/20/2022  Patient is here today to discuss nasal reconstruction.  He is status post treatment with a hedgehog inhibitor with no evidence of gross disease.  He was seen by Dr. Melton to discuss Moh's micro surgery. Patient wishes to proceed with Moh's.  Patient has no new complaints.    2/15/2022  Here today status post Mohs procedure to discuss reconstruction tomorrow .    03/24/2022  Patient is here today for suture removal status post bilateral tenisha cartilage grafts, a mucosal graft and right paramedian forehead flap for a large complex nasal defect.  He has no new complaints    4/4/2022  Patient is here today for re-evaluation of forehead flap.  No complaints    4/20/2022  Patient is here today for evaluation revision forehead flap and root of helix  composite graft.  Postoperatively patient was treated for severe bilateral corneal abrasions yesterday by Ophthalmology.    5/3/2022  Patient is here today for follow-up status post stage II of nasal reconstruction.  He has no new complaints.  He is here for evaluation of helical root graft.    ROS: see HPI  Constitutional: Negative for activity change and appetite change, weight loss.   Eyes: Negative for discharge, visual changes.   Respiratory: Negative for difficulty breathing and wheezing   Cardiovascular: Negative for chest pain.   Gastrointestinal: Negative for abdominal distention and abdominal pain.   Endocrine: Negative for cold intolerance and heat intolerance.   Genitourinary: Negative for dysuria.   Musculoskeletal: Negative for gait problem, muscle pain and joint swelling.   Skin: Negative for color change and pallor; negative for skin lesions.   Neurological: Negative for syncope and weakness; no numbness face.   Psychiatric/Behavioral: Negative for agitation and confusion; negative for depression.    Physical Exam:     Ear:   External Nose:  Forehead Flap is viable; minimal crusting of helical root graft but viable; nasal a large retraction improved  Forehead:  Donor site healed with minimal hypertrophic scarring  Preauricular suture line-healed    Assessment:   S/P revision forehead flap and root of helix composite graft ; alar retraction much improved.    Plan:  Stage III in 1 week

## 2022-05-03 ENCOUNTER — OFFICE VISIT (OUTPATIENT)
Dept: HEMATOLOGY/ONCOLOGY | Facility: CLINIC | Age: 67
End: 2022-05-03
Payer: MEDICARE

## 2022-05-03 DIAGNOSIS — L98.8 MOHS DEFECT: Primary | ICD-10-CM

## 2022-05-03 DIAGNOSIS — Z98.890 MOHS DEFECT: Primary | ICD-10-CM

## 2022-05-03 PROCEDURE — 1159F PR MEDICATION LIST DOCUMENTED IN MEDICAL RECORD: ICD-10-PCS | Mod: CPTII,S$GLB,, | Performed by: OTOLARYNGOLOGY

## 2022-05-03 PROCEDURE — 3044F HG A1C LEVEL LT 7.0%: CPT | Mod: CPTII,S$GLB,, | Performed by: OTOLARYNGOLOGY

## 2022-05-03 PROCEDURE — 1160F PR REVIEW ALL MEDS BY PRESCRIBER/CLIN PHARMACIST DOCUMENTED: ICD-10-PCS | Mod: CPTII,S$GLB,, | Performed by: OTOLARYNGOLOGY

## 2022-05-03 PROCEDURE — 1160F RVW MEDS BY RX/DR IN RCRD: CPT | Mod: CPTII,S$GLB,, | Performed by: OTOLARYNGOLOGY

## 2022-05-03 PROCEDURE — 1159F MED LIST DOCD IN RCRD: CPT | Mod: CPTII,S$GLB,, | Performed by: OTOLARYNGOLOGY

## 2022-05-03 PROCEDURE — 99024 PR POST-OP FOLLOW-UP VISIT: ICD-10-PCS | Mod: S$GLB,,, | Performed by: OTOLARYNGOLOGY

## 2022-05-03 PROCEDURE — 99999 PR PBB SHADOW E&M-EST. PATIENT-LVL I: CPT | Mod: PBBFAC,,, | Performed by: OTOLARYNGOLOGY

## 2022-05-03 PROCEDURE — 3044F PR MOST RECENT HEMOGLOBIN A1C LEVEL <7.0%: ICD-10-PCS | Mod: CPTII,S$GLB,, | Performed by: OTOLARYNGOLOGY

## 2022-05-03 PROCEDURE — 99024 POSTOP FOLLOW-UP VISIT: CPT | Mod: S$GLB,,, | Performed by: OTOLARYNGOLOGY

## 2022-05-03 PROCEDURE — 99999 PR PBB SHADOW E&M-EST. PATIENT-LVL I: ICD-10-PCS | Mod: PBBFAC,,, | Performed by: OTOLARYNGOLOGY

## 2022-05-17 ENCOUNTER — OFFICE VISIT (OUTPATIENT)
Dept: HEMATOLOGY/ONCOLOGY | Facility: CLINIC | Age: 67
End: 2022-05-17
Payer: MEDICARE

## 2022-05-17 VITALS
OXYGEN SATURATION: 97 % | TEMPERATURE: 97 F | SYSTOLIC BLOOD PRESSURE: 167 MMHG | HEART RATE: 76 BPM | WEIGHT: 184.31 LBS | RESPIRATION RATE: 18 BRPM | HEIGHT: 71 IN | DIASTOLIC BLOOD PRESSURE: 77 MMHG | BODY MASS INDEX: 25.8 KG/M2

## 2022-05-17 DIAGNOSIS — E11.49 TYPE 2 DIABETES MELLITUS WITH NEUROLOGICAL COMPLICATIONS: ICD-10-CM

## 2022-05-17 DIAGNOSIS — E78.5 HYPERLIPIDEMIA, UNSPECIFIED HYPERLIPIDEMIA TYPE: ICD-10-CM

## 2022-05-17 DIAGNOSIS — C44.311 BASAL CELL CARCINOMA OF NOSE: Primary | ICD-10-CM

## 2022-05-17 PROCEDURE — 99214 OFFICE O/P EST MOD 30 MIN: CPT | Mod: S$GLB,,, | Performed by: INTERNAL MEDICINE

## 2022-05-17 PROCEDURE — 3288F FALL RISK ASSESSMENT DOCD: CPT | Mod: CPTII,S$GLB,, | Performed by: INTERNAL MEDICINE

## 2022-05-17 PROCEDURE — 99999 PR PBB SHADOW E&M-EST. PATIENT-LVL III: CPT | Mod: PBBFAC,,, | Performed by: INTERNAL MEDICINE

## 2022-05-17 PROCEDURE — 3008F BODY MASS INDEX DOCD: CPT | Mod: CPTII,S$GLB,, | Performed by: INTERNAL MEDICINE

## 2022-05-17 PROCEDURE — 1101F PT FALLS ASSESS-DOCD LE1/YR: CPT | Mod: CPTII,S$GLB,, | Performed by: INTERNAL MEDICINE

## 2022-05-17 PROCEDURE — 3078F DIAST BP <80 MM HG: CPT | Mod: CPTII,S$GLB,, | Performed by: INTERNAL MEDICINE

## 2022-05-17 PROCEDURE — 1101F PR PT FALLS ASSESS DOC 0-1 FALLS W/OUT INJ PAST YR: ICD-10-PCS | Mod: CPTII,S$GLB,, | Performed by: INTERNAL MEDICINE

## 2022-05-17 PROCEDURE — 3077F SYST BP >= 140 MM HG: CPT | Mod: CPTII,S$GLB,, | Performed by: INTERNAL MEDICINE

## 2022-05-17 PROCEDURE — 3044F PR MOST RECENT HEMOGLOBIN A1C LEVEL <7.0%: ICD-10-PCS | Mod: CPTII,S$GLB,, | Performed by: INTERNAL MEDICINE

## 2022-05-17 PROCEDURE — 99999 PR PBB SHADOW E&M-EST. PATIENT-LVL III: ICD-10-PCS | Mod: PBBFAC,,, | Performed by: INTERNAL MEDICINE

## 2022-05-17 PROCEDURE — 1159F MED LIST DOCD IN RCRD: CPT | Mod: CPTII,S$GLB,, | Performed by: INTERNAL MEDICINE

## 2022-05-17 PROCEDURE — 3008F PR BODY MASS INDEX (BMI) DOCUMENTED: ICD-10-PCS | Mod: CPTII,S$GLB,, | Performed by: INTERNAL MEDICINE

## 2022-05-17 PROCEDURE — 3078F PR MOST RECENT DIASTOLIC BLOOD PRESSURE < 80 MM HG: ICD-10-PCS | Mod: CPTII,S$GLB,, | Performed by: INTERNAL MEDICINE

## 2022-05-17 PROCEDURE — 3044F HG A1C LEVEL LT 7.0%: CPT | Mod: CPTII,S$GLB,, | Performed by: INTERNAL MEDICINE

## 2022-05-17 PROCEDURE — 1159F PR MEDICATION LIST DOCUMENTED IN MEDICAL RECORD: ICD-10-PCS | Mod: CPTII,S$GLB,, | Performed by: INTERNAL MEDICINE

## 2022-05-17 PROCEDURE — 3288F PR FALLS RISK ASSESSMENT DOCUMENTED: ICD-10-PCS | Mod: CPTII,S$GLB,, | Performed by: INTERNAL MEDICINE

## 2022-05-17 PROCEDURE — 99214 PR OFFICE/OUTPT VISIT, EST, LEVL IV, 30-39 MIN: ICD-10-PCS | Mod: S$GLB,,, | Performed by: INTERNAL MEDICINE

## 2022-05-17 PROCEDURE — 3077F PR MOST RECENT SYSTOLIC BLOOD PRESSURE >= 140 MM HG: ICD-10-PCS | Mod: CPTII,S$GLB,, | Performed by: INTERNAL MEDICINE

## 2022-05-17 NOTE — PROGRESS NOTES
PATIENT: Duane G Raines  MRN: [id number]  DATE: 5/17/2022      Diagnosis:   1. Basal cell carcinoma of nose    2. Hyperlipidemia, unspecified hyperlipidemia type    3. 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 Sanchez is a 67 y.o. male with HTN, HLD, DMII, h/o CVA who presents for follow up of basal cell carcinoma.   Since the last clinic visit the patient underwent Moh's procedure under the care of Dr Melton on 3/15/22.  The patient then underwent reconstructive surgeries with Dr Perez on 3/16/22, 4/18/22 and 5/16/22 with septal mucosal flap, cartilage graft, right forehead flap graft with revisions.  Currently the patient states he feels well.  The patient denies CP, cough, SOB, abdominal pain, N/V, constipation, diarrhea.  The patient denies fever, chills, night sweats, weight loss, new lumps or bumps, easy bruising or bleeding.    Prior History: The patient was initially diagnosed with basal cell carcinoma 9/2018 on the left side of the nose.  He was started On Odomzo 5/2021 with plan to help shrink the tumor for potential future surgical resection.  The patient saw Dr. Perez on 10/19/21.  After further discussion with Dr. Perez decision was made to direct the patient to Dr. Norm Melton possible Mohs procedure.   The patient met with Dr Melton on 12/16/21 with plans for Moh's procedure and reconstruction with Dr Peerz.    Past Medical History:   Past Medical History:   Diagnosis Date    Cancer     skin cancer to nose    Diabetes mellitus, type 2     Hyperlipidemia     Hypertension     Stroke 2019       Past Surgical HIstory:   Past Surgical History:   Procedure Laterality Date    APPLICATION OF CARTILAGE GRAFT Bilateral 3/16/2022    Procedure: APPLICATION, CARTILAGE GRAFT;  Surgeon: Estelle Perez MD;  Location: [facility];  Service: ENT;  Laterality: Bilateral;    CLOSURE OF DEFECT OF MOHS  PROCEDURE Left 3/16/2022    Procedure: CLOSURE, MOHS PROCEDURE DEFECT - Face - Septal Mucosal Flap;  Surgeon: Estelle Perez MD;  Location: [facility];  Service: ENT;  Laterality: Left;    FLAP GRAFT SURGERY Right 3/16/2022    Procedure: FLAP GRAFT - Forehead;  Surgeon: Estelle Perez MD;  Location: [facility];  Service: ENT;  Laterality: Right;    FLAP GRAFT SURGERY N/A 4/18/2022    Procedure: FLAP GRAFT - COMPOSITE ROOT OF HELIX GRAFT;  Surgeon: Estelle Perez MD;  Location: [facility];  Service: ENT;  Laterality: N/A;    lipoma removal      to face    [place] TRANSCRAN DOPP INTRACRAN, EMBOLI W/O INJ  1/6/2018         REVISION OF FLAP GRAFT Left 4/18/2022    Procedure: REVISION, PROCEDURE - FOREHEAD FLAP;  Surgeon: Estelle Perez MD;  Location: [facility];  Service: ENT;  Laterality: Left;    REVISION OF FLAP GRAFT Left 5/16/2022    Procedure: REVISION, PROCEDURE INVOLVING FLAP GRAFT - Forehead to  Nose;  Surgeon: Estelle Perez MD;  Location: [facility];  Service: ENT;  Laterality: Left;       Family History:   Family History   Problem Relation Age of Onset    Diabetes Mother     Diabetes Maternal Grandmother     Glaucoma Neg Hx     Macular degeneration Neg Hx        Social History:  reports that he has quit smoking. He has never used smokeless tobacco. He reports that he does not drink alcohol and does not use drugs.    Allergies:  Review of patient's allergies indicates:  No Known Allergies    Medications:  Current Outpatient Medications   Medication Sig Dispense Refill    glipiZIDE (GLUCOTROL) 5 MG tablet Take 2 tablets (10 mg total) by mouth 2 (two) times daily with meals. 120 tablet 11    lancets (ACCU-CHEK MULTICLIX LANCET) Misc 1 lancet by Misc.(Non-Drug; Combo Route) route 2 (two) times daily. 100 each 0    atorvastatin (LIPITOR) 40 MG tablet Take 40 mg by mouth once daily.      erythromycin (ROMYCIN) ophthalmic ointment 1/4 inch ribbon in lower lids of both eyes at bedtime (Patient not taking: Reported on 5/17/2022) 1 g  "0    metFORMIN (GLUCOPHAGE) 500 MG tablet Take 500 mg by mouth 2 (two) times daily.       No current facility-administered medications for this visit.     Facility-Administered Medications Ordered in Other Visits   Medication Dose Route Frequency Provider Last Rate Last Admin    lactated ringers infusion   Intravenous Continuous Jacqueline Volpi-Abadie, MD   New Bag at 05/16/22 1130    LIDOcaine (PF) 10 mg/ml (1%) injection 10 mg  1 mL Other Once Jacqueline Volpi-Abadie, MD           Review of Systems   Constitutional: Negative for chills, diaphoresis, fatigue, fever and unexpected weight change.   Respiratory: Negative for cough and shortness of breath.    Cardiovascular: Negative for chest pain and palpitations.   Gastrointestinal: Negative for abdominal pain, constipation, diarrhea, nausea and vomiting.   Musculoskeletal: Negative for myalgias.   Skin: Negative for color change and rash.   Neurological: Negative for headaches.   Hematological: Negative for adenopathy. Does not bruise/bleed easily.       ECOG Performance Status: 0   Objective:      Vitals:   Vitals:    05/17/22 1426   BP: (!) 167/77   BP Location: Left arm   Patient Position: Sitting   BP Method: Medium (Automatic)   Pulse: 76   Resp: 18   Temp: 97.4 °F (36.3 °C)   TempSrc: Temporal   SpO2: 97%   Weight: 83.6 kg (184 lb 4.9 oz)   Height: 5' 11" (1.803 m)       Physical Exam  Constitutional:       General: He is not in acute distress.     Appearance: He is well-developed. He is not diaphoretic.   HENT:      Head: Normocephalic and atraumatic.      Comments: Vertical incision about forehead.  Sutures on left nasal turbinate with graft in place     Nose:      Comments: Pt with a pencil wide defect in the lateral lower left nare.  Cardiovascular:      Rate and Rhythm: Normal rate and regular rhythm.      Heart sounds: Normal heart sounds. No murmur heard.    No friction rub. No gallop.   Pulmonary:      Effort: Pulmonary effort is normal. No " respiratory distress.      Breath sounds: Normal breath sounds. No wheezing or rales.   Chest:      Chest wall: No tenderness.   Breasts:      Right: No axillary adenopathy or supraclavicular adenopathy.      Left: No axillary adenopathy or supraclavicular adenopathy.       Abdominal:      General: Bowel sounds are normal. There is no distension.      Palpations: Abdomen is soft. There is no mass.      Tenderness: There is no abdominal tenderness. There is no rebound.   Musculoskeletal:      Cervical back: Normal range of motion.   Lymphadenopathy:      Cervical: No cervical adenopathy.      Upper Body:      Right upper body: No supraclavicular or axillary adenopathy.      Left upper body: No supraclavicular or axillary adenopathy.   Skin:     General: Skin is warm and dry.      Findings: No erythema or rash.   Neurological:      Mental Status: He is alert and oriented to person, place, and time.   Psychiatric:         Behavior: Behavior normal.       Laboratory Data:  Admission on 05/16/2022, Discharged on 05/16/2022   Component Date Value Ref Range Status    WBC 05/16/2022 9.10  3.90 - 12.70 K/uL Final    RBC 05/16/2022 4.19 (A) 4.60 - 6.20 M/uL Final    Hemoglobin 05/16/2022 13.3 (A) 14.0 - 18.0 g/dL Final    Hematocrit 05/16/2022 38.8 (A) 40.0 - 54.0 % Final    MCV 05/16/2022 93  82 - 98 fL Final    MCH 05/16/2022 31.7 (A) 27.0 - 31.0 pg Final    MCHC 05/16/2022 34.3  32.0 - 36.0 g/dL Final    RDW 05/16/2022 11.8  11.5 - 14.5 % Final    Platelets 05/16/2022 195  150 - 450 K/uL Final    MPV 05/16/2022 10.6  9.2 - 12.9 fL Final    Sodium 05/16/2022 140  136 - 145 mmol/L Final    Potassium 05/16/2022 5.4 (A) 3.5 - 5.1 mmol/L Final    Specimen moderately hemolyzed    Chloride 05/16/2022 107  95 - 110 mmol/L Final    CO2 05/16/2022 26  22 - 31 mmol/L Final    Glucose 05/16/2022 176 (A) 70 - 110 mg/dL Final    Comment: The ADA recommends the following guidelines for fasting glucose:    Normal:        less than 100 mg/dL    Prediabetes:  100 mg/dL to 125 mg/dL    Diabetes:     126 mg/dL or higher      BUN 05/16/2022 15  9 - 21 mg/dL Final    Creatinine 05/16/2022 0.77  0.50 - 1.40 mg/dL Final    Calcium 05/16/2022 9.6  8.4 - 10.2 mg/dL Final    Anion Gap 05/16/2022 7 (A) 8 - 16 mmol/L Final    eGFR if African American 05/16/2022 >60  >60 mL/min/1.73 m^2 Final    eGFR if non African American 05/16/2022 >60  >60 mL/min/1.73 m^2 Final    Comment: Calculation used to obtain the estimated glomerular filtration  rate (eGFR) is the CKD-EPI equation.       POCT Glucose 05/16/2022 138 (A) 70 - 110 mg/dL Final    POCT Glucose 05/16/2022 189 (A) 70 - 110 mg/dL Final   Lab Visit on 05/13/2022   Component Date Value Ref Range Status    SARS-CoV2 (COVID-19) Qualitative P* 05/13/2022 Not Detected  Not Detected Final    Comment: This test utilizes real-time fluorescent reverse   transcription isothermal amplification to detect RNA   from the N and ORF-1ab genes of the SARS-CoV-2 virus.  The analytical sensitivity (limit of detection) of   this assay is 10 copies/uL.    A POSITIVE result is indicative of the presence of   SARS-CoV-2 RNA.  Clinical correlation with patient   history and other diagnostic information is necessary  to determine patient infection status.      A NOT DETECTED result does not preclude SARS-CoV-2   infection and should not be used as the sole basis for  patient management decisions.  If COVID-19 is strongly  suspected based on clinical and epidemiological history,  re-testing using an alternate molecular assay should  be considered.    This test is only for use under the Food and Drug  Administration s Emergency Use Authorization (EUA).    Commercial reagents are provided by [employer].  Performance characteristics of the EUA have been   independently verified by [facility]  Department of Pathology and Laboratory  Medicine.  _______________________________________________________           Imaging: Reviewed    Assessment:       1. Basal cell carcinoma of nose    2. Hyperlipidemia, unspecified hyperlipidemia type    3. 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 The patient needs a return appt with Dr Norm Melton with dermatology in 6 months.  The paitent needs an appt with me in 3 months with CBC and CMP prior to the appt.   Follow up with [facility]    Labs    Imaging    Pharmacy appointment    Other referrals               Kike Nguyen MD  [facility]  Hematology and Oncology  [facility]   [address] [place] [address]   O: [phone]  F: [phone]

## 2022-05-23 ENCOUNTER — OFFICE VISIT (OUTPATIENT)
Dept: HEMATOLOGY/ONCOLOGY | Facility: CLINIC | Age: 67
End: 2022-05-23
Payer: MEDICARE

## 2022-05-23 VITALS
HEIGHT: 71 IN | HEART RATE: 63 BPM | WEIGHT: 183.19 LBS | OXYGEN SATURATION: 99 % | SYSTOLIC BLOOD PRESSURE: 185 MMHG | TEMPERATURE: 98 F | RESPIRATION RATE: 18 BRPM | DIASTOLIC BLOOD PRESSURE: 80 MMHG | BODY MASS INDEX: 25.65 KG/M2

## 2022-05-23 DIAGNOSIS — Z98.890 MOHS DEFECT: Primary | ICD-10-CM

## 2022-05-23 DIAGNOSIS — L98.8 MOHS DEFECT: Primary | ICD-10-CM

## 2022-05-23 PROCEDURE — 1101F PR PT FALLS ASSESS DOC 0-1 FALLS W/OUT INJ PAST YR: ICD-10-PCS | Mod: CPTII,S$GLB,, | Performed by: OTOLARYNGOLOGY

## 2022-05-23 PROCEDURE — 3079F DIAST BP 80-89 MM HG: CPT | Mod: CPTII,S$GLB,, | Performed by: OTOLARYNGOLOGY

## 2022-05-23 PROCEDURE — 99024 POSTOP FOLLOW-UP VISIT: CPT | Mod: S$GLB,,, | Performed by: OTOLARYNGOLOGY

## 2022-05-23 PROCEDURE — 99024 PR POST-OP FOLLOW-UP VISIT: ICD-10-PCS | Mod: S$GLB,,, | Performed by: OTOLARYNGOLOGY

## 2022-05-23 PROCEDURE — 99999 PR PBB SHADOW E&M-EST. PATIENT-LVL IV: ICD-10-PCS | Mod: PBBFAC,,, | Performed by: OTOLARYNGOLOGY

## 2022-05-23 PROCEDURE — 3044F HG A1C LEVEL LT 7.0%: CPT | Mod: CPTII,S$GLB,, | Performed by: OTOLARYNGOLOGY

## 2022-05-23 PROCEDURE — 3008F PR BODY MASS INDEX (BMI) DOCUMENTED: ICD-10-PCS | Mod: CPTII,S$GLB,, | Performed by: OTOLARYNGOLOGY

## 2022-05-23 PROCEDURE — 1101F PT FALLS ASSESS-DOCD LE1/YR: CPT | Mod: CPTII,S$GLB,, | Performed by: OTOLARYNGOLOGY

## 2022-05-23 PROCEDURE — 3288F FALL RISK ASSESSMENT DOCD: CPT | Mod: CPTII,S$GLB,, | Performed by: OTOLARYNGOLOGY

## 2022-05-23 PROCEDURE — 1126F AMNT PAIN NOTED NONE PRSNT: CPT | Mod: CPTII,S$GLB,, | Performed by: OTOLARYNGOLOGY

## 2022-05-23 PROCEDURE — 99999 PR PBB SHADOW E&M-EST. PATIENT-LVL IV: CPT | Mod: PBBFAC,,, | Performed by: OTOLARYNGOLOGY

## 2022-05-23 PROCEDURE — 3008F BODY MASS INDEX DOCD: CPT | Mod: CPTII,S$GLB,, | Performed by: OTOLARYNGOLOGY

## 2022-05-23 PROCEDURE — 1159F PR MEDICATION LIST DOCUMENTED IN MEDICAL RECORD: ICD-10-PCS | Mod: CPTII,S$GLB,, | Performed by: OTOLARYNGOLOGY

## 2022-05-23 PROCEDURE — 3288F PR FALLS RISK ASSESSMENT DOCUMENTED: ICD-10-PCS | Mod: CPTII,S$GLB,, | Performed by: OTOLARYNGOLOGY

## 2022-05-23 PROCEDURE — 1126F PR PAIN SEVERITY QUANTIFIED, NO PAIN PRESENT: ICD-10-PCS | Mod: CPTII,S$GLB,, | Performed by: OTOLARYNGOLOGY

## 2022-05-23 PROCEDURE — 1159F MED LIST DOCD IN RCRD: CPT | Mod: CPTII,S$GLB,, | Performed by: OTOLARYNGOLOGY

## 2022-05-23 PROCEDURE — 3044F PR MOST RECENT HEMOGLOBIN A1C LEVEL <7.0%: ICD-10-PCS | Mod: CPTII,S$GLB,, | Performed by: OTOLARYNGOLOGY

## 2022-05-23 PROCEDURE — 3079F PR MOST RECENT DIASTOLIC BLOOD PRESSURE 80-89 MM HG: ICD-10-PCS | Mod: CPTII,S$GLB,, | Performed by: OTOLARYNGOLOGY

## 2022-05-23 PROCEDURE — 3077F PR MOST RECENT SYSTOLIC BLOOD PRESSURE >= 140 MM HG: ICD-10-PCS | Mod: CPTII,S$GLB,, | Performed by: OTOLARYNGOLOGY

## 2022-05-23 PROCEDURE — 3077F SYST BP >= 140 MM HG: CPT | Mod: CPTII,S$GLB,, | Performed by: OTOLARYNGOLOGY

## 2022-05-23 NOTE — PROGRESS NOTES
Date of Encounter: 4/20/2021  Provider: Estelle Perez MD  Referring MD: Norm Melton MD  PCP: Escobar Hoffmann MD  Derm: Althea Archibald MD; Norm Melton MD  Cardiology:    CC:  BCCA nasal tip    HPI:      Patient is a 66 for reconstruction status post future Moh's surgery for basal cell carcinoma involving the left nasal tip.  Patient reports history of this 2 years ago at which time he was seen by Dr. Ochoa. He was treated with Odomzo 200 mg X 3 months which completely cleared the lesion.  Lesion on his left nasal tip started to recur about 8 months ago and he was ultimately seen by Dr. Archibald who performed a biopsy which was positive for basal cell CA.  Dr. Melton recommended surgical excision via Moh's micro surgery follow with reconstruction.    Patient denies any pain.  He informed me today that he does not wish to have surgery but would like to explore taking the hedgehog inhibitor again.    10/19/2021   Patient is here today for follow-up basal cell carcinoma involving the left nasal tip  No complaints  No new lesions  He is inquiring about reconstructive surgery  Patient continues to receive a hedgehog inhibitor--one pill a day X 6 months    1/20/2022  Patient is here today to discuss nasal reconstruction.  He is status post treatment with a hedgehog inhibitor with no evidence of gross disease.  He was seen by Dr. Melton to discuss Moh's micro surgery. Patient wishes to proceed with Moh's.  Patient has no new complaints.    2/15/2022  Here today status post Mohs procedure to discuss reconstruction tomorrow .    03/24/2022  Patient is here today for suture removal status post bilateral tenisha cartilage grafts, a mucosal graft and right paramedian forehead flap for a large complex nasal defect.  He has no new complaints    4/4/2022  Patient is here today for re-evaluation of forehead flap.  No complaints      4/20/2022  Patient is here today for evaluation revision forehead flap and root of helix  composite graft.  Postoperatively patient was treated for severe bilateral corneal abrasions yesterday by Ophthalmology.    5/23/2022  Here today for F/U flap revision--transection of pedicle and insetting of flap  No complaints    ROS: see HPI  Constitutional: Negative for activity change and appetite change, weight loss.   Eyes: Negative for discharge, visual changes.   Respiratory: Negative for difficulty breathing and wheezing   Cardiovascular: Negative for chest pain.   Gastrointestinal: Negative for abdominal distention and abdominal pain.   Endocrine: Negative for cold intolerance and heat intolerance.   Genitourinary: Negative for dysuria.   Musculoskeletal: Negative for gait problem, muscle pain and joint swelling.   Skin: Negative for color change and pallor; negative for skin lesions.   Neurological: Negative for syncope and weakness; no numbness face.   Psychiatric/Behavioral: Negative for agitation and confusion; negative for depression.    Physical Exam:     Ear:   External Nose:  Suture line intact; flap viable; tissue thick; suture lines intact--photos in EPIC  Preauricular suture line-clean    Assessment:   S/P revision forehead flap -transection of pedicle and insetting    Plan:  Sutures removed an Aquaphor placed  Wound care instructions given and patient informed that he must wear sunblock  Follow-up 1 month

## 2022-06-16 ENCOUNTER — TELEPHONE (OUTPATIENT)
Dept: HEMATOLOGY/ONCOLOGY | Facility: CLINIC | Age: 67
End: 2022-06-16
Payer: MEDICARE

## 2022-06-21 ENCOUNTER — OFFICE VISIT (OUTPATIENT)
Dept: HEMATOLOGY/ONCOLOGY | Facility: CLINIC | Age: 67
End: 2022-06-21
Payer: MEDICARE

## 2022-06-21 VITALS
OXYGEN SATURATION: 98 % | HEART RATE: 58 BPM | SYSTOLIC BLOOD PRESSURE: 160 MMHG | HEIGHT: 71 IN | DIASTOLIC BLOOD PRESSURE: 78 MMHG | RESPIRATION RATE: 18 BRPM | TEMPERATURE: 98 F | BODY MASS INDEX: 26.94 KG/M2 | WEIGHT: 192.44 LBS

## 2022-06-21 DIAGNOSIS — L98.8 MOHS DEFECT: Primary | ICD-10-CM

## 2022-06-21 DIAGNOSIS — Z98.890 MOHS DEFECT: Primary | ICD-10-CM

## 2022-06-21 PROCEDURE — 3044F HG A1C LEVEL LT 7.0%: CPT | Mod: CPTII,S$GLB,, | Performed by: OTOLARYNGOLOGY

## 2022-06-21 PROCEDURE — 1160F PR REVIEW ALL MEDS BY PRESCRIBER/CLIN PHARMACIST DOCUMENTED: ICD-10-PCS | Mod: CPTII,S$GLB,, | Performed by: OTOLARYNGOLOGY

## 2022-06-21 PROCEDURE — 3078F PR MOST RECENT DIASTOLIC BLOOD PRESSURE < 80 MM HG: ICD-10-PCS | Mod: CPTII,S$GLB,, | Performed by: OTOLARYNGOLOGY

## 2022-06-21 PROCEDURE — 1101F PR PT FALLS ASSESS DOC 0-1 FALLS W/OUT INJ PAST YR: ICD-10-PCS | Mod: CPTII,S$GLB,, | Performed by: OTOLARYNGOLOGY

## 2022-06-21 PROCEDURE — 3077F PR MOST RECENT SYSTOLIC BLOOD PRESSURE >= 140 MM HG: ICD-10-PCS | Mod: CPTII,S$GLB,, | Performed by: OTOLARYNGOLOGY

## 2022-06-21 PROCEDURE — 3077F SYST BP >= 140 MM HG: CPT | Mod: CPTII,S$GLB,, | Performed by: OTOLARYNGOLOGY

## 2022-06-21 PROCEDURE — 3008F BODY MASS INDEX DOCD: CPT | Mod: CPTII,S$GLB,, | Performed by: OTOLARYNGOLOGY

## 2022-06-21 PROCEDURE — 3008F PR BODY MASS INDEX (BMI) DOCUMENTED: ICD-10-PCS | Mod: CPTII,S$GLB,, | Performed by: OTOLARYNGOLOGY

## 2022-06-21 PROCEDURE — 99024 PR POST-OP FOLLOW-UP VISIT: ICD-10-PCS | Mod: S$GLB,,, | Performed by: OTOLARYNGOLOGY

## 2022-06-21 PROCEDURE — 99999 PR PBB SHADOW E&M-EST. PATIENT-LVL IV: CPT | Mod: PBBFAC,,, | Performed by: OTOLARYNGOLOGY

## 2022-06-21 PROCEDURE — 1159F MED LIST DOCD IN RCRD: CPT | Mod: CPTII,S$GLB,, | Performed by: OTOLARYNGOLOGY

## 2022-06-21 PROCEDURE — 1159F PR MEDICATION LIST DOCUMENTED IN MEDICAL RECORD: ICD-10-PCS | Mod: CPTII,S$GLB,, | Performed by: OTOLARYNGOLOGY

## 2022-06-21 PROCEDURE — 99999 PR PBB SHADOW E&M-EST. PATIENT-LVL IV: ICD-10-PCS | Mod: PBBFAC,,, | Performed by: OTOLARYNGOLOGY

## 2022-06-21 PROCEDURE — 99024 POSTOP FOLLOW-UP VISIT: CPT | Mod: S$GLB,,, | Performed by: OTOLARYNGOLOGY

## 2022-06-21 PROCEDURE — 3288F FALL RISK ASSESSMENT DOCD: CPT | Mod: CPTII,S$GLB,, | Performed by: OTOLARYNGOLOGY

## 2022-06-21 PROCEDURE — 1101F PT FALLS ASSESS-DOCD LE1/YR: CPT | Mod: CPTII,S$GLB,, | Performed by: OTOLARYNGOLOGY

## 2022-06-21 PROCEDURE — 1160F RVW MEDS BY RX/DR IN RCRD: CPT | Mod: CPTII,S$GLB,, | Performed by: OTOLARYNGOLOGY

## 2022-06-21 PROCEDURE — 3288F PR FALLS RISK ASSESSMENT DOCUMENTED: ICD-10-PCS | Mod: CPTII,S$GLB,, | Performed by: OTOLARYNGOLOGY

## 2022-06-21 PROCEDURE — 1126F AMNT PAIN NOTED NONE PRSNT: CPT | Mod: CPTII,S$GLB,, | Performed by: OTOLARYNGOLOGY

## 2022-06-21 PROCEDURE — 3044F PR MOST RECENT HEMOGLOBIN A1C LEVEL <7.0%: ICD-10-PCS | Mod: CPTII,S$GLB,, | Performed by: OTOLARYNGOLOGY

## 2022-06-21 PROCEDURE — 1126F PR PAIN SEVERITY QUANTIFIED, NO PAIN PRESENT: ICD-10-PCS | Mod: CPTII,S$GLB,, | Performed by: OTOLARYNGOLOGY

## 2022-06-21 PROCEDURE — 3078F DIAST BP <80 MM HG: CPT | Mod: CPTII,S$GLB,, | Performed by: OTOLARYNGOLOGY

## 2022-06-21 NOTE — PROGRESS NOTES
Date of Encounter: 4/20/2021  Provider: Estelle Perez MD  Referring MD: Norm Melton MD  PCP: Escobar Hoffmann MD  Derm: Althea Archibald MD; Norm Melton MD  Cardiology:    CC:  BCCA nasal tip    HPI:      Patient is a 66 for reconstruction status post future Moh's surgery for basal cell carcinoma involving the left nasal tip.  Patient reports history of this 2 years ago at which time he was seen by Dr. Ochoa. He was treated with Odomzo 200 mg X 3 months which completely cleared the lesion.  Lesion on his left nasal tip started to recur about 8 months ago and he was ultimately seen by Dr. Archibald who performed a biopsy which was positive for basal cell CA.  Dr. Melton recommended surgical excision via Moh's micro surgery follow with reconstruction.    Patient denies any pain.  He informed me today that he does not wish to have surgery but would like to explore taking the hedgehog inhibitor again.    10/19/2021   Patient is here today for follow-up basal cell carcinoma involving the left nasal tip  No complaints  No new lesions  He is inquiring about reconstructive surgery  Patient continues to receive a hedgehog inhibitor--one pill a day X 6 months    1/20/2022  Patient is here today to discuss nasal reconstruction.  He is status post treatment with a hedgehog inhibitor with no evidence of gross disease.  He was seen by Dr. Melton to discuss Moh's micro surgery. Patient wishes to proceed with Moh's.  Patient has no new complaints.    2/15/2022  Here today status post Mohs procedure to discuss reconstruction tomorrow .    03/24/2022  Patient is here today for suture removal status post bilateral tenisha cartilage grafts, a mucosal graft and right paramedian forehead flap for a large complex nasal defect.  He has no new complaints    4/4/2022  Patient is here today for re-evaluation of forehead flap.  No complaints      4/20/2022  Patient is here today for evaluation revision forehead flap and root of helix  composite graft.  Postoperatively patient was treated for severe bilateral corneal abrasions yesterday by Ophthalmology.    5/23/2022  Here today for F/U flap revision--transection of pedicle and insetting of flap  No complaints    6/21/2022  He is here one month S/P flap revision  He is happy with reconstruction    ROS: see HPI  Constitutional: Negative for activity change and appetite change, weight loss.   Eyes: Negative for discharge, visual changes.   Respiratory: Negative for difficulty breathing and wheezing   Cardiovascular: Negative for chest pain.   Gastrointestinal: Negative for abdominal distention and abdominal pain.   Endocrine: Negative for cold intolerance and heat intolerance.   Genitourinary: Negative for dysuria.   Musculoskeletal: Negative for gait problem, muscle pain and joint swelling.   Skin: Negative for color change and pallor; negative for skin lesions.   Neurological: Negative for syncope and weakness; no numbness face.   Psychiatric/Behavioral: Negative for agitation and confusion; negative for depression.    Physical Exam:     Ear:   External Nose:  pin cushioning of forehead flap with obvious suture line--photos in EPIC      Assessment:   S/P revision forehead flap; cosmetic result overall good    Plan:  Start scar massage  Discussed dermabraision to help with noticeable suture lines but does not wish to have additional revisions done. He is happy with result.

## 2022-07-07 ENCOUNTER — OFFICE VISIT (OUTPATIENT)
Dept: DERMATOLOGY | Facility: CLINIC | Age: 67
End: 2022-07-07
Payer: MEDICARE

## 2022-07-07 DIAGNOSIS — Z98.890 HISTORY OF MOHS MICROGRAPHIC SURGERY FOR SKIN CANCER: Primary | ICD-10-CM

## 2022-07-07 DIAGNOSIS — Z85.828 HISTORY OF MOHS MICROGRAPHIC SURGERY FOR SKIN CANCER: Primary | ICD-10-CM

## 2022-07-07 PROCEDURE — 99024 POSTOP FOLLOW-UP VISIT: CPT | Mod: S$GLB,,, | Performed by: DERMATOLOGY

## 2022-07-07 PROCEDURE — 99024 PR POST-OP FOLLOW-UP VISIT: ICD-10-PCS | Mod: S$GLB,,, | Performed by: DERMATOLOGY

## 2022-07-07 PROCEDURE — 99999 PR PBB SHADOW E&M-EST. PATIENT-LVL II: CPT | Mod: PBBFAC,,, | Performed by: DERMATOLOGY

## 2022-07-07 PROCEDURE — 1160F RVW MEDS BY RX/DR IN RCRD: CPT | Mod: CPTII,S$GLB,, | Performed by: DERMATOLOGY

## 2022-07-07 PROCEDURE — 99999 PR PBB SHADOW E&M-EST. PATIENT-LVL II: ICD-10-PCS | Mod: PBBFAC,,, | Performed by: DERMATOLOGY

## 2022-07-07 PROCEDURE — 3288F FALL RISK ASSESSMENT DOCD: CPT | Mod: CPTII,S$GLB,, | Performed by: DERMATOLOGY

## 2022-07-07 PROCEDURE — 1160F PR REVIEW ALL MEDS BY PRESCRIBER/CLIN PHARMACIST DOCUMENTED: ICD-10-PCS | Mod: CPTII,S$GLB,, | Performed by: DERMATOLOGY

## 2022-07-07 PROCEDURE — 3288F PR FALLS RISK ASSESSMENT DOCUMENTED: ICD-10-PCS | Mod: CPTII,S$GLB,, | Performed by: DERMATOLOGY

## 2022-07-07 PROCEDURE — 1101F PR PT FALLS ASSESS DOC 0-1 FALLS W/OUT INJ PAST YR: ICD-10-PCS | Mod: CPTII,S$GLB,, | Performed by: DERMATOLOGY

## 2022-07-07 PROCEDURE — 1126F AMNT PAIN NOTED NONE PRSNT: CPT | Mod: CPTII,S$GLB,, | Performed by: DERMATOLOGY

## 2022-07-07 PROCEDURE — 3044F HG A1C LEVEL LT 7.0%: CPT | Mod: CPTII,S$GLB,, | Performed by: DERMATOLOGY

## 2022-07-07 PROCEDURE — 1159F MED LIST DOCD IN RCRD: CPT | Mod: CPTII,S$GLB,, | Performed by: DERMATOLOGY

## 2022-07-07 PROCEDURE — 3044F PR MOST RECENT HEMOGLOBIN A1C LEVEL <7.0%: ICD-10-PCS | Mod: CPTII,S$GLB,, | Performed by: DERMATOLOGY

## 2022-07-07 PROCEDURE — 1126F PR PAIN SEVERITY QUANTIFIED, NO PAIN PRESENT: ICD-10-PCS | Mod: CPTII,S$GLB,, | Performed by: DERMATOLOGY

## 2022-07-07 PROCEDURE — 1101F PT FALLS ASSESS-DOCD LE1/YR: CPT | Mod: CPTII,S$GLB,, | Performed by: DERMATOLOGY

## 2022-07-07 PROCEDURE — 1159F PR MEDICATION LIST DOCUMENTED IN MEDICAL RECORD: ICD-10-PCS | Mod: CPTII,S$GLB,, | Performed by: DERMATOLOGY

## 2022-07-07 NOTE — PROGRESS NOTES
CC: 67 y.o.male patient is here for followup     HPI: Patient is 4 months s/p Mohs' micrographic surgery, fresh tissue technique, of a large basal cell carcinoma on the nose; with subsequent repair by Dr. Perez via forehead flap  Patient reports no problems    EXAM: Site appears well healed. Overall good cosmetic and functional result  He is pleased with the current status    IMPRESSION: Well healed post Mohs' micrographic surgery    PLAN:  Discussed anticipated course  Followup to general dermatology in 2-3 months; prn to me

## 2022-08-17 ENCOUNTER — TELEPHONE (OUTPATIENT)
Dept: DERMATOLOGY | Facility: CLINIC | Age: 67
End: 2022-08-17
Payer: MEDICARE

## 2022-08-17 ENCOUNTER — LAB VISIT (OUTPATIENT)
Dept: LAB | Facility: HOSPITAL | Age: 67
End: 2022-08-17
Attending: INTERNAL MEDICINE
Payer: MEDICARE

## 2022-08-17 ENCOUNTER — OFFICE VISIT (OUTPATIENT)
Dept: HEMATOLOGY/ONCOLOGY | Facility: CLINIC | Age: 67
End: 2022-08-17
Payer: MEDICARE

## 2022-08-17 VITALS
WEIGHT: 196.19 LBS | HEART RATE: 60 BPM | BODY MASS INDEX: 27.47 KG/M2 | OXYGEN SATURATION: 97 % | RESPIRATION RATE: 18 BRPM | TEMPERATURE: 98 F | HEIGHT: 71 IN | SYSTOLIC BLOOD PRESSURE: 169 MMHG | DIASTOLIC BLOOD PRESSURE: 76 MMHG

## 2022-08-17 DIAGNOSIS — C44.311 BASAL CELL CARCINOMA OF NOSE: ICD-10-CM

## 2022-08-17 DIAGNOSIS — E78.5 HYPERLIPIDEMIA, UNSPECIFIED HYPERLIPIDEMIA TYPE: ICD-10-CM

## 2022-08-17 DIAGNOSIS — D64.9 NORMOCYTIC ANEMIA: ICD-10-CM

## 2022-08-17 DIAGNOSIS — E11.49 TYPE 2 DIABETES MELLITUS WITH NEUROLOGICAL COMPLICATIONS: ICD-10-CM

## 2022-08-17 DIAGNOSIS — C44.311 BASAL CELL CARCINOMA OF NOSE: Primary | ICD-10-CM

## 2022-08-17 LAB
ALBUMIN SERPL BCP-MCNC: 3.9 G/DL (ref 3.5–5.2)
ALP SERPL-CCNC: 77 U/L (ref 55–135)
ALT SERPL W/O P-5'-P-CCNC: 27 U/L (ref 10–44)
ANION GAP SERPL CALC-SCNC: 12 MMOL/L (ref 8–16)
AST SERPL-CCNC: 18 U/L (ref 10–40)
BASOPHILS # BLD AUTO: 0.03 K/UL (ref 0–0.2)
BASOPHILS NFR BLD: 0.4 % (ref 0–1.9)
BILIRUB SERPL-MCNC: 0.3 MG/DL (ref 0.1–1)
BUN SERPL-MCNC: 13 MG/DL (ref 8–23)
CALCIUM SERPL-MCNC: 10 MG/DL (ref 8.7–10.5)
CHLORIDE SERPL-SCNC: 106 MMOL/L (ref 95–110)
CO2 SERPL-SCNC: 23 MMOL/L (ref 23–29)
CREAT SERPL-MCNC: 1.3 MG/DL (ref 0.5–1.4)
DIFFERENTIAL METHOD: ABNORMAL
EOSINOPHIL # BLD AUTO: 0.1 K/UL (ref 0–0.5)
EOSINOPHIL NFR BLD: 1.5 % (ref 0–8)
ERYTHROCYTE [DISTWIDTH] IN BLOOD BY AUTOMATED COUNT: 12 % (ref 11.5–14.5)
EST. GFR  (NO RACE VARIABLE): >60 ML/MIN/1.73 M^2
GLUCOSE SERPL-MCNC: 290 MG/DL (ref 70–110)
HCT VFR BLD AUTO: 38.3 % (ref 40–54)
HGB BLD-MCNC: 13.5 G/DL (ref 14–18)
IMM GRANULOCYTES # BLD AUTO: 0.02 K/UL (ref 0–0.04)
IMM GRANULOCYTES NFR BLD AUTO: 0.3 % (ref 0–0.5)
LYMPHOCYTES # BLD AUTO: 2.5 K/UL (ref 1–4.8)
LYMPHOCYTES NFR BLD: 36.1 % (ref 18–48)
MCH RBC QN AUTO: 33.1 PG (ref 27–31)
MCHC RBC AUTO-ENTMCNC: 35.2 G/DL (ref 32–36)
MCV RBC AUTO: 94 FL (ref 82–98)
MONOCYTES # BLD AUTO: 0.4 K/UL (ref 0.3–1)
MONOCYTES NFR BLD: 5.5 % (ref 4–15)
NEUTROPHILS # BLD AUTO: 3.9 K/UL (ref 1.8–7.7)
NEUTROPHILS NFR BLD: 56.2 % (ref 38–73)
NRBC BLD-RTO: 0 /100 WBC
PLATELET # BLD AUTO: 187 K/UL (ref 150–450)
PMV BLD AUTO: 9.8 FL (ref 9.2–12.9)
POTASSIUM SERPL-SCNC: 4 MMOL/L (ref 3.5–5.1)
PROT SERPL-MCNC: 7 G/DL (ref 6–8.4)
RBC # BLD AUTO: 4.08 M/UL (ref 4.6–6.2)
SODIUM SERPL-SCNC: 141 MMOL/L (ref 136–145)
WBC # BLD AUTO: 6.85 K/UL (ref 3.9–12.7)

## 2022-08-17 PROCEDURE — 1126F PR PAIN SEVERITY QUANTIFIED, NO PAIN PRESENT: ICD-10-PCS | Mod: CPTII,S$GLB,, | Performed by: INTERNAL MEDICINE

## 2022-08-17 PROCEDURE — 99999 PR PBB SHADOW E&M-EST. PATIENT-LVL III: CPT | Mod: PBBFAC,,, | Performed by: INTERNAL MEDICINE

## 2022-08-17 PROCEDURE — 3078F PR MOST RECENT DIASTOLIC BLOOD PRESSURE < 80 MM HG: ICD-10-PCS | Mod: CPTII,S$GLB,, | Performed by: INTERNAL MEDICINE

## 2022-08-17 PROCEDURE — 80053 COMPREHEN METABOLIC PANEL: CPT | Mod: PN | Performed by: INTERNAL MEDICINE

## 2022-08-17 PROCEDURE — 3044F HG A1C LEVEL LT 7.0%: CPT | Mod: CPTII,S$GLB,, | Performed by: INTERNAL MEDICINE

## 2022-08-17 PROCEDURE — 1159F MED LIST DOCD IN RCRD: CPT | Mod: CPTII,S$GLB,, | Performed by: INTERNAL MEDICINE

## 2022-08-17 PROCEDURE — 36415 COLL VENOUS BLD VENIPUNCTURE: CPT | Mod: PN | Performed by: INTERNAL MEDICINE

## 2022-08-17 PROCEDURE — 3044F PR MOST RECENT HEMOGLOBIN A1C LEVEL <7.0%: ICD-10-PCS | Mod: CPTII,S$GLB,, | Performed by: INTERNAL MEDICINE

## 2022-08-17 PROCEDURE — 1126F AMNT PAIN NOTED NONE PRSNT: CPT | Mod: CPTII,S$GLB,, | Performed by: INTERNAL MEDICINE

## 2022-08-17 PROCEDURE — 3077F PR MOST RECENT SYSTOLIC BLOOD PRESSURE >= 140 MM HG: ICD-10-PCS | Mod: CPTII,S$GLB,, | Performed by: INTERNAL MEDICINE

## 2022-08-17 PROCEDURE — 3288F PR FALLS RISK ASSESSMENT DOCUMENTED: ICD-10-PCS | Mod: CPTII,S$GLB,, | Performed by: INTERNAL MEDICINE

## 2022-08-17 PROCEDURE — 3008F BODY MASS INDEX DOCD: CPT | Mod: CPTII,S$GLB,, | Performed by: INTERNAL MEDICINE

## 2022-08-17 PROCEDURE — 1101F PT FALLS ASSESS-DOCD LE1/YR: CPT | Mod: CPTII,S$GLB,, | Performed by: INTERNAL MEDICINE

## 2022-08-17 PROCEDURE — 85025 COMPLETE CBC W/AUTO DIFF WBC: CPT | Mod: PN | Performed by: INTERNAL MEDICINE

## 2022-08-17 PROCEDURE — 3077F SYST BP >= 140 MM HG: CPT | Mod: CPTII,S$GLB,, | Performed by: INTERNAL MEDICINE

## 2022-08-17 PROCEDURE — 99214 OFFICE O/P EST MOD 30 MIN: CPT | Mod: S$GLB,,, | Performed by: INTERNAL MEDICINE

## 2022-08-17 PROCEDURE — 99214 PR OFFICE/OUTPT VISIT, EST, LEVL IV, 30-39 MIN: ICD-10-PCS | Mod: S$GLB,,, | Performed by: INTERNAL MEDICINE

## 2022-08-17 PROCEDURE — 99999 PR PBB SHADOW E&M-EST. PATIENT-LVL III: ICD-10-PCS | Mod: PBBFAC,,, | Performed by: INTERNAL MEDICINE

## 2022-08-17 PROCEDURE — 3008F PR BODY MASS INDEX (BMI) DOCUMENTED: ICD-10-PCS | Mod: CPTII,S$GLB,, | Performed by: INTERNAL MEDICINE

## 2022-08-17 PROCEDURE — 1159F PR MEDICATION LIST DOCUMENTED IN MEDICAL RECORD: ICD-10-PCS | Mod: CPTII,S$GLB,, | Performed by: INTERNAL MEDICINE

## 2022-08-17 PROCEDURE — 3078F DIAST BP <80 MM HG: CPT | Mod: CPTII,S$GLB,, | Performed by: INTERNAL MEDICINE

## 2022-08-17 PROCEDURE — 3288F FALL RISK ASSESSMENT DOCD: CPT | Mod: CPTII,S$GLB,, | Performed by: INTERNAL MEDICINE

## 2022-08-17 PROCEDURE — 1101F PR PT FALLS ASSESS DOC 0-1 FALLS W/OUT INJ PAST YR: ICD-10-PCS | Mod: CPTII,S$GLB,, | Performed by: INTERNAL MEDICINE

## 2022-08-17 NOTE — PROGRESS NOTES
PATIENT: Duane G Raines  MRN: [id number]  DATE: 8/17/2022      Diagnosis:   1. Basal cell carcinoma of nose    2. Hyperlipidemia, unspecified hyperlipidemia type    3. 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 Normocytic anemia        Chief Complaint: Establish Care (3 months follow up with labs)        Subjective:     Interval History: Mr. Sanchez is a 67 y.o. male with HTN, HLD, DMII, h/o CVA who presents for follow up of basal cell carcinoma.   Since the last clinic visit the patient states he has been well.  The patient denies CP, cough, SOB, abdominal pain, N/V, constipation, diarrhea.  The patient denies fever, chills, night sweats, weight loss, new lumps or bumps, easy bruising or bleeding.    Prior History: The patient was initially diagnosed with basal cell carcinoma 9/2018 on the left side of the nose.  He was started On Odomzo 5/2021 with plan to help shrink the tumor for potential future surgical resection.  The patient saw Dr. Perez on 10/19/21.  After further discussion with Dr. Perez decision was made to direct the patient to Dr. Norm Melton possible Mohs procedure.   The patient met with Dr Melton on 12/16/21 with plans for Moh's procedure and reconstruction with Dr Perez.   The patient underwent Moh's procedure under the care of Dr Melton on 3/15/22.  The patient then underwent reconstructive surgeries with Dr Perez on 3/16/22, 4/18/22 and 5/16/22 with septal mucosal flap, cartilage graft, right forehead flap graft with revisions.    Past Medical History:   Past Medical History:   Diagnosis Date    Cancer     skin cancer to nose    Diabetes mellitus, type 2     Hyperlipidemia     Hypertension     Stroke 2019       Past Surgical HIstory:   Past Surgical History:   Procedure Laterality Date    APPLICATION OF CARTILAGE GRAFT Bilateral 3/16/2022    Procedure: APPLICATION, CARTILAGE GRAFT;  Surgeon: Estelle Perez MD;  Location: [facility];  Service: ENT;  Laterality: Bilateral;     CLOSURE OF DEFECT OF MOHS PROCEDURE Left 3/16/2022    Procedure: CLOSURE, MOHS PROCEDURE DEFECT - Face - Septal Mucosal Flap;  Surgeon: Estelle Perez MD;  Location: [facility];  Service: ENT;  Laterality: Left;    FLAP GRAFT SURGERY Right 3/16/2022    Procedure: FLAP GRAFT - Forehead;  Surgeon: Estelle Perez MD;  Location: [facility];  Service: ENT;  Laterality: Right;    FLAP GRAFT SURGERY N/A 4/18/2022    Procedure: FLAP GRAFT - COMPOSITE ROOT OF HELIX GRAFT;  Surgeon: Estelle Perez MD;  Location: [facility];  Service: ENT;  Laterality: N/A;    lipoma removal      to face    NV TRANSCRAN DOPP INTRACRAN, EMBOLI W/O INJ  1/6/2018         REVISION OF FLAP GRAFT Left 4/18/2022    Procedure: REVISION, PROCEDURE - FOREHEAD FLAP;  Surgeon: Estelle Perez MD;  Location: [facility];  Service: ENT;  Laterality: Left;    REVISION OF FLAP GRAFT Left 5/16/2022    Procedure: REVISION, PROCEDURE INVOLVING FLAP GRAFT - Forehead to  Nose;  Surgeon: Estelle Perez MD;  Location: [facility];  Service: ENT;  Laterality: Left;       Family History:   Family History   Problem Relation Age of Onset    Diabetes Mother     Diabetes Maternal Grandmother     Glaucoma Neg Hx     Macular degeneration Neg Hx        Social History:  reports that he has quit smoking. He has never used smokeless tobacco. He reports that he does not drink alcohol and does not use drugs.    Allergies:  Review of patient's allergies indicates:  No Known Allergies    Medications:  Current Outpatient Medications   Medication Sig Dispense Refill    atorvastatin (LIPITOR) 40 MG tablet Take 40 mg by mouth once daily.      erythromycin (ROMYCIN) ophthalmic ointment 1/4 inch ribbon in lower lids of both eyes at bedtime (Patient not taking: Reported on 8/17/2022) 1 g 0    glipiZIDE (GLUCOTROL) 5 MG tablet Take 2 tablets (10 mg total) by mouth 2 (two) times daily with meals. 120 tablet 11    lancets (ACCU-CHEK MULTICLIX LANCET) Misc 1 lancet by Misc.(Non-Drug; Combo Route) route 2  "(two) times daily. (Patient not taking: Reported on 8/17/2022) 100 each 0    metFORMIN (GLUCOPHAGE) 500 MG tablet Take 500 mg by mouth 2 (two) times daily.       No current facility-administered medications for this visit.     Facility-Administered Medications Ordered in Other Visits   Medication Dose Route Frequency Provider Last Rate Last Admin    lactated ringers infusion   Intravenous Continuous Jacqueline Volpi-Abadie, MD   New Bag at 05/16/22 1130    LIDOcaine (PF) 10 mg/ml (1%) injection 10 mg  1 mL Other Once Jacqueline Volpi-Abadie, MD           Review of Systems   Constitutional: Negative for chills, diaphoresis, fatigue, fever and unexpected weight change.   Respiratory: Negative for cough and shortness of breath.    Cardiovascular: Negative for chest pain and palpitations.   Gastrointestinal: Negative for abdominal pain, constipation, diarrhea, nausea and vomiting.   Musculoskeletal: Negative for myalgias.   Skin: Negative for color change and rash.   Neurological: Negative for headaches.   Hematological: Negative for adenopathy. Does not bruise/bleed easily.       ECOG Performance Status: 0   Objective:      Vitals:   Vitals:    08/17/22 1345   BP: (!) 169/76   BP Location: Left arm   Patient Position: Sitting   BP Method: Medium (Automatic)   Pulse: 60   Resp: 18   Temp: 98 °F (36.7 °C)   TempSrc: Temporal   SpO2: 97%   Weight: 89 kg (196 lb 3.4 oz)   Height: 5' 11" (1.803 m)       Physical Exam  Constitutional:       General: He is not in acute distress.     Appearance: He is well-developed. He is not diaphoretic.   HENT:      Head: Normocephalic and atraumatic.      Nose:      Comments: Pt with graft on left lateral nostril  Cardiovascular:      Rate and Rhythm: Normal rate and regular rhythm.      Heart sounds: Normal heart sounds. No murmur heard.    No friction rub. No gallop.   Pulmonary:      Effort: Pulmonary effort is normal. No respiratory distress.      Breath sounds: Normal breath " sounds. No wheezing or rales.   Chest:      Chest wall: No tenderness.   Breasts:      Right: No axillary adenopathy or supraclavicular adenopathy.      Left: No axillary adenopathy or supraclavicular adenopathy.       Abdominal:      General: Bowel sounds are normal. There is no distension.      Palpations: Abdomen is soft. There is no mass.      Tenderness: There is no abdominal tenderness. There is no rebound.   Musculoskeletal:      Cervical back: Normal range of motion.   Lymphadenopathy:      Cervical: No cervical adenopathy.      Upper Body:      Right upper body: No supraclavicular or axillary adenopathy.      Left upper body: No supraclavicular or axillary adenopathy.   Skin:     General: Skin is warm and dry.      Findings: No erythema or rash.   Neurological:      Mental Status: He is alert and oriented to person, place, and time.   Psychiatric:         Behavior: Behavior normal.       Laboratory Data:  Lab Visit on 08/17/2022   Component Date Value Ref Range Status    WBC 08/17/2022 6.85  3.90 - 12.70 K/uL Final    RBC 08/17/2022 4.08 (A) 4.60 - 6.20 M/uL Final    Hemoglobin 08/17/2022 13.5 (A) 14.0 - 18.0 g/dL Final    Hematocrit 08/17/2022 38.3 (A) 40.0 - 54.0 % Final    MCV 08/17/2022 94  82 - 98 fL Final    MCH 08/17/2022 33.1 (A) 27.0 - 31.0 pg Final    MCHC 08/17/2022 35.2  32.0 - 36.0 g/dL Final    RDW 08/17/2022 12.0  11.5 - 14.5 % Final    Platelets 08/17/2022 187  150 - 450 K/uL Final    MPV 08/17/2022 9.8  9.2 - 12.9 fL Final    Immature Granulocytes 08/17/2022 0.3  0.0 - 0.5 % Final    Gran # (ANC) 08/17/2022 3.9  1.8 - 7.7 K/uL Final    Immature Grans (Abs) 08/17/2022 0.02  0.00 - 0.04 K/uL Final    Comment: Mild elevation in immature granulocytes is non specific and   can be seen in a variety of conditions including stress response,   acute inflammation, trauma and pregnancy. Correlation with other   laboratory and clinical findings is essential.      Lymph # 08/17/2022 2.5   1.0 - 4.8 K/uL Final    Mono # 08/17/2022 0.4  0.3 - 1.0 K/uL Final    Eos # 08/17/2022 0.1  0.0 - 0.5 K/uL Final    Baso # 08/17/2022 0.03  0.00 - 0.20 K/uL Final    nRBC 08/17/2022 0  0 /100 WBC Final    Gran % 08/17/2022 56.2  38.0 - 73.0 % Final    Lymph % 08/17/2022 36.1  18.0 - 48.0 % Final    Mono % 08/17/2022 5.5  4.0 - 15.0 % Final    Eosinophil % 08/17/2022 1.5  0.0 - 8.0 % Final    Basophil % 08/17/2022 0.4  0.0 - 1.9 % Final    Differential Method 08/17/2022 Automated   Final    Sodium 08/17/2022 141  136 - 145 mmol/L Final    Potassium 08/17/2022 4.0  3.5 - 5.1 mmol/L Final    Chloride 08/17/2022 106  95 - 110 mmol/L Final    CO2 08/17/2022 23  23 - 29 mmol/L Final    Glucose 08/17/2022 290 (A) 70 - 110 mg/dL Final    BUN 08/17/2022 13  8 - 23 mg/dL Final    Creatinine 08/17/2022 1.3  0.5 - 1.4 mg/dL Final    Calcium 08/17/2022 10.0  8.7 - 10.5 mg/dL Final    Total Protein 08/17/2022 7.0  6.0 - 8.4 g/dL Final    Albumin 08/17/2022 3.9  3.5 - 5.2 g/dL Final    Total Bilirubin 08/17/2022 0.3  0.1 - 1.0 mg/dL Final    Comment: For infants and newborns, interpretation of results should be based  on gestational age, weight and in agreement with clinical  observations.    Premature Infant recommended reference ranges:  Up to 24 hours.............<8.0 mg/dL  Up to 48 hours............<12.0 mg/dL  3-5 days..................<15.0 mg/dL  6-29 days.................<15.0 mg/dL      Alkaline Phosphatase 08/17/2022 77  55 - 135 U/L Final    AST 08/17/2022 18  10 - 40 U/L Final    ALT 08/17/2022 27  10 - 44 U/L Final    Anion Gap 08/17/2022 12  8 - 16 mmol/L Final    eGFR 08/17/2022 >60.0  >60 mL/min/1.73 m^2 Final         Imaging: Reviewed    Assessment:       1. Basal cell carcinoma of nose    2. Hyperlipidemia, unspecified hyperlipidemia type    3. 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 Normocytic anemia           Plan:     Basal Cell Carcinoma - pt with basal cell  carcinoma of the left nose  -Pt underwent Moh's procedure on 3/15/22 under the care of Dr Melton  -Pt then underwent reconstruction with Dr Perez  -Pt will need dermatology appt every 6 months for surveillance  -Will schedule the patient to see dermatology    HLD - pt on atorvastatin  -Management per PCP    DMII - pt on Glipizide, metformin  -Last A1C 6.8 on 4/14/22  -Management per PCP    Normocytic Anemia - pt's hemoglobin was 13.5g/dL on 8/17/22  -Pt with intermittent anemia since 1/05/18  -Pt declined further work up currently  -Will reassess in 6 months with consideration for additional work up if pt still anemic at that time    Route Chart for Scheduling    Med Onc Chart Routing      Follow up with physician 6 months. The patient needs an apt with dermatology for monitoring of his skin.  He needs a CBC, CMP, Uric acid, and LDH in 3 months with a return appt at that time,   Follow up with [facility]    Infusion scheduling note    Injection scheduling note    Labs    Imaging    Pharmacy appointment    Other referrals               Kike Nguyen MD  [facility]  Hematology and Oncology  [facility]   [address] [place] [address]   O: [phone]  F: [phone]

## 2022-08-17 NOTE — TELEPHONE ENCOUNTER
Called patient to schedule Dermatology appointment.  Left voicemail message to call [phone].

## 2022-08-18 ENCOUNTER — TELEPHONE (OUTPATIENT)
Dept: DERMATOLOGY | Facility: CLINIC | Age: 67
End: 2022-08-18
Payer: MEDICARE

## 2022-08-19 ENCOUNTER — TELEPHONE (OUTPATIENT)
Dept: DERMATOLOGY | Facility: CLINIC | Age: 67
End: 2022-08-19
Payer: MEDICARE

## 2022-08-19 NOTE — TELEPHONE ENCOUNTER
Called patient to schedule Dermatology appointment.  Left voicemail message to call [phone].  Unable to contact.

## 2023-02-15 ENCOUNTER — LAB VISIT (OUTPATIENT)
Dept: LAB | Facility: HOSPITAL | Age: 68
End: 2023-02-15
Attending: INTERNAL MEDICINE
Payer: MEDICARE

## 2023-02-15 DIAGNOSIS — D64.9 NORMOCYTIC ANEMIA: ICD-10-CM

## 2023-02-15 LAB
ALBUMIN SERPL BCP-MCNC: 4.1 G/DL (ref 3.5–5.2)
ALP SERPL-CCNC: 110 U/L (ref 55–135)
ALT SERPL W/O P-5'-P-CCNC: 39 U/L (ref 10–44)
ANION GAP SERPL CALC-SCNC: 11 MMOL/L (ref 8–16)
AST SERPL-CCNC: 24 U/L (ref 10–40)
BASOPHILS # BLD AUTO: 0.03 K/UL (ref 0–0.2)
BASOPHILS NFR BLD: 0.3 % (ref 0–1.9)
BILIRUB SERPL-MCNC: 0.4 MG/DL (ref 0.1–1)
BUN SERPL-MCNC: 25 MG/DL (ref 8–23)
CALCIUM SERPL-MCNC: 10.5 MG/DL (ref 8.7–10.5)
CHLORIDE SERPL-SCNC: 102 MMOL/L (ref 95–110)
CO2 SERPL-SCNC: 27 MMOL/L (ref 23–29)
CREAT SERPL-MCNC: 1.4 MG/DL (ref 0.5–1.4)
DIFFERENTIAL METHOD: ABNORMAL
EOSINOPHIL # BLD AUTO: 0.1 K/UL (ref 0–0.5)
EOSINOPHIL NFR BLD: 0.7 % (ref 0–8)
ERYTHROCYTE [DISTWIDTH] IN BLOOD BY AUTOMATED COUNT: 12.8 % (ref 11.5–14.5)
EST. GFR  (NO RACE VARIABLE): 55.1 ML/MIN/1.73 M^2
GLUCOSE SERPL-MCNC: 305 MG/DL (ref 70–110)
HCT VFR BLD AUTO: 42.5 % (ref 40–54)
HGB BLD-MCNC: 14.6 G/DL (ref 14–18)
IMM GRANULOCYTES # BLD AUTO: 0.04 K/UL (ref 0–0.04)
IMM GRANULOCYTES NFR BLD AUTO: 0.4 % (ref 0–0.5)
LDH SERPL L TO P-CCNC: 178 U/L (ref 110–260)
LYMPHOCYTES # BLD AUTO: 3.1 K/UL (ref 1–4.8)
LYMPHOCYTES NFR BLD: 31.3 % (ref 18–48)
MCH RBC QN AUTO: 32.7 PG (ref 27–31)
MCHC RBC AUTO-ENTMCNC: 34.4 G/DL (ref 32–36)
MCV RBC AUTO: 95 FL (ref 82–98)
MONOCYTES # BLD AUTO: 0.6 K/UL (ref 0.3–1)
MONOCYTES NFR BLD: 5.8 % (ref 4–15)
NEUTROPHILS # BLD AUTO: 6 K/UL (ref 1.8–7.7)
NEUTROPHILS NFR BLD: 61.5 % (ref 38–73)
NRBC BLD-RTO: 0 /100 WBC
PLATELET # BLD AUTO: 233 K/UL (ref 150–450)
PMV BLD AUTO: 9.4 FL (ref 9.2–12.9)
POTASSIUM SERPL-SCNC: 4.5 MMOL/L (ref 3.5–5.1)
PROT SERPL-MCNC: 8.2 G/DL (ref 6–8.4)
RBC # BLD AUTO: 4.46 M/UL (ref 4.6–6.2)
SODIUM SERPL-SCNC: 140 MMOL/L (ref 136–145)
URATE SERPL-MCNC: 6.2 MG/DL (ref 3.4–7)
WBC # BLD AUTO: 9.81 K/UL (ref 3.9–12.7)

## 2023-02-15 PROCEDURE — 80053 COMPREHEN METABOLIC PANEL: CPT | Mod: PN | Performed by: INTERNAL MEDICINE

## 2023-02-15 PROCEDURE — 85025 COMPLETE CBC W/AUTO DIFF WBC: CPT | Mod: PN | Performed by: INTERNAL MEDICINE

## 2023-02-15 PROCEDURE — 84550 ASSAY OF BLOOD/URIC ACID: CPT | Mod: PN | Performed by: INTERNAL MEDICINE

## 2023-02-15 PROCEDURE — 83615 LACTATE (LD) (LDH) ENZYME: CPT | Mod: PN | Performed by: INTERNAL MEDICINE

## 2023-02-15 PROCEDURE — 36415 COLL VENOUS BLD VENIPUNCTURE: CPT | Mod: PN | Performed by: INTERNAL MEDICINE

## 2023-02-16 NOTE — PROGRESS NOTES
PATIENT: Duane G Raines  MRN: [id number]  DATE: 2/17/2023      Diagnosis:   1. Basal cell carcinoma of nose    2. Hyperlipidemia, unspecified hyperlipidemia type    3. 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 Normocytic anemia          Chief Complaint: Basal Cell Carcinoma (6 month follow up; basal cell carcinoma of nose )        Subjective:     Interval History: Mr. Sanchez is a 67 y.o. male with HTN, HLD, DMII, h/o CVA who presents for follow up of basal cell carcinoma.   Since the last clinic visit the patient states he has been well.  The patient denies CP, cough, SOB, abdominal pain, N/V, constipation, diarrhea.  The patient denies fever, chills, night sweats, weight loss, new lumps or bumps, easy bruising or bleeding.    Prior History: The patient was initially diagnosed with basal cell carcinoma 9/2018 on the left side of the nose.  He was started On Odomzo 5/2021 with plan to help shrink the tumor for potential future surgical resection.  The patient saw Dr. Perez on 10/19/21.  After further discussion with Dr. Perez decision was made to direct the patient to Dr. Norm Melton possible Mohs procedure.   The patient met with Dr Melton on 12/16/21 with plans for Moh's procedure and reconstruction with Dr Perez.   The patient underwent Moh's procedure under the care of Dr Melton on 3/15/22.  The patient then underwent reconstructive surgeries with Dr Perez on 3/16/22, 4/18/22 and 5/16/22 with septal mucosal flap, cartilage graft, right forehead flap graft with revisions.    Past Medical History:   Past Medical History:   Diagnosis Date    Cancer     skin cancer to nose    Diabetes mellitus, type 2     Hyperlipidemia     Hypertension     Stroke 2019       Past Surgical HIstory:   Past Surgical History:   Procedure Laterality Date    APPLICATION OF CARTILAGE GRAFT Bilateral 3/16/2022    Procedure: APPLICATION, CARTILAGE GRAFT;  Surgeon: Estelle Perez MD;  Location: [facility];  Service: ENT;   Laterality: Bilateral;    CLOSURE OF DEFECT OF MOHS PROCEDURE Left 3/16/2022    Procedure: CLOSURE, MOHS PROCEDURE DEFECT - Face - Septal Mucosal Flap;  Surgeon: Estelle Perez MD;  Location: [facility];  Service: ENT;  Laterality: Left;    FLAP GRAFT SURGERY Right 3/16/2022    Procedure: FLAP GRAFT - Forehead;  Surgeon: Estelle Perez MD;  Location: [facility];  Service: ENT;  Laterality: Right;    FLAP GRAFT SURGERY N/A 4/18/2022    Procedure: FLAP GRAFT - COMPOSITE ROOT OF HELIX GRAFT;  Surgeon: Estelle Perez MD;  Location: [facility];  Service: ENT;  Laterality: N/A;    lipoma removal      to face    IN TRANSCRAN DOPP INTRACRAN, EMBOLI W/O INJ  1/6/2018         REVISION OF FLAP GRAFT Left 4/18/2022    Procedure: REVISION, PROCEDURE - FOREHEAD FLAP;  Surgeon: Estelle Perez MD;  Location: [facility];  Service: ENT;  Laterality: Left;    REVISION OF FLAP GRAFT Left 5/16/2022    Procedure: REVISION, PROCEDURE INVOLVING FLAP GRAFT - Forehead to  Nose;  Surgeon: Estelle Perez MD;  Location: [facility];  Service: ENT;  Laterality: Left;       Family History:   Family History   Problem Relation Age of Onset    Diabetes Mother     Diabetes Maternal Grandmother     Glaucoma Neg Hx     Macular degeneration Neg Hx        Social History:  reports that he has quit smoking. He has never used smokeless tobacco. He reports that he does not drink alcohol and does not use drugs.    Allergies:  Review of patient's allergies indicates:  No Known Allergies    Medications:  Current Outpatient Medications   Medication Sig Dispense Refill    atorvastatin (LIPITOR) 40 MG tablet Take 1 tablet (40 mg total) by mouth once daily. 90 tablet 3    glipiZIDE (GLUCOTROL) 5 MG tablet Take 2 tablets (10 mg total) by mouth 2 (two) times daily with meals. 120 tablet 3    lancets (ACCU-CHEK MULTICLIX LANCET) Misc 1 lancet by Misc.(Non-Drug; Combo Route) route 2 (two) times daily. (Patient taking differently: 1 lancet by Misc.(Non-Drug; Combo Route) route 3 (three) times a  "week.) 100 each 0     No current facility-administered medications for this visit.       Review of Systems   Constitutional:  Negative for chills, diaphoresis, fatigue, fever and unexpected weight change.   Respiratory:  Negative for cough and shortness of breath.    Cardiovascular:  Negative for chest pain and palpitations.   Gastrointestinal:  Negative for abdominal pain, constipation, diarrhea, nausea and vomiting.   Musculoskeletal:  Negative for myalgias.   Skin:  Negative for color change and rash.   Neurological:  Negative for headaches.   Hematological:  Negative for adenopathy. Does not bruise/bleed easily.     ECOG Performance Status: 0   Objective:      Vitals:   Vitals:    02/17/23 1017   BP: 136/70   BP Location: Right arm   Patient Position: Sitting   BP Method: Medium (Manual)   Pulse: 85   Temp: 97.6 °F (36.4 °C)   TempSrc: Temporal   SpO2: 98%   Weight: 88.3 kg (194 lb 12.4 oz)   Height: 5' 11" (1.803 m)         Physical Exam  Constitutional:       General: He is not in acute distress.     Appearance: He is well-developed. He is not diaphoretic.   HENT:      Head: Normocephalic and atraumatic.      Nose:      Comments: Pt with graft on left lateral nostril  Cardiovascular:      Rate and Rhythm: Normal rate and regular rhythm.      Heart sounds: Normal heart sounds. No murmur heard.    No friction rub. No gallop.   Pulmonary:      Effort: Pulmonary effort is normal. No respiratory distress.      Breath sounds: Normal breath sounds. No wheezing or rales.   Chest:      Chest wall: No tenderness.   Abdominal:      General: Bowel sounds are normal. There is no distension.      Palpations: Abdomen is soft. There is no mass.      Tenderness: There is no abdominal tenderness. There is no rebound.   Musculoskeletal:      Cervical back: Normal range of motion.   Lymphadenopathy:      Cervical: No cervical adenopathy.      Upper Body:      Right upper body: No supraclavicular or axillary adenopathy.      Left " upper body: No supraclavicular or axillary adenopathy.   Skin:     General: Skin is warm and dry.      Findings: No erythema or rash.   Neurological:      Mental Status: He is alert and oriented to person, place, and time.   Psychiatric:         Behavior: Behavior normal.     Laboratory Data:  Lab Visit on 02/15/2023   Component Date Value Ref Range Status    WBC 02/15/2023 9.81  3.90 - 12.70 K/uL Final    RBC 02/15/2023 4.46 (L)  4.60 - 6.20 M/uL Final    Hemoglobin 02/15/2023 14.6  14.0 - 18.0 g/dL Final    Hematocrit 02/15/2023 42.5  40.0 - 54.0 % Final    MCV 02/15/2023 95  82 - 98 fL Final    MCH 02/15/2023 32.7 (H)  27.0 - 31.0 pg Final    MCHC 02/15/2023 34.4  32.0 - 36.0 g/dL Final    RDW 02/15/2023 12.8  11.5 - 14.5 % Final    Platelets 02/15/2023 233  150 - 450 K/uL Final    MPV 02/15/2023 9.4  9.2 - 12.9 fL Final    Immature Granulocytes 02/15/2023 0.4  0.0 - 0.5 % Final    Gran # (ANC) 02/15/2023 6.0  1.8 - 7.7 K/uL Final    Immature Grans (Abs) 02/15/2023 0.04  0.00 - 0.04 K/uL Final    Comment: Mild elevation in immature granulocytes is non specific and   can be seen in a variety of conditions including stress response,   acute inflammation, trauma and pregnancy. Correlation with other   laboratory and clinical findings is essential.      Lymph # 02/15/2023 3.1  1.0 - 4.8 K/uL Final    Mono # 02/15/2023 0.6  0.3 - 1.0 K/uL Final    Eos # 02/15/2023 0.1  0.0 - 0.5 K/uL Final    Baso # 02/15/2023 0.03  0.00 - 0.20 K/uL Final    nRBC 02/15/2023 0  0 /100 WBC Final    Gran % 02/15/2023 61.5  38.0 - 73.0 % Final    Lymph % 02/15/2023 31.3  18.0 - 48.0 % Final    Mono % 02/15/2023 5.8  4.0 - 15.0 % Final    Eosinophil % 02/15/2023 0.7  0.0 - 8.0 % Final    Basophil % 02/15/2023 0.3  0.0 - 1.9 % Final    Differential Method 02/15/2023 Automated   Final    Sodium 02/15/2023 140  136 - 145 mmol/L Final    Potassium 02/15/2023 4.5  3.5 - 5.1 mmol/L Final    Chloride 02/15/2023 102  95 - 110 mmol/L Final    CO2  02/15/2023 27  23 - 29 mmol/L Final    Glucose 02/15/2023 305 (H)  70 - 110 mg/dL Final    BUN 02/15/2023 25 (H)  8 - 23 mg/dL Final    Creatinine 02/15/2023 1.4  0.5 - 1.4 mg/dL Final    Calcium 02/15/2023 10.5  8.7 - 10.5 mg/dL Final    Total Protein 02/15/2023 8.2  6.0 - 8.4 g/dL Final    Albumin 02/15/2023 4.1  3.5 - 5.2 g/dL Final    Total Bilirubin 02/15/2023 0.4  0.1 - 1.0 mg/dL Final    Comment: For infants and newborns, interpretation of results should be based  on gestational age, weight and in agreement with clinical  observations.    Premature Infant recommended reference ranges:  Up to 24 hours.............<8.0 mg/dL  Up to 48 hours............<12.0 mg/dL  3-5 days..................<15.0 mg/dL  6-29 days.................<15.0 mg/dL      Alkaline Phosphatase 02/15/2023 110  55 - 135 U/L Final    AST 02/15/2023 24  10 - 40 U/L Final    ALT 02/15/2023 39  10 - 44 U/L Final    Anion Gap 02/15/2023 11  8 - 16 mmol/L Final    eGFR 02/15/2023 55.1 (A)  >60 mL/min/1.73 m^2 Final    LD 02/15/2023 178  110 - 260 U/L Final    Results are increased in hemolyzed samples.    Uric Acid 02/15/2023 6.2  3.4 - 7.0 mg/dL Final         Imaging: Reviewed    Assessment:       1. Basal cell carcinoma of nose    2. Hyperlipidemia, unspecified hyperlipidemia type    3. 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 Normocytic anemia             Plan:     Basal Cell Carcinoma - pt with basal cell carcinoma of the left nose  -Pt underwent Moh's procedure on 3/15/22 under the care of Dr Melton  -Pt then underwent reconstruction with Dr Perez  -No need to follow up in our clinic  -Pt to see dermatology for akin surveillance     HLD - pt on atorvastatin  -Management per PCP    DMII - pt on Glipizide  Lab Results   Component Value Date    HGBA1C 8.0 (H) 02/08/2023   -Management per PCP    Normocytic Anemia - pt's hemoglobin was 14.6g/dL on 2/15/23  -Pt with intermittent anemia since 1/05/18  -improved    Route Chart  for Scheduling    Med Onc Chart Routing      Follow up with physician No follow up needed. The patietn must get in to see a dermatologist for monitoring for basal cell carcinoma.  Pt does not need a follow up with me.   Follow up with [facility]    Infusion scheduling note    Injection scheduling note    Labs    Imaging    Pharmacy appointment    Other referrals             Kike Nguyen MD  [facility]  Hematology and Oncology  [facility]   [address]   [place] [address]   O: [phone]  F: [phone]

## 2023-02-17 ENCOUNTER — TELEPHONE (OUTPATIENT)
Dept: HEMATOLOGY/ONCOLOGY | Facility: CLINIC | Age: 68
End: 2023-02-17
Payer: MEDICARE

## 2023-02-17 ENCOUNTER — OFFICE VISIT (OUTPATIENT)
Dept: HEMATOLOGY/ONCOLOGY | Facility: CLINIC | Age: 68
End: 2023-02-17
Payer: MEDICARE

## 2023-02-17 VITALS
SYSTOLIC BLOOD PRESSURE: 136 MMHG | DIASTOLIC BLOOD PRESSURE: 70 MMHG | TEMPERATURE: 98 F | HEIGHT: 71 IN | OXYGEN SATURATION: 98 % | WEIGHT: 194.75 LBS | HEART RATE: 85 BPM | BODY MASS INDEX: 27.27 KG/M2

## 2023-02-17 DIAGNOSIS — E78.5 HYPERLIPIDEMIA, UNSPECIFIED HYPERLIPIDEMIA TYPE: ICD-10-CM

## 2023-02-17 DIAGNOSIS — C44.311 BASAL CELL CARCINOMA OF NOSE: Primary | ICD-10-CM

## 2023-02-17 DIAGNOSIS — D64.9 NORMOCYTIC ANEMIA: ICD-10-CM

## 2023-02-17 DIAGNOSIS — E11.49 TYPE 2 DIABETES MELLITUS WITH NEUROLOGICAL COMPLICATIONS: ICD-10-CM

## 2023-02-17 PROCEDURE — 1159F PR MEDICATION LIST DOCUMENTED IN MEDICAL RECORD: ICD-10-PCS | Mod: CPTII,S$GLB,, | Performed by: INTERNAL MEDICINE

## 2023-02-17 PROCEDURE — 99999 PR PBB SHADOW E&M-EST. PATIENT-LVL III: ICD-10-PCS | Mod: PBBFAC,,, | Performed by: INTERNAL MEDICINE

## 2023-02-17 PROCEDURE — 3288F PR FALLS RISK ASSESSMENT DOCUMENTED: ICD-10-PCS | Mod: CPTII,S$GLB,, | Performed by: INTERNAL MEDICINE

## 2023-02-17 PROCEDURE — 3078F PR MOST RECENT DIASTOLIC BLOOD PRESSURE < 80 MM HG: ICD-10-PCS | Mod: CPTII,S$GLB,, | Performed by: INTERNAL MEDICINE

## 2023-02-17 PROCEDURE — 3008F PR BODY MASS INDEX (BMI) DOCUMENTED: ICD-10-PCS | Mod: CPTII,S$GLB,, | Performed by: INTERNAL MEDICINE

## 2023-02-17 PROCEDURE — 3075F SYST BP GE 130 - 139MM HG: CPT | Mod: CPTII,S$GLB,, | Performed by: INTERNAL MEDICINE

## 2023-02-17 PROCEDURE — 3052F PR MOST RECENT HEMOGLOBIN A1C LEVEL 8.0 - < 9.0%: ICD-10-PCS | Mod: CPTII,S$GLB,, | Performed by: INTERNAL MEDICINE

## 2023-02-17 PROCEDURE — 99212 PR OFFICE/OUTPT VISIT, EST, LEVL II, 10-19 MIN: ICD-10-PCS | Mod: S$GLB,,, | Performed by: INTERNAL MEDICINE

## 2023-02-17 PROCEDURE — 3052F HG A1C>EQUAL 8.0%<EQUAL 9.0%: CPT | Mod: CPTII,S$GLB,, | Performed by: INTERNAL MEDICINE

## 2023-02-17 PROCEDURE — 3288F FALL RISK ASSESSMENT DOCD: CPT | Mod: CPTII,S$GLB,, | Performed by: INTERNAL MEDICINE

## 2023-02-17 PROCEDURE — 1159F MED LIST DOCD IN RCRD: CPT | Mod: CPTII,S$GLB,, | Performed by: INTERNAL MEDICINE

## 2023-02-17 PROCEDURE — 1101F PT FALLS ASSESS-DOCD LE1/YR: CPT | Mod: CPTII,S$GLB,, | Performed by: INTERNAL MEDICINE

## 2023-02-17 PROCEDURE — 99999 PR PBB SHADOW E&M-EST. PATIENT-LVL III: CPT | Mod: PBBFAC,,, | Performed by: INTERNAL MEDICINE

## 2023-02-17 PROCEDURE — 1125F AMNT PAIN NOTED PAIN PRSNT: CPT | Mod: CPTII,S$GLB,, | Performed by: INTERNAL MEDICINE

## 2023-02-17 PROCEDURE — 1101F PR PT FALLS ASSESS DOC 0-1 FALLS W/OUT INJ PAST YR: ICD-10-PCS | Mod: CPTII,S$GLB,, | Performed by: INTERNAL MEDICINE

## 2023-02-17 PROCEDURE — 3078F DIAST BP <80 MM HG: CPT | Mod: CPTII,S$GLB,, | Performed by: INTERNAL MEDICINE

## 2023-02-17 PROCEDURE — 3075F PR MOST RECENT SYSTOLIC BLOOD PRESS GE 130-139MM HG: ICD-10-PCS | Mod: CPTII,S$GLB,, | Performed by: INTERNAL MEDICINE

## 2023-02-17 PROCEDURE — 99212 OFFICE O/P EST SF 10 MIN: CPT | Mod: S$GLB,,, | Performed by: INTERNAL MEDICINE

## 2023-02-17 PROCEDURE — 1125F PR PAIN SEVERITY QUANTIFIED, PAIN PRESENT: ICD-10-PCS | Mod: CPTII,S$GLB,, | Performed by: INTERNAL MEDICINE

## 2023-02-17 PROCEDURE — 3008F BODY MASS INDEX DOCD: CPT | Mod: CPTII,S$GLB,, | Performed by: INTERNAL MEDICINE

## 2023-04-21 ENCOUNTER — TELEPHONE (OUTPATIENT)
Dept: DERMATOLOGY | Facility: CLINIC | Age: 68
End: 2023-04-21
Payer: MEDICARE

## 2023-10-07 NOTE — TELEPHONE ENCOUNTER
Called Dr. Ely office at [phone] to determine status of sonidegib. Spoke to Veronica and she reported she would transfer me to JESSIE Dorsey. Transferred to JESSIE Dorsey and she confirmed that Mr. Sanchez is no longer on sonidegib. Referral closed. Sonidegib prescription discontinued.   
Messaged Dr. Perez to confirm that pt has completed his therapy for sonidegib.  He confirmed that he has stopped the medication and has about 10 pills on hand. We discussed proper disposal and I did inform him that I would reach out to his provider and confirm that therapy should be discontinued before closing out in our system.   
Outgoing call regarding Odomzo refill. Pt declined refill. Pt stated that he had surgery to remove the cancer on his nose on 3/22/22 and he does not need medication at this time. Routing to Assigned [facility] Jemima.   
Outgoing call regarding refill of Odomzo. Pt stated he no longer needed to refill medication due to recently having surgery of tumor removal. Transferred call to tima Saldana to close pt out. Also routing to Tonja Saldana  
Low Risk (score 7-11)

## 2024-01-09 NOTE — CONSULTS
[facility]  Endocrinology  Consult Note    Consult Requested by: Terrnace Peace MD   Reason for admit: Embolic stroke involving left middle cerebral artery    HISTORY OF PRESENT ILLNESS:  Reason for Consult: Management of diabetes, Hyperglycemia     Diabetes diagnosis year: This admission    Home Diabetes Medications:  none, new onset    Diabetes Complications include:     Neuropathy    Complicating diabetes co morbidities:   CVA this admission  Tobacco abuse    HPI:   Patient is a 62 y.o. male with no significant past medical history (has not seen a doctor in many years), presents with acute onset of aphasia on 1/4 and found to have an MCA stroke. He received tPA and currently being treated for his stroke. He was found to be diabetic with an A1c of 8.7. He reports being told when he was 10 years old that he was diabetic, but that it went away and he's never had any trouble since. His mother was diabetic, and would intermittently check all her family members' blood glucose. He does not think it has ever been high before. He reports symptoms of polyuria, polydipsia, and tingling pain in his bilateral feet. Symptoms have been present for about 3 months, and are steady in progression. We have been consulted to assist with diabetes management. Of note, he has no health insurance and is on a fixed income with social security.      Medications and/or Treatments Impacting Glycemic Control:  Immunotherapy:    Immunosuppressants     None        Steroids:   Hormones     None        Pressors:    Autonomic Drugs     None          No prescriptions prior to admission.       Current Facility-Administered Medications   Medication Dose Route Frequency Provider Last Rate Last Dose    acetaminophen tablet 650 mg  650 mg Oral Q6H PRN Sunita Chiang PA-C        aspirin tablet 325 mg  325 mg Oral Daily Rosa Perez PA-C   325 mg at 01/06/18 0942    atorvastatin tablet 80 mg  80 mg Oral Daily Viola Eddy,  Please inform her ok to increase to 1 tab per day.  Will take 6 mo to see full effect of this.    Prescription is pended, please send to the patient's preferred pharmacy.    She should also schedule a follow up with me for about 6 mo     DNP, NP   80 mg at 01/06/18 0941    [START ON 1/7/2018] clopidogrel tablet 75 mg  75 mg Oral Daily Viola WILFRIDOKannan Eddy DNP, NP        dextrose 50% injection 12.5 g  12.5 g Intravenous PRN Sunita Chiang PA-C        dextrose 50% injection 25 g  25 g Intravenous PRN Sunita Chiang PA-C        glucagon (human recombinant) injection 1 mg  1 mg Intramuscular PRN Sunita Chiang PA-C        glucose chewable tablet 16 g  16 g Oral PRN Sunita Chiang PA-C        glucose chewable tablet 24 g  24 g Oral PRN Sunita Chiang PA-C        heparin (porcine) injection 5,000 Units  5,000 Units Subcutaneous Q8H Rosa Perez PA-C   5,000 Units at 01/06/18 0634    insulin aspart pen 0-5 Units  0-5 Units Subcutaneous QID (AC + HS) PRN Trell Waggoner MD        insulin aspart pen 4 Units  4 Units Subcutaneous TIDWM Polly Mason MD   4 Units at 01/06/18 1226    insulin detemir pen 6 Units  6 Units Subcutaneous BID Polly Mason MD   6 Units at 01/06/18 0937    lisinopril tablet 10 mg  10 mg Oral Daily Polly Maosn MD   10 mg at 01/06/18 0941    ondansetron injection 8 mg  8 mg Intravenous Q8H PRN Sunita Chiang PA-C        senna-docusate 8.6-50 mg per tablet 1 tablet  1 tablet Oral BID Sunita Chiang PA-C   1 tablet at 01/06/18 0941    sodium chloride 0.9% flush 3 mL  3 mL Intravenous Q8H Sunita Chiang PA-C   3 mL at 01/06/18 0635         PMH, PSH, FH, SH updated and reviewed     Review of Systems   Constitutional: Negative for unexpected weight change.   Eyes: Positive for visual disturbance (chronic decreased vision, attributed to previous welding history).   Respiratory: Negative for shortness of breath.    Cardiovascular: Negative for chest pain.   Gastrointestinal: Negative for abdominal pain.   Endocrine: Positive for polydipsia and polyuria.   Genitourinary: Negative for urgency.   Musculoskeletal: Negative for arthralgias.   Skin: Negative for wound.  "  Neurological: Positive for speech difficulty. Negative for headaches.        Tingling and pain in feet   Hematological: Does not bruise/bleed easily.   Psychiatric/Behavioral: Negative for sleep disturbance.       PHYSICAL EXAMINATION:  Vitals:    01/06/18 1150   BP:    Pulse: 88   Resp:    Temp:      Body mass index is 26.6 kg/m².    Physical Exam   Constitutional: He appears well-developed and well-nourished. No distress.   HENT:   Right Ear: External ear normal.   Left Ear: External ear normal.   Nose: Nose normal.   Hearing grossly normal  Dentition grossly normal   Cardiovascular: Normal rate.    No murmur heard.  Pulmonary/Chest: Effort normal and breath sounds normal.   Abdominal: Soft. He exhibits no mass. There is no tenderness.   Musculoskeletal: He exhibits no edema.   No digital clubbing or extremity cyanosis   Neurological: No cranial nerve deficit. Coordination normal.   Noted expressive aphasia.   Skin: Skin is warm and dry. No rash noted.   No subcutaneous nodules noted.   Psychiatric: He has a normal mood and affect. His behavior is normal.   Alert and oriented to person, place, and situation.   Nursing note and vitals reviewed.    .     ASSESSMENT and PLAN:    * Embolic stroke involving left middle cerebral artery    Per primary team.  Avoid hypoglycemia.        Hypertriglyceridemia    Likely worsened by poorly controlled diabetes.  Would repeat once glucose better controlled.          Poorly controlled type 2 diabetes mellitus    Agree with starting MDI regimen. Based on glucose response yesterday, the current dose appears to be close to his actual requirements.    Continue levemir 6 BID  Continue novolog 4 units with meals.  Change POC to AC,hs,0200  Change to Low dose correction    Given history of "childhood diabetes" will check for JUSTINO with ANASTASIA Ab.    Discharge planning (assuming ANASTASIA negative):  Medication choices will be limited by finances and lack of insurance.  Metformin 1000 mg BID " (start at 500 mg daily and increase by one tablet weekly until up to 2 tablets BID)  Glipizide 5 mg daily  Will need diabetic teaching on blood glucose monitoring, meal planning, etc.  Will need glucometer, strips, lancet Rx.            DISCHARGE NEEDS: will assess daily    Trell Waggoner MD  Endocrinology  [facility], Hedy Chavez MD,  have personally taken the history and examined the patient and agree with the resident's note as stated above.